# Patient Record
Sex: FEMALE | Race: WHITE | Employment: UNEMPLOYED | URBAN - METROPOLITAN AREA
[De-identification: names, ages, dates, MRNs, and addresses within clinical notes are randomized per-mention and may not be internally consistent; named-entity substitution may affect disease eponyms.]

---

## 2020-10-30 ENCOUNTER — OFFICE VISIT (OUTPATIENT)
Dept: PULMONOLOGY | Age: 60
End: 2020-10-30
Payer: MEDICARE

## 2020-10-30 VITALS
OXYGEN SATURATION: 98 % | DIASTOLIC BLOOD PRESSURE: 77 MMHG | HEART RATE: 98 BPM | RESPIRATION RATE: 18 BRPM | BODY MASS INDEX: 28.35 KG/M2 | SYSTOLIC BLOOD PRESSURE: 137 MMHG | WEIGHT: 160 LBS | HEIGHT: 63 IN

## 2020-10-30 PROCEDURE — G0296 VISIT TO DETERM LDCT ELIG: HCPCS | Performed by: INTERNAL MEDICINE

## 2020-10-30 PROCEDURE — 94664 DEMO&/EVAL PT USE INHALER: CPT | Performed by: INTERNAL MEDICINE

## 2020-10-30 PROCEDURE — 99204 OFFICE O/P NEW MOD 45 MIN: CPT | Performed by: INTERNAL MEDICINE

## 2020-10-30 RX ORDER — ALBUTEROL SULFATE 90 UG/1
2 AEROSOL, METERED RESPIRATORY (INHALATION) EVERY 6 HOURS PRN
COMMUNITY
End: 2021-01-21

## 2020-10-30 RX ORDER — ALBUTEROL SULFATE 2.5 MG/3ML
2.5 SOLUTION RESPIRATORY (INHALATION) EVERY 6 HOURS PRN
Qty: 120 EACH | Refills: 3 | Status: SHIPPED | OUTPATIENT
Start: 2020-10-30 | End: 2021-04-15 | Stop reason: SDUPTHER

## 2020-10-30 RX ORDER — TIOTROPIUM BROMIDE AND OLODATEROL 3.124; 2.736 UG/1; UG/1
2 SPRAY, METERED RESPIRATORY (INHALATION) DAILY
Qty: 3 INHALER | Refills: 3 | Status: SHIPPED | OUTPATIENT
Start: 2020-10-30 | End: 2020-12-22

## 2020-10-30 RX ORDER — AMITRIPTYLINE HYDROCHLORIDE 25 MG/1
25 TABLET, FILM COATED ORAL NIGHTLY
COMMUNITY

## 2020-10-30 RX ORDER — NICOTINE 21 MG/24HR
1 PATCH, TRANSDERMAL 24 HOURS TRANSDERMAL DAILY
Qty: 42 PATCH | Refills: 3 | Status: SHIPPED | OUTPATIENT
Start: 2020-10-30 | End: 2021-07-26

## 2020-10-30 RX ORDER — ALBUTEROL SULFATE 2.5 MG/3ML
2.5 SOLUTION RESPIRATORY (INHALATION) EVERY 6 HOURS PRN
COMMUNITY
End: 2020-10-30 | Stop reason: SDUPTHER

## 2020-10-30 RX ORDER — ESCITALOPRAM OXALATE 10 MG/1
10 TABLET ORAL DAILY
COMMUNITY

## 2020-10-30 NOTE — PROGRESS NOTES
PULMONARY OFFICE NEW PATIENT VISIT    CONSULTING PHYSICIAN:      REASON FOR VISIT:   Chief Complaint   Patient presents with    Shortness of Breath     NPV - pt states that she was dx'd w/COPD 2 yrs ago by her PCP       DATE OF VISIT: 10/30/2020    HISTORY OF PRESENT ILLNESS: 61y.o. year old female with past medical history of anxiety who is here for evaluation of shortness of breath.    -Patient has been complaining of dyspnea on exertion for at least 2 years now. She states that dyspnea on exertion is progressively getting worse. She get short of breath and performing daily activities of her life. No dyspnea on exertion at rest.  She denies any chronic cough. This is associated with off-and-on wheezing. Denies any chest pain or hemoptysis. Denies any allergic rhinitis symptoms or acid reflux. For his symptoms, she has been taking Spiriva Respimat 2.5 mcg 2 puffs daily and albuterol inhaler and albuterol nebs as needed. Patient states that for at least 6 months, she was out of her medications. Currently she does not have albuterol nebs. -Patient has significant smoking history of at least 43 pack years. She is not smoking up to 4 to 5 cigarettes/day. DIAGNOSTIC TEST REVIEWED:  No PFTs to review  No chest x-ray to review    REVIEW OF SYSTEMS:   CONSTITUTIONAL SYMPTOMS: The patient denies fever, fatigue, night sweats, weight loss or weight gain. HEENT: No vision changes. No tinnitus, Denies sinus pain. No hoarseness, or dysphagia. NECK: Patient denies swelling in the neck. CARDIOVASCULAR: Denies chest pain, palpitation, syncope. RESPIRATORY: See above. GASTROINTESTINAL: Denies nausea, abdominal pain or change in bowel function. GENITOURINARY: Denies obstructive symptoms. No history of incontinence. BREASTS: No masses or lumps in the breasts. SKIN: No rashes or itching. MUSCULOSKELETAL: Denies weakness or bone pain. NEUROLOGICAL: No headaches or seizures.    PSYCHIATRIC: Denies mood swings or depression. ENDOCRINE: Denies heat or cold intolerance or excessive thirst.  HEMATOLOGIC/LYMPHATIC: Denies easy bruising or lymph node swelling. ALLERGIC/IMMUNOLOGIC: No environmental allergies. PAST MEDICAL HISTORY: No past medical history on file. PAST SURGICAL HISTORY:   Past Surgical History:   Procedure Laterality Date    TUBAL LIGATION          SOCIAL HISTORY:   Social History     Tobacco Use    Smoking status: Current Every Day Smoker     Packs/day: 0.75     Years: 43.00     Pack years: 32.25     Types: Cigarettes    Smokeless tobacco: Never Used    Tobacco comment: started to smoke at 16 / smoked up to 0.75 ppd / now smoking 4 to 5 cigarettes a day   Substance Use Topics    Alcohol use: No    Drug use: No       FAMILY HISTORY:   Family History   Problem Relation Age of Onset    Stroke Mother     Cancer Father     Heart Disease Sister     Heart Disease Brother        MEDICATIONS:     Current Outpatient Medications on File Prior to Visit   Medication Sig Dispense Refill    albuterol sulfate HFA (VENTOLIN HFA) 108 (90 Base) MCG/ACT inhaler Inhale 2 puffs into the lungs every 6 hours as needed for Wheezing      tiotropium (SPIRIVA RESPIMAT) 2.5 MCG/ACT AERS inhaler Inhale 2 puffs into the lungs daily      amitriptyline (ELAVIL) 25 MG tablet Take 25 mg by mouth nightly      escitalopram (LEXAPRO) 10 MG tablet Take 10 mg by mouth daily      HYDROXYZINE HCL PO Take by mouth       No current facility-administered medications on file prior to visit. ALLERGIES:   Allergies as of 10/30/2020    (No Known Allergies)      OBJECTIVE:   height is 5' 3\" (1.6 m) and weight is 160 lb (72.6 kg). Her blood pressure is 137/77 and her pulse is 98. Her respiration is 18 and oxygen saturation is 98%. PHYSICAL EXAM:    CONSTITUTIONAL: She is a 61y.o.-year-old who appears her stated age. She is alert and oriented x 3 and in no acute distress. HEENT: VINH BLACKWELL.  No scleral icterus. No thrush, atraumatic, normocephalic. NECK: Supple, without cervical or supraclavicular lymphadenopathy:  CARDIOVASCULAR: S1 S2 RRR. Without murmer  RESPIRATORY & CHEST: Lungs are clear to auscultation and percussion. No wheezing, no crackles. Good air movement  GASTROINTESTINAL & ABDOMEN: Soft, nontender, positive bowel sounds in all quadrants, non-distended, without hepatosplenomegaly. GENITOURINARY: Deferred. MUSCULOSKELETAL: No tenderness to palpation of the axial skeleton. There is no clubbing. No cyanosis. No edema of the lower extremities. SKIN OF BODY: No rash or jaundice. PSYCHIATRIC EVALUATION: Normal affect. Patient answers questions appropriately. HEMATOLOGIC/LYMPHATIC/ IMMUNOLOGIC: No palpable lymphadenopathy. NEUROLOGIC: Alert and oriented x 3. Groslly non-focal. Motor strength is 5+/5 in all muscle groups. The patient has a normal sensorium globally. ASSESSMENT AND PLAN:     1. Centrilobular emphysema ([address]  With extensive smoking history, patient likely has COPD. I do not have any PFTs to review which I will order. Patient is not getting any benefit with Spiriva. I would change Spiriva to Stiolto Respimat. If her insurance does not cover the inhaler, then I will change it to something different. I personally went over inhaler technique with the patient in the clinic. She will also continue to use albuterol inhaler and albuterol nebs as needed. - Tiotropium Bromide-Olodaterol (STIOLTO RESPIMAT) 2.5-2.5 MCG/ACT AERS; Inhale 2 puffs into the lungs daily  Dispense: 3 Inhaler; Refill: 3  - Full PFT Study With Bronchodilator; Future      2. Current every day smoker  Patient has at least 43 pack years of smoking history. Currently smoking 4 to 5 cigarettes/day. Patient counselled on the dangers of tobacco use and urged to quit. Also discussed the importance of a supportive environment and helped identify them.  Discussed possibility of various Nicotine replacement therapies if experiencing prolonged craving or withdrawal symptoms. Discussed the possibility of negative mood or depression after quitting. Reassured that some weight gain after smoking is common and dietary, exercise, or lifestyle changes will be able to control it. Time spent counseling was >10mins. - Low Dose Chest CT-Abnormal Lung Screen Follow up; Future    - nicotine (NICODERM CQ) 14 MG/24HR; Place 1 patch onto the skin daily  Dispense: 42 patch; Refill: 3  - nicotine polacrilex (NICORETTE) 2 MG gum; Take 1 each by mouth every 6 hours as needed for Smoking cessation  Dispense: 110 each; Refill: 3  - CT Lung Screen (Initial or Annual); Future      Return in about 8 weeks (around 12/25/2020). Lizbeth Diaz MD  Pulmonary Critical Care and Sleep Medicine  Electronically signed by Lizbeth Diaz MD on 10/30/2020 at 10:06 AM     This note was completed using dragon medical speech recognition software. Grammatical errors, random word insertions, pronoun errors and incomplete sentences are occasional consequences of this technology due to software limitations. If there are questions or concerns about the content of this note of information contained within the body of this dictation they should be addressed with the provider for clarification. Low Dose CT (LDCT) Lung Screening criteria met   Age 50-69   Pack year smoking >30   Still smoking or less than 15 year since quit   No sign or symptoms of lung cancer   > 11 months since last LDCT     Risks and benefits of lung cancer screening with LDCT scans discussed:    Significance of positive screen - False-positive LDCT results often occur. 95% of all positive results do not lead to a diagnosis of cancer. Usually further imaging can resolve most false-positive results; however, some patients may require invasive procedures.     Over diagnosis risk - 10% to 12% of screen-detected lung cancer cases are over diagnosed--that is, the cancer would not have been detected in the patient's lifetime without the screening. Need for follow up screens annually to continue lung cancer screening effectiveness     Risks associated with radiation from annual LDCT- Radiation exposure is about the same as for a mammogram, which is about 1/3 of the annual background radiation exposure from everyday life. Starting screening at age 54 is not likely to increase cancer risk from radiation exposure. Patients with comorbidities resulting in life expectancy of < 10 years, or that would preclude treatment of an abnormality identified on CT, should not be screened due to lack of benefit.     To obtain maximal benefit from this screening, smoking cessation and long-term abstinence from smoking is critical

## 2020-11-16 ENCOUNTER — OFFICE VISIT (OUTPATIENT)
Dept: PRIMARY CARE CLINIC | Age: 60
End: 2020-11-16
Payer: MEDICARE

## 2020-11-16 PROCEDURE — 99211 OFF/OP EST MAY X REQ PHY/QHP: CPT | Performed by: NURSE PRACTITIONER

## 2020-11-16 NOTE — PROGRESS NOTES
Candelaria Melchor received a viral test for COVID-19. They were educated on isolation and quarantine as appropriate. For any symptoms, they were directed to seek care from their PCP, given contact information to establish with a doctor, directed to an urgent care or the emergency room.

## 2020-11-16 NOTE — PATIENT INSTRUCTIONS

## 2020-11-18 LAB — SARS-COV-2, NAA: NOT DETECTED

## 2020-11-27 ENCOUNTER — OFFICE VISIT (OUTPATIENT)
Dept: PRIMARY CARE CLINIC | Age: 60
End: 2020-11-27
Payer: MEDICARE

## 2020-11-27 PROCEDURE — 99211 OFF/OP EST MAY X REQ PHY/QHP: CPT | Performed by: NURSE PRACTITIONER

## 2020-11-27 PROCEDURE — G8419 CALC BMI OUT NRM PARAM NOF/U: HCPCS | Performed by: NURSE PRACTITIONER

## 2020-11-27 PROCEDURE — G8428 CUR MEDS NOT DOCUMENT: HCPCS | Performed by: NURSE PRACTITIONER

## 2020-11-27 NOTE — PATIENT INSTRUCTIONS

## 2020-11-27 NOTE — PROGRESS NOTES
Celeste Michael received a viral test for COVID-19. They were educated on isolation and quarantine as appropriate. For any symptoms, they were directed to seek care from their PCP, given contact information to establish with a doctor, directed to an urgent care or the emergency room.

## 2020-11-28 LAB — SARS-COV-2: NOT DETECTED

## 2020-12-14 ENCOUNTER — OFFICE VISIT (OUTPATIENT)
Dept: PRIMARY CARE CLINIC | Age: 60
End: 2020-12-14
Payer: MEDICARE

## 2020-12-14 PROCEDURE — G8428 CUR MEDS NOT DOCUMENT: HCPCS | Performed by: NURSE PRACTITIONER

## 2020-12-14 PROCEDURE — 99211 OFF/OP EST MAY X REQ PHY/QHP: CPT | Performed by: NURSE PRACTITIONER

## 2020-12-14 PROCEDURE — G8419 CALC BMI OUT NRM PARAM NOF/U: HCPCS | Performed by: NURSE PRACTITIONER

## 2020-12-14 NOTE — PROGRESS NOTES
Celestine Dawn received a viral test for COVID-19. They were educated on isolation and quarantine as appropriate. For any symptoms, they were directed to seek care from their PCP, given contact information to establish with a doctor, directed to an urgent care or the emergency room.

## 2020-12-14 NOTE — PATIENT INSTRUCTIONS
You have received a viral test for COVID-19. Below is education on quarantine per the CDC guidelines. For any symptoms, seek care from your PCP, call [phone] to establish care with a doctor, or go directly to an urgent care or the emergency room. Test results will take 2-7 days and will be sent to you in your [employer] account. If you test positive, you will be contacted via phone. If you test negative, the ONLY communication will be through [address] GO TO [employer] AND SIGN UP FOR [employer]  (LOWER LEFT OF THE HOME PAGE)  No test is 100%. If you have symptoms, you should follow the guidance of quarantine as previously stated. You can still be contagious if you have symptoms. Your [facility] will reach out to you if you have a positive result. They will provide you with a return to work date and note. If you were tested for a pre-op, then you should remain in quarantine until your procedure. How do I know if I need to be in quarantine? If you live in a community where COVID-19 is or might be spreading (currently, that is virtually everywhere in the [place])  Be alert for symptoms. Watch for fever, cough, shortness of breath, or other symptoms of COVID-19.  ? Take your temperature if symptoms develop. ? Practice social distancing. Maintain 6 feet of distance from others and stay out of crowded places. ? Follow CDC guidance if symptoms develop. If you feel healthy but:  ? Recently had close contact with a person with COVID-19 you need to Quarantine:  ? Stay home until 14 days after your last exposure. ? Check your temperature twice a day and watch for symptoms of COVID-19.  ? If possible, stay away from people who are at higher-risk for getting very sick from COVID-19. Stay Home and Monitor Your Health if you:  ? Have been diagnosed with COVID-19, or  ? Are waiting for test results, or  ?  Have cough, fever, or shortness of breath, or symptoms of COVID-19 When You Can be Around Others After You Had or Likely Had COVID-19     If you have or think you might have COVID-19, it is important to stay home and away from other people. Staying away from others helps stop the spread of COVID-19. If you have an emergency warning sign (including trouble breathing), get emergency medical care immediately. When you can be around others (end home isolation) depends on different factors for different situations. Find CDC's recommendations for your situation below. I think or know I had COVID-19, and I had symptoms  You can be with others after  ? 3 days with no fever and  ? Respiratory symptoms have improved (e.g. cough, shortness of breath) and  ? 10 days since symptoms first appeared  Depending on your healthcare provider's advice and availability of testing, you might get tested to see if you still have COVID-19. If you will be tested, you can be around others when you have no fever, respiratory symptoms have improved, and you receive two negative test results in a row, at least 24 hours apart. I tested positive for COVID-19 but had no symptoms  If you continue to have no symptoms, you can be with others after:  ? 10 days have passed since test or 14 days since your exposure test   Depending on your healthcare provider's advice and availability of testing, you might get tested to see if you still have COVID-19. If you will be tested, you can be around others after you receive two negative test results in a row, at least 24 hours apart. If you develop symptoms after testing positive, follow the guidance above for I think or know I had COVID, and I had symptoms.   For Anyone Who Has Been Around a Person with COVID-19  It is important to remember that anyone who has close contact with someone with COVID-19 should stay home for 14 days after exposure based on the time it takes to develop illness. Testing is not necessary.     www.cdc.gov/coronavirus/2019-ncov/index.html

## 2020-12-16 LAB — SARS-COV-2, NAA: NOT DETECTED

## 2020-12-18 ENCOUNTER — HOSPITAL ENCOUNTER (OUTPATIENT)
Dept: PULMONOLOGY | Age: 60
Discharge: HOME | End: 2020-12-18
Payer: MEDICARE

## 2020-12-18 ENCOUNTER — HOSPITAL ENCOUNTER (OUTPATIENT)
Dept: CT IMAGING | Age: 60
Discharge: HOME | End: 2020-12-18
Payer: MEDICARE

## 2020-12-18 VITALS — OXYGEN SATURATION: 93 % | HEART RATE: 84 BPM | RESPIRATION RATE: 20 BRPM

## 2020-12-18 LAB
DLCO %PRED: 46 %
DLCO PRED: NORMAL
DLCO/VA %PRED: NORMAL
DLCO/VA PRED: NORMAL
DLCO/VA: NORMAL
DLCO: NORMAL
EXPIRATORY TIME-POST: NORMAL
EXPIRATORY TIME: NORMAL
FEF 25-75% %CHNG: NORMAL
FEF 25-75% %PRED-POST: NORMAL
FEF 25-75% %PRED-PRE: NORMAL
FEF 25-75% PRED: NORMAL
FEF 25-75%-POST: NORMAL
FEF 25-75%-PRE: NORMAL
FEV1 %PRED-POST: 30 %
FEV1 %PRED-PRE: 22 %
FEV1 PRED: NORMAL
FEV1-POST: NORMAL
FEV1-PRE: NORMAL
FEV1/FVC %PRED-POST: NORMAL
FEV1/FVC %PRED-PRE: NORMAL
FEV1/FVC PRED: NORMAL
FEV1/FVC-POST: 33 %
FEV1/FVC-PRE: 25 %
FVC %PRED-POST: NORMAL
FVC %PRED-PRE: NORMAL
FVC PRED: NORMAL
FVC-POST: NORMAL
FVC-PRE: NORMAL
GAW %PRED: NORMAL
GAW PRED: NORMAL
GAW: NORMAL
IC %PRED: NORMAL
IC PRED: NORMAL
IC: NORMAL
MEP: NORMAL
MIP: NORMAL
MVV %PRED-PRE: NORMAL
MVV PRED: NORMAL
MVV-PRE: NORMAL
PEF %PRED-POST: NORMAL
PEF %PRED-PRE: NORMAL
PEF PRED: NORMAL
PEF%CHNG: NORMAL
PEF-POST: NORMAL
PEF-PRE: NORMAL
RAW %PRED: NORMAL
RAW PRED: NORMAL
RAW: NORMAL
RV %PRED: NORMAL
RV PRED: NORMAL
RV: NORMAL
SVC %PRED: NORMAL
SVC PRED: NORMAL
SVC: NORMAL
TLC %PRED: 317 %
TLC PRED: NORMAL
TLC: NORMAL
VA %PRED: NORMAL
VA PRED: NORMAL
VA: NORMAL
VTG %PRED: NORMAL
VTG PRED: NORMAL
VTG: NORMAL

## 2020-12-18 PROCEDURE — 71250 CT THORAX DX C-: CPT

## 2020-12-18 PROCEDURE — 94760 N-INVAS EAR/PLS OXIMETRY 1: CPT

## 2020-12-18 PROCEDURE — 94200 LUNG FUNCTION TEST (MBC/MVV): CPT

## 2020-12-18 PROCEDURE — 94729 DIFFUSING CAPACITY: CPT

## 2020-12-18 PROCEDURE — 94060 EVALUATION OF WHEEZING: CPT

## 2020-12-18 PROCEDURE — 94726 PLETHYSMOGRAPHY LUNG VOLUMES: CPT

## 2020-12-18 PROCEDURE — 6370000000 HC RX 637 (ALT 250 FOR IP): Performed by: INTERNAL MEDICINE

## 2020-12-18 RX ORDER — ALBUTEROL SULFATE 90 UG/1
4 AEROSOL, METERED RESPIRATORY (INHALATION) ONCE
Status: COMPLETED | OUTPATIENT
Start: 2020-12-18 | End: 2020-12-18

## 2020-12-18 RX ADMIN — Medication 4 PUFF: at 12:01

## 2020-12-18 ASSESSMENT — PULMONARY FUNCTION TESTS
FEV1_PERCENT_PREDICTED_PRE: 22
FEV1_PERCENT_PREDICTED_POST: 30
FEV1/FVC_PRE: 25
FEV1/FVC_POST: 33

## 2020-12-22 ENCOUNTER — TELEPHONE (OUTPATIENT)
Dept: PULMONOLOGY | Age: 60
End: 2020-12-22

## 2020-12-22 RX ORDER — BUDESONIDE AND FORMOTEROL FUMARATE DIHYDRATE 160; 4.5 UG/1; UG/1
2 AEROSOL RESPIRATORY (INHALATION) 2 TIMES DAILY
Qty: 1 INHALER | Refills: 3 | Status: SHIPPED | OUTPATIENT
Start: 2020-12-22 | End: 2021-04-22 | Stop reason: SDUPTHER

## 2020-12-22 NOTE — TELEPHONE ENCOUNTER
She has severe COPD. I would like to have her on ICS/LABA + LAMA inhalers. How about trelegy ? Is that covered ? If not then we could do combination of synbicort 160/4.5 with incruse elipta (if it is covered).

## 2020-12-22 NOTE — PROCEDURES
Pulmonary Function Testing      Patient name:  Xiao Shearer     [facility] Unit #:   [id number]   Date of test:  12/18/2020  Date of interpretation:   12/22/2020    Ms. Xiao Shearer is a 61y.o. year-old current smoker. The spirometry data were acceptable and reproducible. Spirometry:  Flow volume loops were obstructed. The FEV-1/FVC ratio was decreased. The FEV-1 was 0.53 liters (22% of predicted), which was severely decreased. The FVC was 1.79 liters (56% of predicted), which was decreased. Response to inhaled bronchodilators (albuterol) was not significant. Lung volumes:  Lung volumes were tested by plethysmography. The total lung capacity was 14.86 liters (317% of predicted), which was increased. The residual volume was 13.06 liters (741% of predicted), which was increased. The ratio of residual volume to total lung capacity (RV/TLC) was 88, which was increased. Diffusion capacity was found to be 46% which is Moderately decreased. Interpretation:  Very severe obstruction noted with severe hyperinflation and no significant bronchodilator response based on 200ml criteria.     Comments:

## 2021-01-21 ENCOUNTER — VIRTUAL VISIT (OUTPATIENT)
Dept: PULMONOLOGY | Age: 61
End: 2021-01-21
Payer: MEDICARE

## 2021-01-21 DIAGNOSIS — F17.200 CURRENT EVERY DAY SMOKER: ICD-10-CM

## 2021-01-21 DIAGNOSIS — J44.9 COPD, SEVERE (HCC): Primary | ICD-10-CM

## 2021-01-21 DIAGNOSIS — R91.1 NODULE OF LOWER LOBE OF LEFT LUNG: ICD-10-CM

## 2021-01-21 PROCEDURE — G8419 CALC BMI OUT NRM PARAM NOF/U: HCPCS | Performed by: INTERNAL MEDICINE

## 2021-01-21 PROCEDURE — 3017F COLORECTAL CA SCREEN DOC REV: CPT | Performed by: INTERNAL MEDICINE

## 2021-01-21 PROCEDURE — G8926 SPIRO NO PERF OR DOC: HCPCS | Performed by: INTERNAL MEDICINE

## 2021-01-21 PROCEDURE — 99214 OFFICE O/P EST MOD 30 MIN: CPT | Performed by: INTERNAL MEDICINE

## 2021-01-21 PROCEDURE — G8484 FLU IMMUNIZE NO ADMIN: HCPCS | Performed by: INTERNAL MEDICINE

## 2021-01-21 PROCEDURE — G8427 DOCREV CUR MEDS BY ELIG CLIN: HCPCS | Performed by: INTERNAL MEDICINE

## 2021-01-21 PROCEDURE — 3023F SPIROM DOC REV: CPT | Performed by: INTERNAL MEDICINE

## 2021-01-21 PROCEDURE — 4004F PT TOBACCO SCREEN RCVD TLK: CPT | Performed by: INTERNAL MEDICINE

## 2021-01-21 RX ORDER — ALBUTEROL SULFATE 90 UG/1
2 AEROSOL, METERED RESPIRATORY (INHALATION) EVERY 6 HOURS PRN
Qty: 1 INHALER | Refills: 3 | Status: SHIPPED | OUTPATIENT
Start: 2021-01-21

## 2021-01-21 RX ORDER — ALBUTEROL SULFATE 90 UG/1
2 AEROSOL, METERED RESPIRATORY (INHALATION) EVERY 6 HOURS PRN
COMMUNITY
End: 2021-07-26

## 2021-01-21 NOTE — PROGRESS NOTES
PULMONARY OFFICE FOLLOW UP NOTE  Pulmonology Video Visit    Pursuant to the emergency declaration under the [facility] waiver authority and the Coronavirus Preparedness and Response Supplemental Appropriations Act this Video Visit was insisted, with patient's consent, to reduce the patient's risk of exposure to COVID-19 and provide continuity of care for an established patient. The patient was at home, while the provider was at the clinic. Services were provided through a synchronous discussion through a Video Visit to substitute for in-person clinic visit, and coded as such. REASON FOR VISIT:   Chief Complaint   Patient presents with    COPD    Results     PFT done 12/18/2020       DATE OF VISIT: 1/21/2021    HISTORY OF PRESENT ILLNESS: 61y.o. year old female is here for follow-up of COPD, FEV1 25%. She has past medical history of anxiety. Patient's bronchodilator regimen consist of Symbicort 160/4.5 mcg 2 puffs twice daily and Spiriva Respimat 2.5 mcg daily. She also takes albuterol inhaler and albuterol nebs as needed. Patient had noted improvement in shortness of breath with the above said regimen but for the last couple of days, she has been having bad cold with sinus congestion and increased cough, mostly dry with worsening shortness of breath and wheezing. She denies any fever or nausea vomiting or diarrhea. She denies loss of sense of smell or taste. Patient has significant smoking history of at least 43 pack years. She is now smoking up to 3-4 cigarettes/day. She is also using nicotine patch and gum to help her quit smoking.     DIAGNOSTIC TEST REVIEWED:  I reviewed the PFT from 12/18/2020 and my interpretation is as follows. Severe obstructive airway disease with hyperinflation and reduced diffusion capacity.   FEV1/FVC 33  FEV1 25%, 0.62 L  FVC 59%, 1.8 L  %, 14.86 L  DLCO 46% I reviewed that low-dose CT chest performed on 12/18/2020 and my interpretation is as follows. 3 mm solid left lower lobe nodule. REVIEW OF SYSTEMS:   CONSTITUTIONAL SYMPTOMS: The patient denies fever, fatigue, night sweats, weight loss or weight gain. HEENT: No vision changes. No tinnitus, Denies sinus pain. No hoarseness, or dysphagia. NECK: Patient denies swelling in the neck. CARDIOVASCULAR: Denies chest pain, palpitation, syncope. RESPIRATORY: See above  GASTROINTESTINAL: Denies nausea, abdominal pain or change in bowel function. GENITOURINARY: Denies obstructive symptoms. No history of incontinence. BREASTS: No masses or lumps in the breasts. SKIN: No rashes or itching. MUSCULOSKELETAL: Denies weakness or bone pain. NEUROLOGICAL: No headaches or seizures. PSYCHIATRIC: Denies mood swings or depression. ENDOCRINE: Denies heat or cold intolerance or excessive thirst.  HEMATOLOGIC/LYMPHATIC: Denies easy bruising or lymph node swelling. ALLERGIC/IMMUNOLOGIC: No environmental allergies. PAST MEDICAL HISTORY: History reviewed. No pertinent past medical history.     PAST SURGICAL HISTORY:   Past Surgical History:   Procedure Laterality Date    TUBAL LIGATION         SOCIAL HISTORY:   Social History     Tobacco Use    Smoking status: Current Every Day Smoker     Packs/day: 0.75     Years: 43.00     Pack years: 32.25     Types: Cigarettes    Smokeless tobacco: Never Used    Tobacco comment: started to smoke at 16 / smoked up to 0.75 ppd / now smoking 4 to 5 cigarettes a day   Substance Use Topics    Alcohol use: No    Drug use: No       FAMILY HISTORY:   Family History   Problem Relation Age of Onset    Stroke Mother     Cancer Father     Heart Disease Sister     Heart Disease Brother        MEDICATIONS:     Current Outpatient Medications on File Prior to Visit   Medication Sig Dispense Refill  albuterol sulfate HFA (VENTOLIN HFA) 108 (90 Base) MCG/ACT inhaler Inhale 2 puffs into the lungs every 6 hours as needed for Wheezing      SYMBICORT 160-4.5 MCG/ACT AERO Inhale 2 puffs into the lungs 2 times daily 1 Inhaler 3    tiotropium (SPIRIVA RESPIMAT) 2.5 MCG/ACT AERS inhaler Inhale 2 puffs into the lungs daily      amitriptyline (ELAVIL) 25 MG tablet Take 25 mg by mouth nightly      escitalopram (LEXAPRO) 10 MG tablet Take 10 mg by mouth daily      HYDROXYZINE HCL PO Take by mouth      nicotine polacrilex (NICORETTE) 2 MG gum Take 1 each by mouth every 6 hours as needed for Smoking cessation 110 each 3    albuterol (PROVENTIL) (2.5 MG/3ML) 0.083% nebulizer solution Take 3 mLs by nebulization every 6 hours as needed for Wheezing Dx: COPD   ICD-10: J44.9 120 each 3    nicotine (NICODERM CQ) 14 MG/24HR Place 1 patch onto the skin daily 42 patch 3     No current facility-administered medications on file prior to visit. ALLERGIES:   Allergies as of 01/21/2021    (No Known Allergies)      OBJECTIVE:   vitals were not taken for this visit. PHYSICAL EXAM:  Physical Exam    Constitutional:       General: She is not in acute distress. Appearance: Normal appearance. She is not ill-appearing. HENT:      Head: Normocephalic and atraumatic. Right Ear: External ear normal.      Left Ear: External ear normal.   Eyes:      General: No scleral icterus. Right eye: No discharge. Left eye: No discharge. Extraocular Movements: Extraocular movements intact. Conjunctiva/sclera: Conjunctivae normal.   Neck:      Musculoskeletal: Neck supple. Pulmonary:      Effort: Pulmonary effort is normal. No respiratory distress. Skin:     Coloration: Skin is not jaundiced. Findings: No lesion or rash. Neurological:      Mental Status: She is alert. Cranial Nerves: No cranial nerve deficit.       Coordination: Coordination normal.   Psychiatric:

## 2021-01-22 ENCOUNTER — TELEPHONE (OUTPATIENT)
Dept: PULMONOLOGY | Age: 61
End: 2021-01-22

## 2021-01-22 DIAGNOSIS — J44.1 CHRONIC OBSTRUCTIVE PULMONARY DISEASE WITH ACUTE EXACERBATION (HCC): ICD-10-CM

## 2021-01-22 DIAGNOSIS — J44.9 COPD, SEVERE (HCC): Primary | ICD-10-CM

## 2021-01-22 RX ORDER — PREDNISONE 10 MG/1
TABLET ORAL
Qty: 20 TABLET | Refills: 0 | Status: SHIPPED | OUTPATIENT
Start: 2021-01-22 | End: 2021-04-22 | Stop reason: ALTCHOICE

## 2021-01-22 NOTE — TELEPHONE ENCOUNTER
I just sent a script of prednisone to the pharmacy. I have also ordered COVID 19 PCR test. Please let the patient know. Thanks.

## 2021-04-12 RX ORDER — BUDESONIDE AND FORMOTEROL FUMARATE DIHYDRATE 160; 4.5 UG/1; UG/1
2 AEROSOL RESPIRATORY (INHALATION) 2 TIMES DAILY
Qty: 1 INHALER | Refills: 0 | Status: SHIPPED | OUTPATIENT
Start: 2021-04-12 | End: 2021-10-11 | Stop reason: SDUPTHER

## 2021-04-15 RX ORDER — ALBUTEROL SULFATE 2.5 MG/3ML
2.5 SOLUTION RESPIRATORY (INHALATION) EVERY 6 HOURS PRN
Qty: 120 EACH | Refills: 0 | Status: SHIPPED | OUTPATIENT
Start: 2021-04-15

## 2021-04-16 NOTE — TELEPHONE ENCOUNTER
Called pt to let her know Dr David Monge wanted her back this month for a f.u.  Appt made for Monday and pt can disuss parking placard with Dr quintero

## 2021-04-22 ENCOUNTER — OFFICE VISIT (OUTPATIENT)
Dept: PULMONOLOGY | Age: 61
End: 2021-04-22
Payer: MEDICARE

## 2021-04-22 VITALS
DIASTOLIC BLOOD PRESSURE: 79 MMHG | TEMPERATURE: 96.6 F | SYSTOLIC BLOOD PRESSURE: 131 MMHG | OXYGEN SATURATION: 92 % | HEART RATE: 92 BPM

## 2021-04-22 DIAGNOSIS — J44.9 COPD, SEVERE (HCC): Primary | ICD-10-CM

## 2021-04-22 DIAGNOSIS — R91.1 PULMONARY NODULE, LEFT: ICD-10-CM

## 2021-04-22 DIAGNOSIS — F17.200 CURRENT SMOKER ON SOME DAYS: ICD-10-CM

## 2021-04-22 PROCEDURE — 4004F PT TOBACCO SCREEN RCVD TLK: CPT | Performed by: INTERNAL MEDICINE

## 2021-04-22 PROCEDURE — 3023F SPIROM DOC REV: CPT | Performed by: INTERNAL MEDICINE

## 2021-04-22 PROCEDURE — 99214 OFFICE O/P EST MOD 30 MIN: CPT | Performed by: INTERNAL MEDICINE

## 2021-04-22 PROCEDURE — G8926 SPIRO NO PERF OR DOC: HCPCS | Performed by: INTERNAL MEDICINE

## 2021-04-22 PROCEDURE — G8419 CALC BMI OUT NRM PARAM NOF/U: HCPCS | Performed by: INTERNAL MEDICINE

## 2021-04-22 PROCEDURE — 3017F COLORECTAL CA SCREEN DOC REV: CPT | Performed by: INTERNAL MEDICINE

## 2021-04-22 PROCEDURE — G8427 DOCREV CUR MEDS BY ELIG CLIN: HCPCS | Performed by: INTERNAL MEDICINE

## 2021-04-22 RX ORDER — BUDESONIDE AND FORMOTEROL FUMARATE DIHYDRATE 160; 4.5 UG/1; UG/1
2 AEROSOL RESPIRATORY (INHALATION) 2 TIMES DAILY
Qty: 1 INHALER | Refills: 3 | Status: SHIPPED | OUTPATIENT
Start: 2021-04-22 | End: 2021-07-26

## 2021-04-22 NOTE — PROGRESS NOTES
PULMONARY OFFICE FOLLOW UP NOTE    REASON FOR VISIT:   Chief Complaint   Patient presents with    Shortness of Breath       DATE OF VISIT: 4/22/2021    HISTORY OF PRESENT ILLNESS: 61y.o. year old female is here for follow-up of COPD, FEV1 25%. She has past medical history of anxiety. Patient's bronchodilator regimen consist of Symbicort 160/4.5 mcg 2 puffs twice daily along with Spiriva Respimat 2.5 mcg 2 puffs daily. She also takes albuterol inhaler and albuterol nebs as needed. Patient dyspnea on exertion is stable. No increased cough or mucus production. No wheezing or chest pain. She still struggles to perform daily activities of her life because of shortness of breath. Patient has significant smoking history of at least 43 pack years. Freida Fleischer is now smoking couple of cigarettes here and there. She is not smoking daily. .  She is also using nicotine patch and gum to help her quit smoking.     DIAGNOSTIC TEST REVIEWED:  I reviewed the PFT from 12/18/2020 and my interpretation is as follows. Severe obstructive airway disease with hyperinflation and reduced diffusion capacity. FEV1/FVC 33  FEV1 25%, 0.62 L  FVC 59%, 1.8 L  %, 14.86 L  DLCO 46%     I reviewed that low-dose CT chest performed on 12/18/2020 and my interpretation is as follows. 3 mm solid left lower lobe nodule. REVIEW OF SYSTEMS:   CONSTITUTIONAL SYMPTOMS: The patient denies fever, fatigue, night sweats, weight loss or weight gain. HEENT: No vision changes. No tinnitus, Denies sinus pain. No hoarseness, or dysphagia. NECK: Patient denies swelling in the neck. CARDIOVASCULAR: Denies chest pain, palpitation, syncope. RESPIRATORY: See above. GASTROINTESTINAL: Denies nausea, abdominal pain or change in bowel function. GENITOURINARY: Denies obstructive symptoms. No history of incontinence. BREASTS: No masses or lumps in the breasts. SKIN: No rashes or itching. MUSCULOSKELETAL: Denies weakness or bone pain. she is smoking couple cigarettes here and there. She is not smoking every day. I have advised the patient to quit smoking completely. She will continue annual low-dose CT chest for lung cancer screening. 3. Pulmonary nodule, left  Low-dose CT chest from 12/18/2020 showed 3 mm solid left lower lobe nodule. Next CT screening would be on 12/18/2021. Return in about 3 months (around 7/22/2021). Jose A Henry MD  Pulmonary Critical Care and Sleep Medicine  Electronically signed by Jose A Henry MD on 4/22/2021 at 10:49 AM     This note was completed using dragon medical speech recognition software. Grammatical errors, random word insertions, pronoun errors and incomplete sentences are occasional consequences of this technology due to software limitations. If there are questions or concerns about the content of this note of information contained within the body of this dictation they should be addressed with the provider for clarification.

## 2021-07-26 ENCOUNTER — OFFICE VISIT (OUTPATIENT)
Dept: PULMONOLOGY | Age: 61
End: 2021-07-26
Payer: MEDICARE

## 2021-07-26 VITALS
DIASTOLIC BLOOD PRESSURE: 84 MMHG | HEART RATE: 100 BPM | SYSTOLIC BLOOD PRESSURE: 126 MMHG | HEIGHT: 63 IN | OXYGEN SATURATION: 92 % | BODY MASS INDEX: 27.61 KG/M2 | WEIGHT: 155.8 LBS

## 2021-07-26 DIAGNOSIS — J44.9 COPD, SEVERE (HCC): Primary | ICD-10-CM

## 2021-07-26 PROCEDURE — G8419 CALC BMI OUT NRM PARAM NOF/U: HCPCS | Performed by: INTERNAL MEDICINE

## 2021-07-26 PROCEDURE — G8427 DOCREV CUR MEDS BY ELIG CLIN: HCPCS | Performed by: INTERNAL MEDICINE

## 2021-07-26 PROCEDURE — 4004F PT TOBACCO SCREEN RCVD TLK: CPT | Performed by: INTERNAL MEDICINE

## 2021-07-26 PROCEDURE — G8926 SPIRO NO PERF OR DOC: HCPCS | Performed by: INTERNAL MEDICINE

## 2021-07-26 PROCEDURE — 3023F SPIROM DOC REV: CPT | Performed by: INTERNAL MEDICINE

## 2021-07-26 PROCEDURE — 99214 OFFICE O/P EST MOD 30 MIN: CPT | Performed by: INTERNAL MEDICINE

## 2021-07-26 PROCEDURE — 3017F COLORECTAL CA SCREEN DOC REV: CPT | Performed by: INTERNAL MEDICINE

## 2021-07-26 NOTE — PROGRESS NOTES
PULMONARY OFFICE FOLLOW UP NOTE    REASON FOR VISIT:   Chief Complaint   Patient presents with    COPD     3 MO f/u       DATE OF VISIT: 7/26/2021    HISTORY OF PRESENT ILLNESS: 64y.o. year old female is here for follow-up of COPD, FEV1 25%. She has past medical history of anxiety.     Patient is complaints of worsening exertional dyspnea and exertion. She denies any ER visits/hospitalization for COPD exacerbation. Continues to use Symbicort 160/4.5 mcg 2 puffs twice daily along with Spiriva Respimat 2.5 mcg 2 puffs daily. She also takes albuterol inhaler as needed. Uses albuterol nebs twice or thrice a day. She has not heard from pulmonary rehabilitation. No increased cough or mucus production. No wheezing or chest pain. She still struggles to perform daily activities of her life because of shortness of breath.     Patient has significant smoking history of at least 43 pack years. Mace  is now smoking couple of cigarettes here and there. She is not smoking daily. She is also using nicotine patch and gum to help her quit smoking.     DIAGNOSTIC TEST REVIEWED:  I reviewed the PFT from 12/18/2020 and my interpretation is as follows.  Severe obstructive airway disease with hyperinflation and reduced diffusion capacity. FEV1/FVC 33  FEV1 25%, 0.62 L  FVC 59%, 1.8 L  %, 14.86 L  DLCO 46%     I reviewed that low-dose CT chest performed on 12/18/2020 and my interpretation is as follows.  3 mm solid left lower lobe nodule.      REVIEW OF SYSTEMS:    CONSTITUTIONAL SYMPTOMS: The patient denies fever, fatigue, night sweats, weight loss or weight gain. HEENT: No vision changes. No tinnitus, Denies sinus pain. No hoarseness, or dysphagia. NECK: Patient denies swelling in the neck. CARDIOVASCULAR: Denies chest pain, palpitation, syncope. RESPIRATORY: See above  GASTROINTESTINAL: Denies nausea, abdominal pain or change in bowel function. GENITOURINARY: Denies obstructive symptoms.  No history of incontinence. BREASTS: No masses or lumps in the breasts. SKIN: No rashes or itching. MUSCULOSKELETAL: Denies weakness or bone pain. NEUROLOGICAL: No headaches or seizures. PSYCHIATRIC: Denies mood swings or depression. ENDOCRINE: Denies heat or cold intolerance or excessive thirst.  HEMATOLOGIC/LYMPHATIC: Denies easy bruising or lymph node swelling. ALLERGIC/IMMUNOLOGIC: No environmental allergies. PAST MEDICAL HISTORY: History reviewed. No pertinent past medical history.     PAST SURGICAL HISTORY:   Past Surgical History:   Procedure Laterality Date    TUBAL LIGATION         SOCIAL HISTORY:   Social History     Tobacco Use    Smoking status: Current Some Day Smoker     Packs/day: 0.75     Years: 43.00     Pack years: 32.25     Types: Cigarettes    Smokeless tobacco: Never Used    Tobacco comment: started to smoke at 16 / smoked up to 0.75 ppd / now smoking 4 to 5 cigarettes a day   Vaping Use    Vaping Use: Never used   Substance Use Topics    Alcohol use: No    Drug use: No       FAMILY HISTORY:   Family History   Problem Relation Age of Onset    Stroke Mother     Cancer Father     Heart Disease Sister     Heart Disease Brother        MEDICATIONS:     Current Outpatient Medications on File Prior to Visit   Medication Sig Dispense Refill    tiotropium (SPIRIVA RESPIMAT) 2.5 MCG/ACT AERS inhaler Inhale 2 puffs into the lungs daily 4 g 3    albuterol (PROVENTIL) (2.5 MG/3ML) 0.083% nebulizer solution Take 3 mLs by nebulization every 6 hours as needed for Wheezing Dx: COPD   ICD-10: J44.9 120 each 0    budesonide-formoterol (SYMBICORT) 160-4.5 MCG/ACT AERO Inhale 2 puffs into the lungs 2 times daily 1 Inhaler 0    albuterol sulfate HFA (VENTOLIN HFA) 108 (90 Base) MCG/ACT inhaler Inhale 2 puffs into the lungs every 6 hours as needed for Wheezing 1 Inhaler 3    amitriptyline (ELAVIL) 25 MG tablet Take 25 mg by mouth nightly      escitalopram (LEXAPRO) 10 MG tablet Take 10 mg by mouth daily      HYDROXYZINE HCL PO Take by mouth       No current facility-administered medications on file prior to visit. ALLERGIES:   Allergies as of 07/26/2021    (No Known Allergies)      OBJECTIVE:   height is 5' 3\" (1.6 m) and weight is 155 lb 12.8 oz (70.7 kg). Her blood pressure is 126/84 and her pulse is 100. Her oxygen saturation is 92%. PHYSICAL EXAM:    CONSTITUTIONAL: She is a 64y.o.-year-old who appears her stated age. She is alert and oriented x 3 and in no acute distress. HEENT: PERRL. No scleral icterus. No thrush, atraumatic, normocephalic. NECK: Supple, without cervical or supraclavicular lymphadenopathy:  CARDIOVASCULAR: S1 S2 RRR. Without murmer  RESPIRATORY & CHEST: Lungs are clear to auscultation and percussion. No wheezing, no crackles. Good air movement  GASTROINTESTINAL & ABDOMEN: Soft, nontender, positive bowel sounds in all quadrants, non-distended, without hepatosplenomegaly. GENITOURINARY: Deferred. MUSCULOSKELETAL: No tenderness to palpation of the axial skeleton. There is no clubbing. No cyanosis. No edema of the lower extremities. SKIN OF BODY: No rash or jaundice. PSYCHIATRIC EVALUATION: Normal affect. Patient answers questions appropriately. HEMATOLOGIC/LYMPHATIC/ IMMUNOLOGIC: No palpable lymphadenopathy. NEUROLOGIC: Alert and oriented x 3. Groslly non-focal. Motor strength is 5+/5 in all muscle groups. The patient has a normal sensorium globally. ASSESSMENT AND PLAN:     1. COPD, severe ([address] FEV1 25%  Continue Symbicort 160/4.5 mcg 2 puffs twice daily  Continue Spiriva Respimat 2.5 mcg 2 puffs daily  Continue albuterol inhaler and albuterol nebs as needed. I will refer her to pulmonary rehabilitation.  - Handicap placard  - Ambulatory referral to Pulmonary Rehab    2. Current smoker on some days  Patient has at least 43 pack years of smoking history. Currently she is smoking couple cigarettes here and there.  She is not smoking every day.  I have advised the patient to quit smoking completely. Yessenia Scott will continue annual low-dose CT chest for lung cancer screening.     3. Pulmonary nodule, left  Low-dose CT chest from 12/18/2020 showed 3 mm solid left lower lobe nodule.  Next CT screening would be on 12/18/2021.       Return in about 3 months (around 10/26/2021). Elma Mesa MD  Pulmonary Critical Care and Sleep Medicine  Electronically signed by Elma Mesa MD on 7/26/2021 at 10:13 AM     This note was completed using dragon medical speech recognition software. Grammatical errors, random word insertions, pronoun errors and incomplete sentences are occasional consequences of this technology due to software limitations. If there are questions or concerns about the content of this note of information contained within the body of this dictation they should be addressed with the provider for clarification.

## 2021-07-27 ENCOUNTER — TELEPHONE (OUTPATIENT)
Dept: PULMONOLOGY | Age: 61
End: 2021-07-27

## 2021-08-06 RX ORDER — TIOTROPIUM BROMIDE INHALATION SPRAY 3.12 UG/1
SPRAY, METERED RESPIRATORY (INHALATION)
Qty: 4 G | Refills: 3 | Status: SHIPPED | OUTPATIENT
Start: 2021-08-06 | End: 2021-11-29

## 2021-08-17 ENCOUNTER — HOSPITAL ENCOUNTER (OUTPATIENT)
Dept: CARDIAC REHAB | Age: 61
Setting detail: THERAPIES SERIES
Discharge: HOME | End: 2021-08-17
Payer: MEDICARE

## 2021-08-17 VITALS
WEIGHT: 156 LBS | DIASTOLIC BLOOD PRESSURE: 82 MMHG | HEIGHT: 63 IN | OXYGEN SATURATION: 94 % | BODY MASS INDEX: 27.64 KG/M2 | RESPIRATION RATE: 18 BRPM | SYSTOLIC BLOOD PRESSURE: 146 MMHG | HEART RATE: 95 BPM

## 2021-08-17 ASSESSMENT — PATIENT HEALTH QUESTIONNAIRE - PHQ9
SUM OF ALL RESPONSES TO PHQ QUESTIONS 1-9: 7
SUM OF ALL RESPONSES TO PHQ QUESTIONS 1-9: 7

## 2021-08-17 ASSESSMENT — COPD QUESTIONNAIRES
CAT_TOTALSCORE: 26
QUESTION1_COUGHFREQUENCY: 1
QUESTION7_SLEEPQUALITY: 2
QUESTION8_ENERGYLEVEL: 5
QUESTION3_CHESTTIGHTNESS: 3
QUESTION4_WALKINCLINE: 5
QUESTION2_CHESTPHLEGM: 2
QUESTION6_LEAVINGHOUSE: 4
QUESTION5_HOMEACTIVITIES: 4

## 2021-08-17 NOTE — PROGRESS NOTES
[facility] Initial Evaluation    Katrina Stinson       1960     [id number]    Share medical information:  Yes - Valerie Nix (friend): [phone]    Pulmonary History    COPD    Physical Assessment     General Appearance   Height:  5' 3\" (160 cm)  Weight:  156 lb (70.8 kg) (156.0 lb)   BMI:  27.7  Skin color:  Exceptions to WDL Skin Color: Appropriate for ethnicity    Cardiovascular Assessment  BP Sitting:  (!) 146/82 (Right arm sitting)  Sittin/86 (left arm sitting)  Standin/80 (right arm)  Heart rate:   95 Monitor   Heart sounds:   Regular S1, S2, No adventitious heart sounds    Respiratory Assessment  Resp rate: 18     Regular  Normal  L Breath Sounds: End Expiratory Wheezes, Diminished   R Breath Sounds: End Expiratory Wheezes, Diminished  SpO2:  94 %  Quality/Effort:   Dyspnea with exertion  Sleep Apnea:  No  CPAP  No  Oxygen  No     Sleeping Habits:  Pt. States that she sleeps ok but that things on her mind keep her from sleeping well. Cough:  No  Wheezing:  Yes - Pt. States that has increased wheezing when humid outside. Chest discomfort:  No    Edema:  No      Orthopedic/Exercise Limitations:  No    Pain:   Do you have pain?:  no       Fall Risk Assessment     History of falling with or without injury: No  Use of ambulatory aid: No  Difficulty walking/impaired gait: No  Numbness in feet: No  Vision changes: No  Dizziness: No  Shortness of breath: Yes     Other fall risk : No  Outpatient fall risk intervention strategies: Fall risk education provided    Abuse / Neglect  Physical/behavioral signs of abuse/neglect   No    Do you feel safe at home   Yes    Advanced Directives  Patient has Advanced Directives:  No  Patient given Advanced Directive pack:  Declined    Vaccinations  Influenza (annual): No  Pneumonia:  No     Pt. Arrived to Cardiopulmonary rehab for her initial interview and evaluation for Pulmonary rehab.    PT's insurance was

## 2021-08-30 ENCOUNTER — HOSPITAL ENCOUNTER (OUTPATIENT)
Dept: CARDIAC REHAB | Age: 61
Setting detail: THERAPIES SERIES
Discharge: HOME | End: 2021-08-30
Payer: MEDICARE

## 2021-09-03 ENCOUNTER — HOSPITAL ENCOUNTER (OUTPATIENT)
Dept: CARDIAC REHAB | Age: 61
Setting detail: THERAPIES SERIES
Discharge: HOME | End: 2021-09-03
Payer: MEDICARE

## 2021-10-11 RX ORDER — BUDESONIDE AND FORMOTEROL FUMARATE DIHYDRATE 160; 4.5 UG/1; UG/1
2 AEROSOL RESPIRATORY (INHALATION) 2 TIMES DAILY
Qty: 1 EACH | Refills: 5 | Status: SHIPPED | OUTPATIENT
Start: 2021-10-11 | End: 2022-09-27 | Stop reason: SDUPTHER

## 2021-12-27 RX ORDER — TIOTROPIUM BROMIDE INHALATION SPRAY 3.12 UG/1
SPRAY, METERED RESPIRATORY (INHALATION)
Qty: 4 G | Refills: 0 | Status: SHIPPED | OUTPATIENT
Start: 2021-12-27 | End: 2022-01-31

## 2022-01-05 ENCOUNTER — OFFICE VISIT (OUTPATIENT)
Dept: PULMONOLOGY | Age: 62
End: 2022-01-05
Payer: MEDICARE

## 2022-01-05 VITALS — OXYGEN SATURATION: 95 % | HEART RATE: 79 BPM

## 2022-01-05 DIAGNOSIS — R06.02 SOB (SHORTNESS OF BREATH): ICD-10-CM

## 2022-01-05 DIAGNOSIS — J43.2 CENTRILOBULAR EMPHYSEMA (HCC): ICD-10-CM

## 2022-01-05 DIAGNOSIS — R91.1 PULMONARY NODULE: ICD-10-CM

## 2022-01-05 DIAGNOSIS — J96.11 CHRONIC HYPOXEMIC RESPIRATORY FAILURE (HCC): ICD-10-CM

## 2022-01-05 DIAGNOSIS — J44.9 COPD, SEVERE (HCC): ICD-10-CM

## 2022-01-05 PROCEDURE — G8427 DOCREV CUR MEDS BY ELIG CLIN: HCPCS | Performed by: INTERNAL MEDICINE

## 2022-01-05 PROCEDURE — G8484 FLU IMMUNIZE NO ADMIN: HCPCS | Performed by: INTERNAL MEDICINE

## 2022-01-05 PROCEDURE — 3017F COLORECTAL CA SCREEN DOC REV: CPT | Performed by: INTERNAL MEDICINE

## 2022-01-05 PROCEDURE — 4004F PT TOBACCO SCREEN RCVD TLK: CPT | Performed by: INTERNAL MEDICINE

## 2022-01-05 PROCEDURE — G8419 CALC BMI OUT NRM PARAM NOF/U: HCPCS | Performed by: INTERNAL MEDICINE

## 2022-01-05 PROCEDURE — 3023F SPIROM DOC REV: CPT | Performed by: INTERNAL MEDICINE

## 2022-01-05 PROCEDURE — 99214 OFFICE O/P EST MOD 30 MIN: CPT | Performed by: INTERNAL MEDICINE

## 2022-01-05 RX ORDER — BUDESONIDE AND FORMOTEROL FUMARATE DIHYDRATE 160; 4.5 UG/1; UG/1
2 AEROSOL RESPIRATORY (INHALATION) 2 TIMES DAILY
Qty: 1 EACH | Refills: 5 | Status: SHIPPED | OUTPATIENT
Start: 2022-01-05

## 2022-01-05 NOTE — PROGRESS NOTES
[facility]  Follow Up Note  Dede Sanchez MD       Angelo Bauer   YOB: 1960    Date of Visit:  1/5/2022    Assessment/Plan:  1. SOB (shortness of breath)  Stable  Short of breath with minimal exertion such as walking short distances, dressing and showering    2. COPD, severe ([address]  PFT 12/2020:  Spirometry:  Flow volume loops were obstructed. The FEV-1/FVC ratio was decreased. The FEV-1 was 0.53 liters (22% of predicted), which was severely decreased. The FVC was 1.79 liters (56% of predicted), which was decreased. Response to inhaled bronchodilators (albuterol) was not significant.     Lung volumes:  Lung volumes were tested by plethysmography. The total lung capacity was 14.86 liters (317% of predicted), which was increased. The residual volume was 13.06 liters (741% of predicted), which was increased. The ratio of residual volume to total lung capacity (RV/TLC) was 88, which was increased.      Diffusion capacity was found to be 46% which is Moderately decreased.      Interpretation:  Very severe obstruction noted with severe hyperinflation and no significant bronchodilator response based on 200ml criteria. Medication management:  Symbicort 160/4.5, 2 puffs twice daily  Spiriva once daily  Albuterol as needed. She has both nebulizer from which she benefits and HFA. 3. Centrilobular emphysema ([address]  I reviewed CT imaging which does reveal moderate centrilobular emphysema    4. Pulmonary nodule  Had a small pulmonary nodule on screening CT 1 year ago  Repeat low-dose CT scan    5. Chronic hypoxemic respiratory failure  Her oxygen saturation fell to 87% walking a short distance on flat ground.   She would benefit from supplemental oxygen with exertion and sleep  She is mobile within the home and would benefit from a portable oxygen concentrator      Follow-up in 6 months with Dr. Sarah Johnson      Chief Complaint   Patient presents with   Bronwyn Plascencia of Breath     see's Dr Demetrius Gan but was scheduled with Dr Cristine Thompson by mistake and we had no good contact ingformation to rescedule her       HPI  The patient presents with a chief complaint of moderate shortness of breath of many years duration. She also has mild to moderate associated cough. Exertion is her main modifying factor. She is known to have severe COPD with an FEV1 which is 25% predicted. She is asking if she would benefit from supplemental oxygen. Normally she takes Symbicort and Spiriva. Review of Systems  No complaint of chest pain, nausea or vomiting    History  I have reviewed past medical, surgical, social and family history. This is documented elsewhere in the medical record. Physical Exam:  Well developed, well nourished  Alert and oriented  Sclera is clear  No cervical adenopathy  No JVD. Chest examination is clear. Cardiac examination reveals regular rate and rhythm without murmur, gallop or rub. The abdomen is soft, nontender and nondistended. There is no clubbing, cyanosis or edema of the extremities. There is no obvious skin rash. No focal neuro deficicts  Normal mood and affect    No Known Allergies  Prior to Visit Medications    Medication Sig Taking?  Authorizing Provider   SYMBICORT 160-4.5 MCG/ACT AERO Inhale 2 puffs into the lungs 2 times daily Yes Erick Smith MD   SPIRIVA RESPIMAT 2.5 MCG/ACT AERS inhaler INHALE 2 PUFFS INTO THE LUNGS DAILY  Aviva Quinones MD   budesonide-formoterol (SYMBICORT) 160-4.5 MCG/ACT AERO Inhale 2 puffs into the lungs 2 times daily  Aviva Quinones MD   albuterol (PROVENTIL) (2.5 MG/3ML) 0.083% nebulizer solution Take 3 mLs by nebulization every 6 hours as needed for Wheezing Dx: COPD   ICD-10: J44.9  Aviva Quinones MD   albuterol sulfate HFA (VENTOLIN HFA) 108 (90 Base) MCG/ACT inhaler Inhale 2 puffs into the lungs every 6 hours as needed for Wheezing  Aviva Quinones MD   amitriptyline (ELAVIL) 25 MG tablet Take 25 mg by mouth nightly Historical Provider, MD   escitalopram (LEXAPRO) 10 MG tablet Take 10 mg by mouth daily  Historical Provider, MD   HYDROXYZINE HCL PO Take by mouth  Historical Provider, MD       Vitals:    01/05/22 1201   Pulse: 79   SpO2: 95%     There is no height or weight on file to calculate BMI.      Wt Readings from Last 3 Encounters:   08/17/21 156 lb (70.8 kg)   07/26/21 155 lb 12.8 oz (70.7 kg)   10/30/20 160 lb (72.6 kg)     BP Readings from Last 3 Encounters:   08/17/21 (!) 146/82   07/26/21 126/84   04/22/21 131/79        Social History     Tobacco Use   Smoking Status Current Some Day Smoker    Packs/day: 0.75    Years: 43.00    Pack years: 32.25    Types: Cigarettes   Smokeless Tobacco Never Used   Tobacco Comment    quit 8/3/2021

## 2022-04-29 ENCOUNTER — TELEPHONE (OUTPATIENT)
Dept: CASE MANAGEMENT | Age: 62
End: 2022-04-29

## 2022-05-24 ENCOUNTER — TELEPHONE (OUTPATIENT)
Dept: CASE MANAGEMENT | Age: 62
End: 2022-05-24

## 2022-07-12 ENCOUNTER — TELEPHONE (OUTPATIENT)
Dept: CASE MANAGEMENT | Age: 62
End: 2022-07-12

## 2022-07-29 ENCOUNTER — TELEPHONE (OUTPATIENT)
Dept: PULMONOLOGY | Age: 62
End: 2022-07-29

## 2022-09-26 ENCOUNTER — TELEPHONE (OUTPATIENT)
Dept: PULMONOLOGY | Age: 62
End: 2022-09-26

## 2022-09-26 DIAGNOSIS — J44.9 COPD, SEVERE (HCC): Primary | ICD-10-CM

## 2022-09-26 NOTE — TELEPHONE ENCOUNTER
Mrs Mira Vaz  Had an appointment  on 07/06/2022 but was a no show. I scheduled  Mrs Mira Vaz  a appointment for 10/3/2022.  She needS a refill on her Symbicort 160/4.5mcg(120 oral INH)

## 2022-09-27 RX ORDER — BUDESONIDE AND FORMOTEROL FUMARATE DIHYDRATE 160; 4.5 UG/1; UG/1
2 AEROSOL RESPIRATORY (INHALATION) 2 TIMES DAILY
Qty: 1 EACH | Refills: 0 | Status: SHIPPED | OUTPATIENT
Start: 2022-09-27

## 2022-09-27 RX ORDER — BUDESONIDE AND FORMOTEROL FUMARATE DIHYDRATE 160; 4.5 UG/1; UG/1
2 AEROSOL RESPIRATORY (INHALATION) 2 TIMES DAILY
Qty: 10.2 G | Refills: 5 | Status: SHIPPED | OUTPATIENT
Start: 2022-09-27

## 2023-03-13 DIAGNOSIS — J44.9 COPD, SEVERE (HCC): ICD-10-CM

## 2023-03-13 RX ORDER — BUDESONIDE AND FORMOTEROL FUMARATE DIHYDRATE 160; 4.5 UG/1; UG/1
AEROSOL RESPIRATORY (INHALATION)
Qty: 10.2 G | Refills: 5 | OUTPATIENT
Start: 2023-03-13

## 2023-05-30 ENCOUNTER — TELEPHONE (OUTPATIENT)
Dept: PULMONOLOGY | Age: 63
End: 2023-05-30

## 2023-05-30 NOTE — TELEPHONE ENCOUNTER
Marc Cantu spoke with pt. She will need to be tested for portability. Appointment opened up for tomorrow and pt moved to it.

## 2023-05-30 NOTE — TELEPHONE ENCOUNTER
Patient left VM and is needing a prescription to get an inogen machine.      [facility]: [phone]

## 2023-05-31 ENCOUNTER — OFFICE VISIT (OUTPATIENT)
Dept: PULMONOLOGY | Age: 63
End: 2023-05-31
Payer: MEDICAID

## 2023-05-31 VITALS
SYSTOLIC BLOOD PRESSURE: 130 MMHG | HEART RATE: 98 BPM | WEIGHT: 154 LBS | BODY MASS INDEX: 27.28 KG/M2 | OXYGEN SATURATION: 95 % | DIASTOLIC BLOOD PRESSURE: 86 MMHG

## 2023-05-31 DIAGNOSIS — Z87.891 FORMER SMOKER: ICD-10-CM

## 2023-05-31 DIAGNOSIS — J44.9 COPD, SEVERE (HCC): Primary | ICD-10-CM

## 2023-05-31 PROCEDURE — 99214 OFFICE O/P EST MOD 30 MIN: CPT | Performed by: INTERNAL MEDICINE

## 2023-05-31 RX ORDER — LEVALBUTEROL INHALATION SOLUTION 0.63 MG/3ML
1 SOLUTION RESPIRATORY (INHALATION) EVERY 8 HOURS PRN
Qty: 120 EACH | Refills: 3 | Status: SHIPPED | OUTPATIENT
Start: 2023-05-31

## 2023-05-31 NOTE — PROGRESS NOTES
PULMONARY OFFICE FOLLOW UP NOTE    REASON FOR VISIT:   Chief Complaint   Patient presents with    COPD    Shortness of Breath     Covid in Dec 2022       DATE OF VISIT: 5/31/2023    HISTORY OF PRESENT ILLNESS: 58y.o. year old female is here for follow-up of COPD, FEV1 25%. Patient was last seen in the pulmonary clinic a year ago. She was recently hospitalized at the past about her for COVID infection in December and January 2023. She states that she has been noticing increased shortness of breath as well as weakness especially in her legs after prolonged hospitalization. She continues to take oxygen up to 4 L/min. Denies any increased cough or wheezing or chest pain or hemoptysis. Her bronchodilator regimen consist of Advair twice a day and Spiriva daily. She also takes albuterol inhaler as needed and albuterol nebulization as needed. Patient has significant smoking history of at least 43 pack years. She quit smoking in 2022. DIAGNOSTIC TEST REVIEWED:  I reviewed the PFT from 12/18/2020 and my interpretation is as follows. Severe obstructive airway disease with hyperinflation and reduced diffusion capacity. FEV1/FVC 33  FEV1 25%, 0.62 L  FVC 59%, 1.8 L  %, 14.86 L  DLCO 46%     I reviewed that low-dose CT chest performed on December 2022 and my interpretation is as follows. No PE noted. Bilateral emphysema noted. REVIEW OF SYSTEMS:   CONSTITUTIONAL SYMPTOMS: The patient denies fever, fatigue, night sweats, weight loss or weight gain. HEENT: No vision changes. No tinnitus, Denies sinus pain. No hoarseness, or dysphagia. NECK: Patient denies swelling in the neck. CARDIOVASCULAR: Denies chest pain, palpitation, syncope. RESPIRATORY: see above. GASTROINTESTINAL: Denies nausea, abdominal pain or change in bowel function. GENITOURINARY: Denies obstructive symptoms. No history of incontinence. BREASTS: No masses or lumps in the breasts. SKIN: No rashes or itching.

## 2023-06-22 ENCOUNTER — TELEPHONE (OUTPATIENT)
Dept: PULMONOLOGY | Age: 63
End: 2023-06-22

## 2023-06-22 NOTE — TELEPHONE ENCOUNTER
Spoke to patient she said [facility] needed more information. I called [employer] and notes and retest are in the system. They will send it where it needs to go and let us know if anything else is needed.

## 2023-06-23 NOTE — TELEPHONE ENCOUNTER
Spoke with [facility] again they see order and notes not sure why account is locked and sent to recovery. [facility] spoke with Manager and said emails have been sent to O2 intake and they are working on the problem. Let them know again that patient is out of tanks. They will reach out to her.

## 2023-06-23 NOTE — TELEPHONE ENCOUNTER
Pt called back and said she it totally out of tanks and she said the [facility] is still saying they haven't gotten anything from us.

## 2023-07-05 ENCOUNTER — TELEPHONE (OUTPATIENT)
Dept: PULMONOLOGY | Age: 63
End: 2023-07-05

## 2023-07-05 NOTE — TELEPHONE ENCOUNTER
Called [facility] gave them patients Medicare number which is active. Patient was not aware of it and does not have her medicare card only her [employer] med[employer]. We will see if this helps.

## 2023-07-05 NOTE — TELEPHONE ENCOUNTER
Patient called and said that she had received two tanks from [address] [facility] but said they cant figure out her insurance to get more tanks.      [address] [address]: [phone]

## 2023-09-13 DIAGNOSIS — J44.9 COPD, SEVERE (HCC): ICD-10-CM

## 2023-09-13 RX ORDER — ALBUTEROL SULFATE 90 UG/1
2 AEROSOL, METERED RESPIRATORY (INHALATION) EVERY 6 HOURS PRN
Qty: 18 G | Refills: 3 | Status: SHIPPED | OUTPATIENT
Start: 2023-09-13

## 2023-12-31 DIAGNOSIS — J44.9 COPD, SEVERE (HCC): ICD-10-CM

## 2024-01-02 RX ORDER — ALBUTEROL SULFATE 90 UG/1
2 AEROSOL, METERED RESPIRATORY (INHALATION) EVERY 6 HOURS PRN
Qty: 54 G | Refills: 3 | Status: SHIPPED | OUTPATIENT
Start: 2024-01-02

## 2024-06-01 DIAGNOSIS — J43.2 CENTRILOBULAR EMPHYSEMA (HCC): ICD-10-CM

## 2024-06-03 RX ORDER — TIOTROPIUM BROMIDE INHALATION SPRAY 3.12 UG/1
2 SPRAY, METERED RESPIRATORY (INHALATION) DAILY
Qty: 12 G | Refills: 3 | Status: SHIPPED | OUTPATIENT
Start: 2024-06-03

## 2024-06-10 DIAGNOSIS — J43.2 CENTRILOBULAR EMPHYSEMA (HCC): ICD-10-CM

## 2024-06-12 RX ORDER — FLUTICASONE PROPIONATE AND SALMETEROL XINAFOATE 230; 21 UG/1; UG/1
2 AEROSOL, METERED RESPIRATORY (INHALATION) 2 TIMES DAILY
Qty: 3 EACH | Refills: 3 | Status: SHIPPED | OUTPATIENT
Start: 2024-06-12

## 2024-06-17 ENCOUNTER — OFFICE VISIT (OUTPATIENT)
Dept: PULMONOLOGY | Age: 64
End: 2024-06-17
Payer: MEDICARE

## 2024-06-17 VITALS
SYSTOLIC BLOOD PRESSURE: 110 MMHG | HEART RATE: 100 BPM | DIASTOLIC BLOOD PRESSURE: 80 MMHG | HEIGHT: 63 IN | WEIGHT: 124.6 LBS | OXYGEN SATURATION: 96 % | BODY MASS INDEX: 22.08 KG/M2

## 2024-06-17 DIAGNOSIS — Z87.891 FORMER SMOKER: ICD-10-CM

## 2024-06-17 DIAGNOSIS — J44.9 COPD, SEVERE (HCC): Primary | ICD-10-CM

## 2024-06-17 DIAGNOSIS — J96.11 CHRONIC RESPIRATORY FAILURE WITH HYPOXIA (HCC): ICD-10-CM

## 2024-06-17 DIAGNOSIS — Z87.891 PERSONAL HISTORY OF TOBACCO USE: ICD-10-CM

## 2024-06-17 PROCEDURE — 99214 OFFICE O/P EST MOD 30 MIN: CPT | Performed by: INTERNAL MEDICINE

## 2024-06-17 PROCEDURE — G8420 CALC BMI NORM PARAMETERS: HCPCS | Performed by: INTERNAL MEDICINE

## 2024-06-17 PROCEDURE — 3017F COLORECTAL CA SCREEN DOC REV: CPT | Performed by: INTERNAL MEDICINE

## 2024-06-17 PROCEDURE — G8427 DOCREV CUR MEDS BY ELIG CLIN: HCPCS | Performed by: INTERNAL MEDICINE

## 2024-06-17 PROCEDURE — 1036F TOBACCO NON-USER: CPT | Performed by: INTERNAL MEDICINE

## 2024-06-17 PROCEDURE — 3023F SPIROM DOC REV: CPT | Performed by: INTERNAL MEDICINE

## 2024-06-17 PROCEDURE — G0296 VISIT TO DETERM LDCT ELIG: HCPCS | Performed by: INTERNAL MEDICINE

## 2024-06-17 RX ORDER — FLUTICASONE PROPIONATE AND SALMETEROL XINAFOATE 230; 21 UG/1; UG/1
2 AEROSOL, METERED RESPIRATORY (INHALATION) 2 TIMES DAILY
Qty: 3 EACH | Refills: 3 | Status: SHIPPED | OUTPATIENT
Start: 2024-06-17

## 2024-06-17 NOTE — PROGRESS NOTES
Discussed with the patient the current USPSTF guidelines released March 9, 2021 for screening for lung cancer.    For adults aged 50 to 80 years who have a 20 pack-year smoking history and currently smoke or have quit within the past 15 years the grade B recommendation is to:  Screen for lung cancer with low-dose computed tomography (LDCT) every year.  Stop screening once a person has not smoked for 15 years or has a health problem that limits life expectancy or the ability to have lung surgery.    The patient  reports that she quit smoking about 17 months ago. Her smoking use included cigarettes. She started smoking about 44 years ago. She has a 32.3 pack-year smoking history. She has never used smokeless tobacco.. Discussed with patient the risks and benefits of screening, including over-diagnosis, false positive rate, and total radiation exposure.  The patient currently exhibits no signs or symptoms suggestive of lung cancer.  Discussed with patient the importance of compliance with yearly annual lung cancer screenings and willingness to undergo diagnosis and treatment if screening scan is positive.  In addition, the patient was counseled regarding the importance of remaining smoke free and/or total smoking cessation.    Also reviewed the following if the patient has Medicare that as of February 10, 2022, Medicare only covers LDCT screening in patients aged 50-77 with at least a 20 pack-year smoking history who currently smoke or have quit in the last 15 years  
MG tablet Take 1 tablet by mouth nightly      escitalopram (LEXAPRO) 10 MG tablet Take 1 tablet by mouth daily      HYDROXYZINE HCL PO Take by mouth       No current facility-administered medications on file prior to visit.               ALLERGIES:   Allergies as of 06/17/2024    (No Known Allergies)      OBJECTIVE:   height is 1.6 m (5' 3\") and weight is 56.5 kg (124 lb 9.6 oz). Her blood pressure is 110/80 and her pulse is 100. Her oxygen saturation is 96%.       PHYSICAL EXAM:    CONSTITUTIONAL: She is a 63 y.o.-year-old who appears her stated age. She is alert and oriented x 3 and in no acute distress.   HEENT: No scleral icterus. Atraumatic, normocephalic.  NECK: Supple, without cervical or supraclavicular lymphadenopathy:  CARDIOVASCULAR: S1 S2 RRR. Without murmer  RESPIRATORY & CHEST: Lungs are clear to auscultation and percussion. No wheezing, no crackles. Good air movement  GASTROINTESTINAL & ABDOMEN: Soft, nontender, non-distended  GENITOURINARY: Deferred.   MUSCULOSKELETAL: There is no clubbing. No cyanosis. No edema of the lower extremities.   SKIN OF BODY: No rash or jaundice.   PSYCHIATRIC EVALUATION: Normal affect. Patient answers questions appropriately.   HEMATOLOGIC/LYMPHATIC/ IMMUNOLOGIC: No palpable lymphadenopathy.  NEUROLOGIC: Alert and oriented x 3.Groslly non-focal. Motor strength is 5+/5 in all muscle groups. The patient has a normal sensorium globally.        ASSESSMENT AND PLAN:     1. COPD, severe (HCC)  Patient has severe COPD with FEV1 25%.  Continue Advair inhaler twice a day  Continue Spiriva Respimat 2.5 mcg 2 puffs daily  Patient has side effects with albuterol and Xopenex nebulization including tremors and palpitations.  Switch to ipratropium nebs.    - fluticasone-salmeterol (ADVAIR HFA) 230-21 MCG/ACT inhaler; Inhale 2 puffs into the lungs 2 times daily  Dispense: 3 each; Refill: 3  - ipratropium (ATROVENT) 0.02 % nebulizer solution; Take 2.5 mLs by nebulization every 4 hours as

## 2024-08-02 ENCOUNTER — HOSPITAL ENCOUNTER (OUTPATIENT)
Dept: CT IMAGING | Age: 64
Discharge: HOME | End: 2024-08-02
Attending: INTERNAL MEDICINE
Payer: MEDICARE

## 2024-08-02 DIAGNOSIS — Z87.891 PERSONAL HISTORY OF TOBACCO USE: ICD-10-CM

## 2024-08-02 PROCEDURE — 71271 CT THORAX LUNG CANCER SCR C-: CPT

## 2024-08-14 ENCOUNTER — TELEPHONE (OUTPATIENT)
Dept: CASE MANAGEMENT | Age: 64
End: 2024-08-14

## 2024-08-14 NOTE — TELEPHONE ENCOUNTER
Baseline lung screen done on 8/2/24.  LRAD 2S.  Recommended screen in one year. Results letter mailed. Will follow in the lung screening program.

## 2024-09-17 DIAGNOSIS — J44.9 COPD, SEVERE (HCC): ICD-10-CM

## 2024-09-18 RX ORDER — ALBUTEROL SULFATE 90 UG/1
2 INHALANT RESPIRATORY (INHALATION) EVERY 6 HOURS PRN
Qty: 54 G | Refills: 3 | Status: SHIPPED | OUTPATIENT
Start: 2024-09-18

## 2024-09-23 ENCOUNTER — TELEPHONE (OUTPATIENT)
Dept: PULMONOLOGY | Age: 64
End: 2024-09-23

## 2024-09-23 ENCOUNTER — OFFICE VISIT (OUTPATIENT)
Dept: PULMONOLOGY | Age: 64
End: 2024-09-23
Payer: MEDICARE

## 2024-09-23 VITALS
HEIGHT: 63 IN | OXYGEN SATURATION: 97 % | HEART RATE: 95 BPM | BODY MASS INDEX: 21.97 KG/M2 | SYSTOLIC BLOOD PRESSURE: 108 MMHG | WEIGHT: 124 LBS | DIASTOLIC BLOOD PRESSURE: 68 MMHG

## 2024-09-23 DIAGNOSIS — J96.11 CHRONIC RESPIRATORY FAILURE WITH HYPOXIA: ICD-10-CM

## 2024-09-23 DIAGNOSIS — J44.9 COPD, SEVERE (HCC): Primary | ICD-10-CM

## 2024-09-23 DIAGNOSIS — Z87.891 FORMER SMOKER: ICD-10-CM

## 2024-09-23 PROCEDURE — G8427 DOCREV CUR MEDS BY ELIG CLIN: HCPCS | Performed by: INTERNAL MEDICINE

## 2024-09-23 PROCEDURE — 99214 OFFICE O/P EST MOD 30 MIN: CPT | Performed by: INTERNAL MEDICINE

## 2024-09-23 PROCEDURE — 3023F SPIROM DOC REV: CPT | Performed by: INTERNAL MEDICINE

## 2024-09-23 PROCEDURE — 3017F COLORECTAL CA SCREEN DOC REV: CPT | Performed by: INTERNAL MEDICINE

## 2024-09-23 PROCEDURE — G8420 CALC BMI NORM PARAMETERS: HCPCS | Performed by: INTERNAL MEDICINE

## 2024-09-23 PROCEDURE — 1036F TOBACCO NON-USER: CPT | Performed by: INTERNAL MEDICINE

## 2024-09-23 RX ORDER — BUDESONIDE AND FORMOTEROL FUMARATE DIHYDRATE 160; 4.5 UG/1; UG/1
2 AEROSOL RESPIRATORY (INHALATION) 2 TIMES DAILY
COMMUNITY
Start: 2022-11-23

## 2025-02-25 ENCOUNTER — HOSPITAL ENCOUNTER (INPATIENT)
Age: 65
LOS: 3 days | Discharge: HOME | DRG: 190 | End: 2025-02-28
Attending: INTERNAL MEDICINE | Admitting: INTERNAL MEDICINE
Payer: MEDICARE

## 2025-02-25 ENCOUNTER — APPOINTMENT (OUTPATIENT)
Dept: GENERAL RADIOLOGY | Age: 65
DRG: 190 | End: 2025-02-25
Payer: MEDICARE

## 2025-02-25 DIAGNOSIS — J18.9 PNEUMONIA DUE TO INFECTIOUS ORGANISM, UNSPECIFIED LATERALITY, UNSPECIFIED PART OF LUNG: ICD-10-CM

## 2025-02-25 DIAGNOSIS — J44.1 COPD EXACERBATION (HCC): Primary | ICD-10-CM

## 2025-02-25 DIAGNOSIS — J96.01 ACUTE RESPIRATORY FAILURE WITH HYPOXIA (HCC): ICD-10-CM

## 2025-02-25 DIAGNOSIS — J10.1 INFLUENZA A: ICD-10-CM

## 2025-02-25 DIAGNOSIS — R06.02 SHORTNESS OF BREATH: ICD-10-CM

## 2025-02-25 DIAGNOSIS — R79.89 ELEVATED TROPONIN: ICD-10-CM

## 2025-02-25 PROBLEM — I47.19 ATRIAL TACHYCARDIA: Status: ACTIVE | Noted: 2025-02-25

## 2025-02-25 PROBLEM — E11.9 CONTROLLED TYPE 2 DIABETES MELLITUS, WITH LONG-TERM CURRENT USE OF INSULIN (HCC): Status: ACTIVE | Noted: 2025-02-25

## 2025-02-25 PROBLEM — J96.00 ACUTE RESPIRATORY FAILURE (HCC): Status: ACTIVE | Noted: 2025-02-25

## 2025-02-25 PROBLEM — Z79.4 CONTROLLED TYPE 2 DIABETES MELLITUS, WITH LONG-TERM CURRENT USE OF INSULIN (HCC): Status: ACTIVE | Noted: 2025-02-25

## 2025-02-25 PROBLEM — I10 HTN (HYPERTENSION): Status: ACTIVE | Noted: 2025-02-25

## 2025-02-25 LAB
ALBUMIN SERPL-MCNC: 4.1 G/DL (ref 3.4–5)
ALBUMIN/GLOB SERPL: 1 {RATIO} (ref 1.1–2.2)
ALP SERPL-CCNC: 61 U/L (ref 40–129)
ALT SERPL-CCNC: 44 U/L (ref 10–40)
ANION GAP SERPL CALCULATED.3IONS-SCNC: 12 MMOL/L (ref 3–16)
AST SERPL-CCNC: 54 U/L (ref 15–37)
BACTERIA URNS QL MICRO: NORMAL /HPF
BASE EXCESS BLDV CALC-SCNC: 11.9 MMOL/L (ref -3–3)
BASE EXCESS BLDV CALC-SCNC: 7.6 MMOL/L (ref -3–3)
BASOPHILS # BLD: 0.1 K/UL (ref 0–0.2)
BASOPHILS NFR BLD: 0.4 %
BILIRUB SERPL-MCNC: 0.4 MG/DL (ref 0–1)
BILIRUB UR QL STRIP.AUTO: ABNORMAL
BUN SERPL-MCNC: 23 MG/DL (ref 7–20)
CALCIUM SERPL-MCNC: 10 MG/DL (ref 8.3–10.6)
CHLORIDE SERPL-SCNC: 92 MMOL/L (ref 99–110)
CLARITY UR: CLEAR
CO2 BLDV-SCNC: 89 MMOL/L
CO2 BLDV-SCNC: 91 MMOL/L
CO2 SERPL-SCNC: 35 MMOL/L (ref 21–32)
COHGB MFR BLDV: 2.5 % (ref 0–1.5)
COHGB MFR BLDV: 3.1 % (ref 0–1.5)
COLOR UR: ABNORMAL
CREAT SERPL-MCNC: 0.6 MG/DL (ref 0.6–1.2)
DEPRECATED RDW RBC AUTO: 13.7 % (ref 12.4–15.4)
DO-HGB MFR BLDV: 24 %
EKG ATRIAL RATE: 114 BPM
EKG DIAGNOSIS: NORMAL
EKG P AXIS: 76 DEGREES
EKG P-R INTERVAL: 144 MS
EKG Q-T INTERVAL: 346 MS
EKG QRS DURATION: 76 MS
EKG QTC CALCULATION (BAZETT): 476 MS
EKG R AXIS: 56 DEGREES
EKG T AXIS: 211 DEGREES
EKG VENTRICULAR RATE: 114 BPM
EOSINOPHIL # BLD: 0.1 K/UL (ref 0–0.6)
EOSINOPHIL NFR BLD: 0.4 %
EPI CELLS #/AREA URNS AUTO: 4 /HPF (ref 0–5)
FLUAV RNA RESP QL NAA+PROBE: DETECTED
FLUBV RNA RESP QL NAA+PROBE: NOT DETECTED
GFR SERPLBLD CREATININE-BSD FMLA CKD-EPI: >90 ML/MIN/{1.73_M2}
GLUCOSE BLD-MCNC: 118 MG/DL (ref 70–99)
GLUCOSE BLD-MCNC: 132 MG/DL (ref 70–99)
GLUCOSE SERPL-MCNC: 159 MG/DL (ref 70–99)
GLUCOSE UR STRIP.AUTO-MCNC: NEGATIVE MG/DL
HCO3 BLDV-SCNC: 37.2 MMOL/L (ref 23–29)
HCO3 BLDV-SCNC: 38.7 MMOL/L (ref 23–29)
HCT VFR BLD AUTO: 45.2 % (ref 36–48)
HGB BLD-MCNC: 14.9 G/DL (ref 12–16)
HGB UR QL STRIP.AUTO: NEGATIVE
HYALINE CASTS #/AREA URNS AUTO: 5 /LPF (ref 0–8)
KETONES UR STRIP.AUTO-MCNC: 40 MG/DL
LACTATE BLDV-SCNC: 0.7 MMOL/L (ref 0.4–2)
LEUKOCYTE ESTERASE UR QL STRIP.AUTO: NEGATIVE
LYMPHOCYTES # BLD: 2.6 K/UL (ref 1–5.1)
LYMPHOCYTES NFR BLD: 20.4 %
MCH RBC QN AUTO: 28.7 PG (ref 26–34)
MCHC RBC AUTO-ENTMCNC: 32.9 G/DL (ref 31–36)
MCV RBC AUTO: 87 FL (ref 80–100)
METHGB MFR BLDV: 0.6 %
METHGB MFR BLDV: 0.8 %
MONOCYTES # BLD: 1.2 K/UL (ref 0–1.3)
MONOCYTES NFR BLD: 9.6 %
NEUTROPHILS # BLD: 8.9 K/UL (ref 1.7–7.7)
NEUTROPHILS NFR BLD: 69.2 %
NITRITE UR QL STRIP.AUTO: NEGATIVE
NT-PROBNP SERPL-MCNC: 9303 PG/ML (ref 0–124)
O2 CT VFR BLDV CALC: 16 VOL %
O2 CT VFR BLDV CALC: 18 VOL %
O2 THERAPY: ABNORMAL
O2 THERAPY: ABNORMAL
PCO2 BLDV: 58.8 MMHG (ref 40–50)
PCO2 BLDV: 74.3 MMHG (ref 40–50)
PERFORMED ON: ABNORMAL
PERFORMED ON: ABNORMAL
PH BLDV: 7.31 [PH] (ref 7.35–7.45)
PH BLDV: 7.43 [PH] (ref 7.35–7.45)
PH UR STRIP.AUTO: 6.5 [PH] (ref 5–8)
PLATELET # BLD AUTO: 339 K/UL (ref 135–450)
PMV BLD AUTO: 7.8 FL (ref 5–10.5)
PO2 BLDV: 120 MMHG (ref 25–40)
PO2 BLDV: 46.4 MMHG (ref 25–40)
POTASSIUM SERPL-SCNC: 4.3 MMOL/L (ref 3.5–5.1)
PROCALCITONIN SERPL IA-MCNC: 0.22 NG/ML (ref 0–0.15)
PROT SERPL-MCNC: 8.2 G/DL (ref 6.4–8.2)
PROT UR STRIP.AUTO-MCNC: 30 MG/DL
RBC # BLD AUTO: 5.2 M/UL (ref 4–5.2)
RBC CLUMPS #/AREA URNS AUTO: 2 /HPF (ref 0–4)
REASON FOR REJECTION: NORMAL
REJECTED TEST: NORMAL
SAO2 % BLDV: 76 %
SAO2 % BLDV: 99 %
SARS-COV-2 RNA RESP QL NAA+PROBE: NOT DETECTED
SODIUM SERPL-SCNC: 139 MMOL/L (ref 136–145)
SP GR UR STRIP.AUTO: >=1.03 (ref 1–1.03)
TROPONIN, HIGH SENSITIVITY: 89 NG/L (ref 0–14)
TROPONIN, HIGH SENSITIVITY: 93 NG/L (ref 0–14)
UA DIPSTICK W REFLEX MICRO PNL UR: YES
URN SPEC COLLECT METH UR: ABNORMAL
UROBILINOGEN UR STRIP-ACNC: 1 E.U./DL
WBC # BLD AUTO: 12.8 K/UL (ref 4–11)
WBC #/AREA URNS AUTO: 1 /HPF (ref 0–5)

## 2025-02-25 PROCEDURE — 6360000002 HC RX W HCPCS: Performed by: PHYSICIAN ASSISTANT

## 2025-02-25 PROCEDURE — 83880 ASSAY OF NATRIURETIC PEPTIDE: CPT

## 2025-02-25 PROCEDURE — 2700000000 HC OXYGEN THERAPY PER DAY

## 2025-02-25 PROCEDURE — 87636 SARSCOV2 & INF A&B AMP PRB: CPT

## 2025-02-25 PROCEDURE — 93005 ELECTROCARDIOGRAM TRACING: CPT | Performed by: INTERNAL MEDICINE

## 2025-02-25 PROCEDURE — 85025 COMPLETE CBC W/AUTO DIFF WBC: CPT

## 2025-02-25 PROCEDURE — 2000000000 HC ICU R&B

## 2025-02-25 PROCEDURE — 83605 ASSAY OF LACTIC ACID: CPT

## 2025-02-25 PROCEDURE — 94640 AIRWAY INHALATION TREATMENT: CPT

## 2025-02-25 PROCEDURE — 2500000003 HC RX 250 WO HCPCS: Performed by: INTERNAL MEDICINE

## 2025-02-25 PROCEDURE — 81001 URINALYSIS AUTO W/SCOPE: CPT

## 2025-02-25 PROCEDURE — 71045 X-RAY EXAM CHEST 1 VIEW: CPT

## 2025-02-25 PROCEDURE — 80053 COMPREHEN METABOLIC PANEL: CPT

## 2025-02-25 PROCEDURE — 96365 THER/PROPH/DIAG IV INF INIT: CPT

## 2025-02-25 PROCEDURE — 2580000003 HC RX 258: Performed by: PHYSICIAN ASSISTANT

## 2025-02-25 PROCEDURE — 2500000003 HC RX 250 WO HCPCS: Performed by: PHYSICIAN ASSISTANT

## 2025-02-25 PROCEDURE — 6370000000 HC RX 637 (ALT 250 FOR IP): Performed by: INTERNAL MEDICINE

## 2025-02-25 PROCEDURE — 51702 INSERT TEMP BLADDER CATH: CPT

## 2025-02-25 PROCEDURE — 94761 N-INVAS EAR/PLS OXIMETRY MLT: CPT

## 2025-02-25 PROCEDURE — 96375 TX/PRO/DX INJ NEW DRUG ADDON: CPT

## 2025-02-25 PROCEDURE — 94660 CPAP INITIATION&MGMT: CPT

## 2025-02-25 PROCEDURE — 84484 ASSAY OF TROPONIN QUANT: CPT

## 2025-02-25 PROCEDURE — 6360000002 HC RX W HCPCS: Performed by: INTERNAL MEDICINE

## 2025-02-25 PROCEDURE — 93010 ELECTROCARDIOGRAM REPORT: CPT | Performed by: INTERNAL MEDICINE

## 2025-02-25 PROCEDURE — 84145 PROCALCITONIN (PCT): CPT

## 2025-02-25 PROCEDURE — 82803 BLOOD GASES ANY COMBINATION: CPT

## 2025-02-25 PROCEDURE — 99285 EMERGENCY DEPT VISIT HI MDM: CPT

## 2025-02-25 RX ORDER — LEVALBUTEROL INHALATION SOLUTION 1.25 MG/3ML
1.25 SOLUTION RESPIRATORY (INHALATION) ONCE
Status: COMPLETED | OUTPATIENT
Start: 2025-02-25 | End: 2025-02-25

## 2025-02-25 RX ORDER — DEXMEDETOMIDINE HYDROCHLORIDE 4 UG/ML
.1-1.5 INJECTION, SOLUTION INTRAVENOUS CONTINUOUS
Status: DISCONTINUED | OUTPATIENT
Start: 2025-02-25 | End: 2025-02-27

## 2025-02-25 RX ORDER — BUDESONIDE AND FORMOTEROL FUMARATE DIHYDRATE 160; 4.5 UG/1; UG/1
2 AEROSOL RESPIRATORY (INHALATION)
Status: DISCONTINUED | OUTPATIENT
Start: 2025-02-25 | End: 2025-02-26

## 2025-02-25 RX ORDER — AZITHROMYCIN 250 MG/1
500 TABLET, FILM COATED ORAL DAILY
Status: DISCONTINUED | OUTPATIENT
Start: 2025-02-26 | End: 2025-02-25

## 2025-02-25 RX ORDER — BUDESONIDE AND FORMOTEROL FUMARATE DIHYDRATE 160; 4.5 UG/1; UG/1
2 AEROSOL RESPIRATORY (INHALATION) 2 TIMES DAILY
Status: DISCONTINUED | OUTPATIENT
Start: 2025-02-25 | End: 2025-02-25 | Stop reason: SDUPTHER

## 2025-02-25 RX ORDER — GLUCAGON 1 MG/ML
1 KIT INJECTION PRN
Status: DISCONTINUED | OUTPATIENT
Start: 2025-02-25 | End: 2025-02-28 | Stop reason: HOSPADM

## 2025-02-25 RX ORDER — LORAZEPAM 1 MG/1
1 TABLET ORAL EVERY 6 HOURS PRN
Status: DISCONTINUED | OUTPATIENT
Start: 2025-02-25 | End: 2025-02-26

## 2025-02-25 RX ORDER — DEXTROSE MONOHYDRATE 100 MG/ML
INJECTION, SOLUTION INTRAVENOUS CONTINUOUS PRN
Status: DISCONTINUED | OUTPATIENT
Start: 2025-02-25 | End: 2025-02-28 | Stop reason: HOSPADM

## 2025-02-25 RX ORDER — IPRATROPIUM BROMIDE AND ALBUTEROL SULFATE 2.5; .5 MG/3ML; MG/3ML
1 SOLUTION RESPIRATORY (INHALATION)
Status: DISCONTINUED | OUTPATIENT
Start: 2025-02-25 | End: 2025-02-25

## 2025-02-25 RX ORDER — LEVALBUTEROL INHALATION SOLUTION 1.25 MG/3ML
1.25 SOLUTION RESPIRATORY (INHALATION) EVERY 4 HOURS PRN
Status: DISCONTINUED | OUTPATIENT
Start: 2025-02-25 | End: 2025-02-28 | Stop reason: HOSPADM

## 2025-02-25 RX ORDER — ALBUTEROL SULFATE 90 UG/1
2 INHALANT RESPIRATORY (INHALATION) EVERY 6 HOURS PRN
Status: DISCONTINUED | OUTPATIENT
Start: 2025-02-25 | End: 2025-02-28 | Stop reason: HOSPADM

## 2025-02-25 RX ORDER — IPRATROPIUM BROMIDE AND ALBUTEROL SULFATE 2.5; .5 MG/3ML; MG/3ML
2 SOLUTION RESPIRATORY (INHALATION) ONCE
Status: DISCONTINUED | OUTPATIENT
Start: 2025-02-25 | End: 2025-02-25

## 2025-02-25 RX ORDER — INSULIN LISPRO 100 [IU]/ML
0-4 INJECTION, SOLUTION INTRAVENOUS; SUBCUTANEOUS
Status: DISCONTINUED | OUTPATIENT
Start: 2025-02-25 | End: 2025-02-28 | Stop reason: HOSPADM

## 2025-02-25 RX ORDER — AZITHROMYCIN 250 MG/1
500 TABLET, FILM COATED ORAL DAILY
Status: DISCONTINUED | OUTPATIENT
Start: 2025-02-26 | End: 2025-02-27

## 2025-02-25 RX ORDER — ESCITALOPRAM OXALATE 10 MG/1
10 TABLET ORAL DAILY
Status: DISCONTINUED | OUTPATIENT
Start: 2025-02-25 | End: 2025-02-28 | Stop reason: HOSPADM

## 2025-02-25 RX ADMIN — LORAZEPAM 1 MG: 1 TABLET ORAL at 20:54

## 2025-02-25 RX ADMIN — AZITHROMYCIN MONOHYDRATE 500 MG: 500 INJECTION, POWDER, LYOPHILIZED, FOR SOLUTION INTRAVENOUS at 10:21

## 2025-02-25 RX ADMIN — Medication 2 PUFF: at 20:57

## 2025-02-25 RX ADMIN — AMITRIPTYLINE HYDROCHLORIDE 25 MG: 25 TABLET ORAL at 20:05

## 2025-02-25 RX ADMIN — DEXMEDETOMIDINE HYDROCHLORIDE 0.4 MCG/KG/HR: 4 INJECTION, SOLUTION INTRAVENOUS at 21:18

## 2025-02-25 RX ADMIN — METHYLPREDNISOLONE SODIUM SUCCINATE 125 MG: 125 INJECTION INTRAMUSCULAR; INTRAVENOUS at 10:21

## 2025-02-25 RX ADMIN — LEVALBUTEROL 1.25 MG: 1.25 SOLUTION RESPIRATORY (INHALATION) at 12:20

## 2025-02-25 RX ADMIN — CEFTRIAXONE SODIUM 1000 MG: 1 INJECTION, POWDER, FOR SOLUTION INTRAMUSCULAR; INTRAVENOUS at 10:21

## 2025-02-25 RX ADMIN — WATER 40 MG: 1 INJECTION INTRAMUSCULAR; INTRAVENOUS; SUBCUTANEOUS at 20:06

## 2025-02-25 ASSESSMENT — ENCOUNTER SYMPTOMS
WHEEZING: 0
VOMITING: 0
RHINORRHEA: 0
ABDOMINAL PAIN: 0
SHORTNESS OF BREATH: 1
DIARRHEA: 0
COUGH: 1
NAUSEA: 0

## 2025-02-25 ASSESSMENT — PAIN SCALES - GENERAL: PAINLEVEL_OUTOF10: 0

## 2025-02-25 ASSESSMENT — LIFESTYLE VARIABLES
HOW MANY STANDARD DRINKS CONTAINING ALCOHOL DO YOU HAVE ON A TYPICAL DAY: PATIENT DOES NOT DRINK
HOW OFTEN DO YOU HAVE A DRINK CONTAINING ALCOHOL: NEVER

## 2025-02-25 ASSESSMENT — PAIN - FUNCTIONAL ASSESSMENT: PAIN_FUNCTIONAL_ASSESSMENT: 0-10

## 2025-02-25 NOTE — ED NOTES
Patient Name: Neena Mejia  : 1960 64 y.o.  MRN: [id number]  ED Room #: ED-0016/16     Chief complaint:   Chief Complaint   Patient presents with    Shortness of Breath     Pt brought in per [facility] from home pt reports sudden onset of sob hx of copd recent admission to [facility] for the flu and pneumonia was discharged on .  Denies chest pain.      Hospital Problem/Diagnosis:   Hospital Problems             Last Modified POA    * (Principal) Acute respiratory failure (HCC) 2025 Yes         O2 Flow Rate:O2 Device: Nasal cannula O2 Flow Rate (L/min): 6 L/min (decreased to 5L) (if applicable)  Cardiac Rhythm:   (if applicable)  Active LDA's:   Peripheral IV 25 Left Hand (Active)   Site Assessment Clean, dry & intact 25 0834            How does patient ambulate? Unknown, did not assess in the Emergency Department    2. How does patient take pills? Unknown, no oral medications were given in the Emergency Department    3. Is patient alert? Alert    4. Is patient oriented? To Person, To Place, To Time, To Situation, and Follows Commands    5.   Patient arrived from:  home  Facility Name: ___________________________________________    6. If patient is disoriented or from a [facility] Nursing Facility has family been notified of admission? No    7    8. Any specific patient or family belongings/needs/dynamics?   a.      9. Miscellaneous comments/pending orders?  a.         If there are any additional questions please reach out to the Emergency Department.

## 2025-02-25 NOTE — ED PROVIDER NOTES
In addition to the advanced practice provider, I personally saw Neena Mejia and performed a substantive portion of the visit including all aspects of the medical decision making.    Medical Decision Making  64-year-old female comes to the ER for shortness of breath that started this morning.  Patient was recently diagnosed with pneumonia and influenza.  Patient was admitted at [facility] within the [facility] system.  Patient was discharged 2 days ago.  She states that the shortness of breath started again this morning and progressively got worse.  Patient was a respiratory distress upon arrival.  Heart has a regular rhythm and a rate of about 120 during my exam.  Patient is tachypneic.  Patient has decreased breath sounds along with wheezing bilaterally throughout.  Influenza was found to be positive.  White blood cell count is 12.8.  Troponin was found to be elevated 93.  MP was found to be 9303.  pH is 7.308 with an elevated pCO2 at 74.3.  Procalcitonin 0.22.  Chest x-ray does show a right basilar airspace disease which may be developing pneumonia.  Patient was given DuoNeb breathing treatments along with Solu-Medrol.  Patient was then started on azithromycin and Rocephin.  Patient was placed on BiPAP and is doing better.  EKG does not show any acute findings.  I agree with the assessment and plan of the PA.  Patient is going to be admitted for further evaluation and treatment.  Condition is critical.    Critical care time: 38 minutes.  This excludes separately billable procedures.        EKG  The Ekg interpreted by me in the absence of a cardiologist shows.  sinus tachycardia, rqmk=684  with a rate of 114  Axis is   Normal  QTc is  normal  Intervals and Durations are unremarkable.      No specific ST-T wave changes appreciated.  No evidence of acute ischemia.     SEP-1  Is this patient to be included in the SEP-1 Core Measure due to severe sepsis or septic shock?   No    Screenings     [place] Coma

## 2025-02-25 NOTE — ED PROVIDER NOTES
SCREENINGS        [place] Coma Scale  Eye Opening: Spontaneous  Best Verbal Response: Oriented  Best Motor Response: Obeys commands  [place] Coma Scale Score: 15                CIWA Assessment  BP: (!) 163/119  Pulse: (!) 131           PHYSICAL EXAM  1 or more Elements     ED Triage Vitals   BP Systolic BP Percentile Diastolic BP Percentile Temp Temp Source Pulse Respirations SpO2   02/25/25 0828 -- -- 02/25/25 0828 02/25/25 0828 02/25/25 0825 02/25/25 0825 02/25/25 0825   (!) 163/119   98.3 °F (36.8 °C) Oral (!) 118 28 90 %      Height Weight - Scale         02/25/25 0825 02/25/25 0825         1.6 m (5' 3\") 59 kg (130 lb)             Physical Exam  Constitutional:       Appearance: She is well-developed. She is ill-appearing. She is not diaphoretic.   HENT:      Head: Normocephalic and atraumatic.      Right Ear: External ear normal.      Left Ear: External ear normal.      Nose: Nose normal.   Eyes:      General:         Right eye: No discharge.         Left eye: No discharge.   Pulmonary:      Effort: Respiratory distress present.      Breath sounds: Decreased breath sounds and wheezing present.   Abdominal:      General: There is no distension.      Palpations: Abdomen is soft.      Tenderness: There is no abdominal tenderness.   Musculoskeletal:         General: Normal range of motion.      Cervical back: Normal range of motion and neck supple.   Skin:     General: Skin is warm and dry.   Neurological:      Mental Status: She is alert and oriented to person, place, and time.   Psychiatric:         Behavior: Behavior normal.           DIAGNOSTIC RESULTS   LABS:    Labs Reviewed   COVID-19 & INFLUENZA COMBO - Abnormal; Notable for the following components:       Result Value    Influenza A DETECTED (*)     All other components within normal limits   CBC WITH AUTO DIFFERENTIAL - Abnormal; Notable for the following components:    WBC 12.8 (*)     Neutrophils Absolute 8.9 (*)     All other components within  range)             Is this patient to be included in the SEP-1 Core Measure due to severe sepsis or septic shock?   No   Exclusion criteria - the patient is NOT to be included for SEP-1 Core Measure due to:  Viral etiology found or highly suspected (including COVID-19) without concomitant bacterial infection    Chronic Conditions affecting care:    has a past medical history of COPD (chronic obstructive pulmonary disease) (HCC).    CONSULTS: (Who and What was discussed)  None      Social Determinants Significantly Affecting Health : None    Records Reviewed (External and Source) discharge summary from 2/23/2025    CC/HPI Summary, DDx, ED Course, and Reassessment: Patient presents for evaluation of increasing shortness of breath.  On exam, she is in respiratory distress.  Satting at 86% on 6 L.  She is tachypneic, tachycardic and hypertensive.  She has severe diminished aeration to the right, expiratory wheezing and prolonged expiration to the left.  No obvious rales or rhonchi.  Portable chest x-ray with no evidence of pneumothorax, possible right basilar opacity.  Placed on BiPAP and given DuoNeb breathing treatments and IV Solu-Medrol.  She will be reevaluated.  Please see attending note for EKG interpretation.    Disposition Considerations (tests considered but not done, Admit vs D/C, Shared Decision Making, Pt Expectation of Test or Tx.): CBC and CMP remarkable for white count of 12.8.  She is acidotic with pH of 7.3 and a pCO2 of 74.3.  Troponin elevated at 93 and will be trended.  BNP 9303.  COVID is negative, influenza positive for type A.  Procalcitonin is 0.22.  Chest x-ray again showing possible right basilar airspace disease.  Treated empirically with azithromycin and Rocephin.  Patient significant improvement clinically on reevaluation.  She will be admitted for further evaluation management of respiratory failure secondary to influenza, pneumonia and COPD exacerbation.  I spoke with admitting physician,  Dr. Martinez, who will resume care the patient at this time.  Patient was informed and agreeable.  She is stable for admission.      I am the Primary Clinician of Record.  FINAL IMPRESSION      1. COPD exacerbation (HCC)    2. Influenza A    3. Pneumonia due to infectious organism, unspecified laterality, unspecified part of lung    4. Acute respiratory failure with hypoxia (HCC)          DISPOSITION/PLAN     DISPOSITION Decision To Admit 02/25/2025 09:54:24 AM   DISPOSITION CONDITION Stable           PATIENT REFERRED TO:  No follow-up provider specified.    DISCHARGE MEDICATIONS:  New Prescriptions    No medications on file       DISCONTINUED MEDICATIONS:  Discontinued Medications    No medications on file              (Please note that portions of this note were completed with a voice recognition program.  Efforts were made to edit the dictations but occasionally words are mis-transcribed.)    Radha Holder PA-C (electronically signed)           Radha Holder PA-C  02/25/25 1002

## 2025-02-26 ENCOUNTER — APPOINTMENT (OUTPATIENT)
Age: 65
DRG: 190 | End: 2025-02-26
Attending: STUDENT IN AN ORGANIZED HEALTH CARE EDUCATION/TRAINING PROGRAM
Payer: MEDICARE

## 2025-02-26 PROBLEM — J96.01 ACUTE RESPIRATORY FAILURE WITH HYPOXIA AND HYPERCAPNIA (HCC): Status: ACTIVE | Noted: 2025-02-25

## 2025-02-26 PROBLEM — J96.02 ACUTE RESPIRATORY FAILURE WITH HYPOXIA AND HYPERCAPNIA (HCC): Status: ACTIVE | Noted: 2025-02-25

## 2025-02-26 LAB
ECHO AO ROOT DIAM: 3.4 CM
ECHO AO ROOT INDEX: 2.28 CM/M2
ECHO BSA: 1.48 M2
ECHO LA DIAMETER INDEX: 1.88 CM/M2
ECHO LA DIAMETER: 2.8 CM
ECHO LA TO AORTIC ROOT RATIO: 0.82
ECHO LV EDV A4C: 114 ML
ECHO LV EDV INDEX A4C: 77 ML/M2
ECHO LV EF PHYSICIAN: 33 %
ECHO LV EJECTION FRACTION A4C: 37 %
ECHO LV ESV A4C: 72 ML
ECHO LV ESV INDEX A4C: 48 ML/M2
ECHO LV FRACTIONAL SHORTENING: 13 % (ref 28–44)
ECHO LV INTERNAL DIMENSION DIASTOLE INDEX: 2.68 CM/M2
ECHO LV INTERNAL DIMENSION DIASTOLIC: 4 CM (ref 3.9–5.3)
ECHO LV INTERNAL DIMENSION SYSTOLIC INDEX: 2.35 CM/M2
ECHO LV INTERNAL DIMENSION SYSTOLIC: 3.5 CM
ECHO LV IVSD: 1 CM (ref 0.6–0.9)
ECHO LV MASS 2D: 118.2 G (ref 67–162)
ECHO LV MASS INDEX 2D: 79.3 G/M2 (ref 43–95)
ECHO LV POSTERIOR WALL DIASTOLIC: 0.9 CM (ref 0.6–0.9)
ECHO LV RELATIVE WALL THICKNESS RATIO: 0.45
ECHO LVOT AREA: 2.5 CM2
ECHO LVOT DIAM: 1.8 CM
ECHO MV A VELOCITY: 1 M/S
ECHO MV E VELOCITY: 0.66 M/S
ECHO MV E/A RATIO: 0.66
ECHO PV MAX VELOCITY: 1.4 M/S
ECHO PV PEAK GRADIENT: 8 MMHG
ECHO RA AREA 4C: 5.5 CM2
ECHO RA END SYSTOLIC VOLUME APICAL 4 CHAMBER INDEX BSA: 5 ML/M2
ECHO RA VOLUME: 8 ML
ECHO RV BASAL DIMENSION: 1.5 CM
ECHO RV FREE WALL PEAK S': 9.8 CM/S
ECHO RV MID DIMENSION: 1 CM
ECHO RV TAPSE: 2.4 CM (ref 1.7–?)
ECHO TV REGURGITANT MAX VELOCITY: 2.46 M/S
ECHO TV REGURGITANT PEAK GRADIENT: 24 MMHG
GLUCOSE BLD-MCNC: 117 MG/DL (ref 70–99)
GLUCOSE BLD-MCNC: 129 MG/DL (ref 70–99)
GLUCOSE BLD-MCNC: 141 MG/DL (ref 70–99)
GLUCOSE BLD-MCNC: 221 MG/DL (ref 70–99)
ORGANISM: ABNORMAL
PERFORMED ON: ABNORMAL
REPORT: NORMAL
RESP PATH DNA+RNA PNL NPH NAA+NON-PROBE: ABNORMAL

## 2025-02-26 PROCEDURE — 2060000000 HC ICU INTERMEDIATE R&B

## 2025-02-26 PROCEDURE — 94761 N-INVAS EAR/PLS OXIMETRY MLT: CPT

## 2025-02-26 PROCEDURE — 2500000003 HC RX 250 WO HCPCS: Performed by: STUDENT IN AN ORGANIZED HEALTH CARE EDUCATION/TRAINING PROGRAM

## 2025-02-26 PROCEDURE — 93306 TTE W/DOPPLER COMPLETE: CPT

## 2025-02-26 PROCEDURE — 6370000000 HC RX 637 (ALT 250 FOR IP): Performed by: INTERNAL MEDICINE

## 2025-02-26 PROCEDURE — 2700000000 HC OXYGEN THERAPY PER DAY

## 2025-02-26 PROCEDURE — 6360000002 HC RX W HCPCS: Performed by: INTERNAL MEDICINE

## 2025-02-26 PROCEDURE — 99291 CRITICAL CARE FIRST HOUR: CPT | Performed by: INTERNAL MEDICINE

## 2025-02-26 PROCEDURE — 1200000000 HC SEMI PRIVATE

## 2025-02-26 PROCEDURE — 6360000002 HC RX W HCPCS: Performed by: STUDENT IN AN ORGANIZED HEALTH CARE EDUCATION/TRAINING PROGRAM

## 2025-02-26 PROCEDURE — 6370000000 HC RX 637 (ALT 250 FOR IP): Performed by: STUDENT IN AN ORGANIZED HEALTH CARE EDUCATION/TRAINING PROGRAM

## 2025-02-26 PROCEDURE — 2500000003 HC RX 250 WO HCPCS: Performed by: INTERNAL MEDICINE

## 2025-02-26 PROCEDURE — 94640 AIRWAY INHALATION TREATMENT: CPT

## 2025-02-26 PROCEDURE — 0202U NFCT DS 22 TRGT SARS-COV-2: CPT

## 2025-02-26 PROCEDURE — 94660 CPAP INITIATION&MGMT: CPT

## 2025-02-26 PROCEDURE — 93306 TTE W/DOPPLER COMPLETE: CPT | Performed by: INTERNAL MEDICINE

## 2025-02-26 RX ORDER — OSELTAMIVIR PHOSPHATE 75 MG/1
75 CAPSULE ORAL 2 TIMES DAILY
Status: DISCONTINUED | OUTPATIENT
Start: 2025-02-26 | End: 2025-02-26

## 2025-02-26 RX ORDER — POTASSIUM CHLORIDE 29.8 MG/ML
20 INJECTION INTRAVENOUS PRN
Status: DISCONTINUED | OUTPATIENT
Start: 2025-02-26 | End: 2025-02-28 | Stop reason: HOSPADM

## 2025-02-26 RX ORDER — IPRATROPIUM BROMIDE AND ALBUTEROL SULFATE 2.5; .5 MG/3ML; MG/3ML
1 SOLUTION RESPIRATORY (INHALATION)
Status: DISCONTINUED | OUTPATIENT
Start: 2025-02-26 | End: 2025-02-28 | Stop reason: HOSPADM

## 2025-02-26 RX ORDER — BUDESONIDE 0.5 MG/2ML
0.5 INHALANT ORAL
Status: DISCONTINUED | OUTPATIENT
Start: 2025-02-26 | End: 2025-02-28 | Stop reason: HOSPADM

## 2025-02-26 RX ORDER — ENOXAPARIN SODIUM 100 MG/ML
30 INJECTION SUBCUTANEOUS DAILY
Status: DISCONTINUED | OUTPATIENT
Start: 2025-02-26 | End: 2025-02-28 | Stop reason: HOSPADM

## 2025-02-26 RX ORDER — MAGNESIUM SULFATE IN WATER 40 MG/ML
2000 INJECTION, SOLUTION INTRAVENOUS PRN
Status: DISCONTINUED | OUTPATIENT
Start: 2025-02-26 | End: 2025-02-28 | Stop reason: HOSPADM

## 2025-02-26 RX ORDER — ALPRAZOLAM 0.5 MG
0.5 TABLET ORAL 2 TIMES DAILY
Status: DISCONTINUED | OUTPATIENT
Start: 2025-02-26 | End: 2025-02-28 | Stop reason: HOSPADM

## 2025-02-26 RX ORDER — POTASSIUM CHLORIDE 7.45 MG/ML
10 INJECTION INTRAVENOUS PRN
Status: DISCONTINUED | OUTPATIENT
Start: 2025-02-26 | End: 2025-02-28 | Stop reason: HOSPADM

## 2025-02-26 RX ORDER — ARFORMOTEROL TARTRATE 15 UG/2ML
15 SOLUTION RESPIRATORY (INHALATION)
Status: DISCONTINUED | OUTPATIENT
Start: 2025-02-26 | End: 2025-02-28 | Stop reason: HOSPADM

## 2025-02-26 RX ADMIN — LORAZEPAM 1 MG: 1 TABLET ORAL at 03:42

## 2025-02-26 RX ADMIN — WATER 40 MG: 1 INJECTION INTRAMUSCULAR; INTRAVENOUS; SUBCUTANEOUS at 06:06

## 2025-02-26 RX ADMIN — DEXMEDETOMIDINE HYDROCHLORIDE 0.2 MCG/KG/HR: 4 INJECTION, SOLUTION INTRAVENOUS at 08:44

## 2025-02-26 RX ADMIN — ALPRAZOLAM 0.5 MG: 0.5 TABLET ORAL at 21:12

## 2025-02-26 RX ADMIN — ARFORMOTEROL TARTRATE 15 MCG: 15 SOLUTION RESPIRATORY (INHALATION) at 19:20

## 2025-02-26 RX ADMIN — WATER 40 MG: 1 INJECTION INTRAMUSCULAR; INTRAVENOUS; SUBCUTANEOUS at 18:03

## 2025-02-26 RX ADMIN — AMITRIPTYLINE HYDROCHLORIDE 25 MG: 25 TABLET ORAL at 21:12

## 2025-02-26 RX ADMIN — ALPRAZOLAM 0.5 MG: 0.5 TABLET ORAL at 09:26

## 2025-02-26 RX ADMIN — WATER 1000 MG: 1 INJECTION INTRAMUSCULAR; INTRAVENOUS; SUBCUTANEOUS at 10:50

## 2025-02-26 RX ADMIN — ARFORMOTEROL TARTRATE 15 MCG: 15 SOLUTION RESPIRATORY (INHALATION) at 10:34

## 2025-02-26 RX ADMIN — LEVALBUTEROL HYDROCHLORIDE 1.25 MG: 1.25 SOLUTION RESPIRATORY (INHALATION) at 15:07

## 2025-02-26 RX ADMIN — AZITHROMYCIN DIHYDRATE 500 MG: 250 TABLET, FILM COATED ORAL at 09:26

## 2025-02-26 RX ADMIN — BUDESONIDE 500 MCG: 0.5 INHALANT RESPIRATORY (INHALATION) at 10:34

## 2025-02-26 RX ADMIN — LEVALBUTEROL HYDROCHLORIDE 1.25 MG: 1.25 SOLUTION RESPIRATORY (INHALATION) at 19:20

## 2025-02-26 RX ADMIN — ESCITALOPRAM OXALATE 10 MG: 10 TABLET ORAL at 09:26

## 2025-02-26 RX ADMIN — INSULIN LISPRO 1 UNITS: 100 INJECTION, SOLUTION INTRAVENOUS; SUBCUTANEOUS at 18:02

## 2025-02-26 RX ADMIN — LEVALBUTEROL HYDROCHLORIDE 1.25 MG: 1.25 SOLUTION RESPIRATORY (INHALATION) at 10:34

## 2025-02-26 RX ADMIN — ENOXAPARIN SODIUM 30 MG: 100 INJECTION SUBCUTANEOUS at 09:25

## 2025-02-26 RX ADMIN — BUDESONIDE 500 MCG: 0.5 INHALANT RESPIRATORY (INHALATION) at 19:20

## 2025-02-26 NOTE — PROGRESS NOTES
COMPLEX ROUNDS CHECKLIST    C Comfort - Pain Management/Sedation [] Propofol ( )  [] Fentanyl ( )  [x] Precedex (Dose (mg/hr) Dexmedetomidine: 0.0118 mg/hr)  [] Versed ( )  [] PRN Pain Medication  [] N/A  Santos Agitation Sedation Scale (RASS): Alert & Calm-Spontaneously pays attention to caregiver   O Organisms   *Active isolation*   [x] Droplet   M Mechanical Ventilation/Oxygenation [x]O2 Device: PAP (positive airway pressure)       O2 Flow Rate (L/min): 4 L/min   FiO2 : 30 %      B: Both Spontaneous Awakening and Breathing Trials  Pulse: 100  SpO2: 94 %      P Prophylaxis [x] ABX (Days on abx 2)  [] GI   [x] VTE    [] Bowel regimen ( )   L Lines/Labs   [x] No Central Lines (Line Days:2)    [x] Urinary Catheter: None (Urinary Catheter Days: 0)   [] Routine Labs ordered for next day   E Enteral/Parenteral Nutrition [] Tube feed (Rate **, Goal **)  [] TPN   [x] PO Diet  [] NPO  [] Advance as tolerated   [] Awaiting/Following recommendation from SLP   X X-ray (Do we need one tomorrow?)   [] CXR   [] KUB  [x] No   C  A  R  E Care Team comments/ Family Concerns Team Members Present During Rounds: Primary RN, Charge RN, Critical Care Provider, Hospitalist, Pharmacist, and Dietician   Daily Goals/Plan of the day  Change COPD medications   Add Xanax, wean off Precedex   Bipap as needed     This note is completed during interdisciplinary rounds in collaboration with the provider. Please see the critical care and/or hospitalist note for additional clarification.

## 2025-02-26 NOTE — PROGRESS NOTES
02/26/25 0005   NIV Type   $NIV $Daily Charge   Ventilator ID [id number]   Suction Setup and Functional Yes   NIV Started/Stopped On   Equipment Type V60   Mode Bilevel   Mask Type Full face mask   Mask Size Medium   Assessment   Pulse 81   Respirations 22   SpO2 100 %   Breath Sounds   Respiratory Pattern Tachypneic   Breath Sounds Bilateral Diminished   Settings/Measurements   PIP Observed 8 cm H20   IPAP 8 cmH20   CPAP/EPAP 5 cmH2O   Vt (Measured) 549 mL   Rate Ordered 12   FiO2  40 %   I Time/ I Time % 1 s   Minute Volume (L/min) 10.3 Liters   Mask Leak (lpm) 14 lpm   Alarm Settings   Alarms On Y   Low Pressure (cmH2O) 3 cmH2O   High Pressure (cmH2O) 30 cmH2O   RR Low (bpm) 13   RR High (bpm) 40 br/min   Oxygen Therapy/Pulse Ox   O2 Therapy Oxygen   O2 Device PAP (positive airway pressure)

## 2025-02-26 NOTE — PROGRESS NOTES
Patient refused ABG d/t she stated she is ok she is just having a panic attack and needs to calm down.   She said \"let that medicine do it's job\"         Abg not completed

## 2025-02-26 NOTE — PROGRESS NOTES
Hospital Problems             Last Modified POA    * (Principal) Acute respiratory failure (HCC) 2/25/2025 Yes    COPD exacerbation ([facility]) 2/25/2025 Yes    Pneumonia involving left lung 2/25/2025 Yes    HTN (hypertension) 2/25/2025 Yes    Controlled type 2 diabetes mellitus, with long-term current use of insulin ([facility]) 2/25/2025 Yes   H&P dictated

## 2025-02-26 NOTE — CONSULTS
PULMONARY AND CRITICAL CARE MEDICINE CONSULTATION NOTE    CONSULTING PHYSICIAN:  Steven Kearney DO    ADMISSION DATE: 2/25/2025  ADMISSION DIAGNOSIS: Acute respiratory failure (HCC) [J96.00]  Influenza A [J10.1]  Elevated troponin [R79.89]  COPD exacerbation (HCC) [J44.1]  Acute respiratory failure with hypoxia (HCC) [J96.01]  Pneumonia due to infectious organism, unspecified laterality, unspecified part of lung [J18.9]    REASON FOR CONSULT:   Chief Complaint   Patient presents with    Shortness of Breath     Pt brought in per [facility] from home pt reports sudden onset of sob hx of copd recent admission to [facility] for the flu and pneumonia was discharged on Sunday.  Denies chest pain.        DATE OF CONSULT: 2/25/2025    HISTORY OF PRESENT ILLNESS: 64 y.o. year old female with a past medical history significant for severe COPD, chronic hypoxic respiratory failure, previous history of tobacco abuse presented to the hospital with shortness of breath and intermittent cough.  Patient was recently admitted to [facility] with COPD exacerbation.  Found to have influenza A infection as well as superadded pneumonia.  Was treated with oseltamavir and antibiotics.  Discharged 2 days ago.  Spent 2 days at home and then started having increased shortness of breath with increased anxiety.  This time she came into [facility].  She was seen to be in both acute on chronic hypoxic and hypercapnic respiratory failure.  Did require BiPAP support overnight.  Admitted to medical ICU for close observation.    At the time of my evaluation, sitting in bed in mild respiratory distress.  Reports that her breathing is slightly better.  She feels that she is extremely anxious and that is why she is breathing faster.  Currently on 4 L/min of oxygen supplementation saturating in low to mid 90s.  Does have intermittent cough but minimal to no expectoration.  Denies any chest pain or chest tightness.   random word insertions, pronoun errors and incomplete sentences are occasional consequences of this technology due to software limitations. If there are questions or concerns about the content of this note of information contained within the body of this dictation they should be addressed with the provider for clarification.

## 2025-02-26 NOTE — PROGRESS NOTES
Patient took off bipap   She stated that she was \"better\" she just wanted the oxygen on now. Her attack was over she said.

## 2025-02-26 NOTE — PROGRESS NOTES
02/25/25 2101   NIV Type   Suction Setup and Functional No (comment)   Equipment Type V60   Mode Bilevel   Mask Type Full face mask   Mask Size Medium   Breath Sounds   Respiratory Pattern Tachypneic   Breath Sounds Bilateral Crackles    Settings/Measurements   PIP Observed 13 cm H20   IPAP 8 cmH20   CPAP/EPAP 5 cmH2O   Vt (Measured) 869 mL   Rate Ordered 12   FiO2  60 %   I Time/ I Time % 1 s   Minute Volume (L/min) 12.7 Liters   Mask Leak (lpm) 0 lpm   Patient's Home Machine No   Alarm Settings   Alarms On Y  (alarm set at 6)   Low Pressure (cmH2O) 3 cmH2O   High Pressure (cmH2O) 30 cmH2O   RR Low (bpm) 13   RR High (bpm) 40 br/min

## 2025-02-26 NOTE — PROGRESS NOTES
Patient has had decreased UOP.    20ml an hour is amount of urine out up at this time.   Notified NP Bianca Fitzgerald waiting for response

## 2025-02-26 NOTE — PROGRESS NOTES
Pt arrived from  to ICU, extremely anxious and c/o SOB. Spoke to Dr. Aguila who ordered Precedex gtt, ABG, UA and okeefe d/t urinary retention. Pt states she hasn't voided in \"two days.\"

## 2025-02-26 NOTE — PROGRESS NOTES
Vs obtained, hs meds given, pt transferred to ICU room 3, report given to nurse at bedside. Pt does not have any personal belongings at bedside. Tele box removed from pt.

## 2025-02-26 NOTE — PROGRESS NOTES
Patient once mask was off immediately had difficulty breathing.  This nurse placed mask back on her face.   Explained to her that she is going to wear this mask for a while and to leave it alone and stop taking it off     Patient agreed

## 2025-02-26 NOTE — PROGRESS NOTES
Hospitalist Progress Note    Name:  Neena Mejia    /Age/Sex: 1960  (64 y.o. female)  MRN & CSN:  [id number] & [id number]    PCP: Haim Malagon DO    Date of Admission: 2025    Patient Status:  Inpatient     Chief Complaint:   Chief Complaint   Patient presents with    Shortness of Breath     Pt brought in [facility] from home pt reports sudden onset of sob hx of copd recent admission to [facility] for the flu and pneumonia was discharged on .  Denies chest pain.        Hospital Course:   Patient admitted for shortness of breath.  Recently treated for influenza A and completed Tamiflu course.  On admission, tested positive again for influenza A.  Imaging indicative of possible pneumonia.  Patient started on antibiotics.    Patient was on the medical floor when she started to have worsening hypoxia and anxiety.  Patient transferred to ICU and started on Precedex drip and BiPAP.  CCM consulted.    Subjective:  Today is:  Hospital Day: 2.  Patient seen and examined in ICU-3903/3903-01.     Lying in bed.  Anxious.  Not on BiPAP.  Denies pain.      Medications:  Reviewed    Infusion Medications    dextrose      dexmedeTOMIDine 0.2 mcg/kg/hr (25 0856)     Scheduled Medications    enoxaparin  30 mg SubCUTAneous Daily    cefTRIAXone (ROCEPHIN) IV  1,000 mg IntraVENous Q24H    methylPREDNISolone  40 mg IntraVENous Q12H    arformoterol tartrate  15 mcg Nebulization BID RT    budesonide  0.5 mg Nebulization BID RT    ipratropium 0.5 mg-albuterol 2.5 mg  1 Dose Inhalation Q6H RT    ALPRAZolam  0.5 mg Oral BID    azithromycin  500 mg Oral Daily    insulin lispro  0-4 Units SubCUTAneous 4x Daily AC & HS    amitriptyline  25 mg Oral Nightly    escitalopram  10 mg Oral Daily     PRN Meds: sodium phosphate 15 mmol in sodium chloride 0.9 % 250 mL IVPB, magnesium sulfate, potassium chloride **OR** potassium chloride, sulfur hexafluoride microspheres, [Held by provider] albuterol  sulfate HFA, dextrose bolus **OR** dextrose bolus, glucagon (rDNA), dextrose, levalbuterol      Intake/Output Summary (Last 24 hours) at 2/26/2025 1120  Last data filed at 2/26/2025 0712  Gross per 24 hour   Intake 60.72 ml   Output 920 ml   Net -859.28 ml       Physical Exam Performed:    /80   Pulse 97   Temp 98 °F (36.7 °C) (Temporal)   Resp 18   Ht 1.6 m (5' 3\")   Wt 49 kg (108 lb)   SpO2 98%   BMI 19.13 kg/m²     General appearance: Extremely anxious, appears stated age and cooperative.   HEENT: Pupils equal, round, and reactive to light. Conjunctivae/corneas clear.  NC present.  Neck: Supple, with full range of motion. No jugular venous distention. Trachea midline.  Respiratory:  Normal respiratory effort.  Bilateral expiratory wheezes noted.  Cardiovascular: Regular rate and rhythm with normal S1/S2 without murmurs, rubs or gallops. No peripheral edema.  Abdomen: Soft, non-tender, non-distended with normal bowel sounds.  : No CVA tenderness.  Musculoskeletal: No clubbing or cyanosis.  Full range of motion without deformity.  Skin: Skin color, texture, turgor normal.  No rashes or lesions.  Neurologic:  Neurovascularly intact without any focal sensory/motor deficits. Cranial nerves: II-XII intact, grossly non-focal.  Psychiatric: Alert and oriented, thought content appropriate, normal insight  Capillary Refill: Brisk, 3 seconds, normal   Peripheral Pulses: +2 palpable, equal bilaterally       Labs:   Recent Labs     02/25/25  0836   WBC 12.8*   HGB 14.9   HCT 45.2        Recent Labs     02/25/25  0836      K 4.3   CL 92*   CO2 35*   BUN 23*   CREATININE 0.6   CALCIUM 10.0     Recent Labs     02/25/25  0836   AST 54*   ALT 44*   BILITOT 0.4   ALKPHOS 61     No results for input(s): \"INR\" in the last 72 hours.  No results for input(s): \"CKTOTAL\", \"TROPONINI\" in the last 72 hours.    Urinalysis:      Lab Results   Component Value Date/Time    NITRU Negative 02/25/2025 09:20 PM    WBCUA

## 2025-02-26 NOTE — H&P
[place] [address]                           HISTORY & PHYSICAL      PATIENT NAME: ALCIRA PARHAM              : 1960  MED REC NO: [id number]                      ROOM: ICU-3903  ACCOUNT NO: [id number]                       ADMIT DATE: 2025  PROVIDER: Jessica Aguila MD      HISTORY OF PRESENT ILLNESS:  A 64-year-old white American lady with severe COPD, came to the emergency room with history of increasing shortness of breath, cough, wheezing, low O2 saturation.  The patient was recently admitted at [facility] within the [facility].  The patient was discharged 2 days ago.  Stated that the shortness of breath started again this morning, progressively worse. The patient also was tachycardic and tachypneic.  Denied any angina pectoris.  Her white count was high.  Troponin was elevated.  An ABG shows severe CO2 retention.    PAST MEDICAL HISTORY:  COPD, bronchitis, hospitalization with pneumonia.    PAST SURGICAL HISTORY:  Pertinent for tubal ligation and .    FAMILY HISTORY:  Both the parents are .  Mother had a stroke.  Father had a cancer.    SOCIAL HISTORY:  She is .  She has 4 children, now there are 2 living.  She quit smoking 3 years ago.  Before that, she used to smoke half a pack a day.  No history of alcohol or drug use.  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    REVIEW OF SYSTEMS:  Negative for loss of consciousness.  The patient appears tachypneic with a low O2 saturation.  No angina pectoris.  No dysphagia.  No hematemesis or melena.  No genitourinary complaints.  Does have occasional dyspnea on exertion and orthopnea.    PHYSICAL EXAMINATION:  GENERAL: Alert, awake, oriented x3.  Moderately distressed. 64-year-old white American lady looking somewhat older than her stated age.  VITAL SIGNS: Her temperature is 98.3, blood pressure 163/119,

## 2025-02-27 PROBLEM — E43 SEVERE MALNUTRITION: Chronic | Status: ACTIVE | Noted: 2025-02-27

## 2025-02-27 LAB
ALBUMIN SERPL-MCNC: 3.6 G/DL (ref 3.4–5)
ANION GAP SERPL CALCULATED.3IONS-SCNC: 11 MMOL/L (ref 3–16)
BASOPHILS # BLD: 0 K/UL (ref 0–0.2)
BASOPHILS NFR BLD: 0.3 %
BUN SERPL-MCNC: 22 MG/DL (ref 7–20)
CALCIUM SERPL-MCNC: 9.7 MG/DL (ref 8.3–10.6)
CHLORIDE SERPL-SCNC: 98 MMOL/L (ref 99–110)
CO2 SERPL-SCNC: 31 MMOL/L (ref 21–32)
CREAT SERPL-MCNC: 0.5 MG/DL (ref 0.6–1.2)
DEPRECATED RDW RBC AUTO: 14 % (ref 12.4–15.4)
EOSINOPHIL # BLD: 0 K/UL (ref 0–0.6)
EOSINOPHIL NFR BLD: 0.1 %
GFR SERPLBLD CREATININE-BSD FMLA CKD-EPI: >90 ML/MIN/{1.73_M2}
GLUCOSE BLD-MCNC: 134 MG/DL (ref 70–99)
GLUCOSE BLD-MCNC: 191 MG/DL (ref 70–99)
GLUCOSE BLD-MCNC: 81 MG/DL (ref 70–99)
GLUCOSE BLD-MCNC: 92 MG/DL (ref 70–99)
GLUCOSE SERPL-MCNC: 118 MG/DL (ref 70–99)
HCT VFR BLD AUTO: 40.3 % (ref 36–48)
HGB BLD-MCNC: 13.2 G/DL (ref 12–16)
LYMPHOCYTES # BLD: 1.7 K/UL (ref 1–5.1)
LYMPHOCYTES NFR BLD: 14.7 %
MAGNESIUM SERPL-MCNC: 2.04 MG/DL (ref 1.8–2.4)
MCH RBC QN AUTO: 29 PG (ref 26–34)
MCHC RBC AUTO-ENTMCNC: 32.9 G/DL (ref 31–36)
MCV RBC AUTO: 88.1 FL (ref 80–100)
MONOCYTES # BLD: 1.2 K/UL (ref 0–1.3)
MONOCYTES NFR BLD: 9.9 %
NEUTROPHILS # BLD: 8.8 K/UL (ref 1.7–7.7)
NEUTROPHILS NFR BLD: 75 %
PERFORMED ON: ABNORMAL
PERFORMED ON: ABNORMAL
PERFORMED ON: NORMAL
PERFORMED ON: NORMAL
PHOSPHATE SERPL-MCNC: 3.5 MG/DL (ref 2.5–4.9)
PLATELET # BLD AUTO: 334 K/UL (ref 135–450)
PMV BLD AUTO: 7.5 FL (ref 5–10.5)
POTASSIUM SERPL-SCNC: 4.2 MMOL/L (ref 3.5–5.1)
RBC # BLD AUTO: 4.57 M/UL (ref 4–5.2)
SODIUM SERPL-SCNC: 140 MMOL/L (ref 136–145)
WBC # BLD AUTO: 11.7 K/UL (ref 4–11)

## 2025-02-27 PROCEDURE — 6360000002 HC RX W HCPCS: Performed by: INTERNAL MEDICINE

## 2025-02-27 PROCEDURE — 97116 GAIT TRAINING THERAPY: CPT

## 2025-02-27 PROCEDURE — 6370000000 HC RX 637 (ALT 250 FOR IP): Performed by: INTERNAL MEDICINE

## 2025-02-27 PROCEDURE — 97535 SELF CARE MNGMENT TRAINING: CPT

## 2025-02-27 PROCEDURE — 2700000000 HC OXYGEN THERAPY PER DAY

## 2025-02-27 PROCEDURE — 1200000000 HC SEMI PRIVATE

## 2025-02-27 PROCEDURE — 99233 SBSQ HOSP IP/OBS HIGH 50: CPT | Performed by: INTERNAL MEDICINE

## 2025-02-27 PROCEDURE — 97530 THERAPEUTIC ACTIVITIES: CPT

## 2025-02-27 PROCEDURE — 94761 N-INVAS EAR/PLS OXIMETRY MLT: CPT

## 2025-02-27 PROCEDURE — 85025 COMPLETE CBC W/AUTO DIFF WBC: CPT

## 2025-02-27 PROCEDURE — 6370000000 HC RX 637 (ALT 250 FOR IP): Performed by: STUDENT IN AN ORGANIZED HEALTH CARE EDUCATION/TRAINING PROGRAM

## 2025-02-27 PROCEDURE — 5A09357 ASSISTANCE WITH RESPIRATORY VENTILATION, LESS THAN 24 CONSECUTIVE HOURS, CONTINUOUS POSITIVE AIRWAY PRESSURE: ICD-10-PCS | Performed by: INTERNAL MEDICINE

## 2025-02-27 PROCEDURE — 97166 OT EVAL MOD COMPLEX 45 MIN: CPT

## 2025-02-27 PROCEDURE — 80069 RENAL FUNCTION PANEL: CPT

## 2025-02-27 PROCEDURE — 83735 ASSAY OF MAGNESIUM: CPT

## 2025-02-27 PROCEDURE — 94640 AIRWAY INHALATION TREATMENT: CPT

## 2025-02-27 PROCEDURE — 97162 PT EVAL MOD COMPLEX 30 MIN: CPT

## 2025-02-27 PROCEDURE — 6360000002 HC RX W HCPCS: Performed by: STUDENT IN AN ORGANIZED HEALTH CARE EDUCATION/TRAINING PROGRAM

## 2025-02-27 PROCEDURE — 94660 CPAP INITIATION&MGMT: CPT

## 2025-02-27 PROCEDURE — 2500000003 HC RX 250 WO HCPCS: Performed by: INTERNAL MEDICINE

## 2025-02-27 RX ORDER — PREDNISONE 10 MG/1
10 TABLET ORAL DAILY
Status: DISCONTINUED | OUTPATIENT
Start: 2025-03-04 | End: 2025-02-28 | Stop reason: HOSPADM

## 2025-02-27 RX ORDER — PREDNISONE 1 MG/1
1 TABLET ORAL DAILY
Status: DISCONTINUED | OUTPATIENT
Start: 2025-03-08 | End: 2025-02-28 | Stop reason: HOSPADM

## 2025-02-27 RX ORDER — PREDNISONE 5 MG/1
5 TABLET ORAL DAILY
Status: DISCONTINUED | OUTPATIENT
Start: 2025-03-06 | End: 2025-02-28 | Stop reason: HOSPADM

## 2025-02-27 RX ORDER — PREDNISONE 20 MG/1
20 TABLET ORAL DAILY
Status: DISCONTINUED | OUTPATIENT
Start: 2025-03-02 | End: 2025-02-28 | Stop reason: HOSPADM

## 2025-02-27 RX ORDER — PREDNISONE 20 MG/1
40 TABLET ORAL DAILY
Status: DISCONTINUED | OUTPATIENT
Start: 2025-02-28 | End: 2025-02-27

## 2025-02-27 RX ORDER — PREDNISONE 20 MG/1
40 TABLET ORAL DAILY
Status: DISCONTINUED | OUTPATIENT
Start: 2025-02-28 | End: 2025-02-28 | Stop reason: HOSPADM

## 2025-02-27 RX ADMIN — LEVALBUTEROL HYDROCHLORIDE 1.25 MG: 1.25 SOLUTION RESPIRATORY (INHALATION) at 20:42

## 2025-02-27 RX ADMIN — IPRATROPIUM BROMIDE AND ALBUTEROL SULFATE 1 DOSE: .5; 3 SOLUTION RESPIRATORY (INHALATION) at 07:47

## 2025-02-27 RX ADMIN — ENOXAPARIN SODIUM 30 MG: 100 INJECTION SUBCUTANEOUS at 08:51

## 2025-02-27 RX ADMIN — ESCITALOPRAM OXALATE 10 MG: 10 TABLET ORAL at 08:51

## 2025-02-27 RX ADMIN — AMITRIPTYLINE HYDROCHLORIDE 25 MG: 25 TABLET ORAL at 21:21

## 2025-02-27 RX ADMIN — ARFORMOTEROL TARTRATE 15 MCG: 15 SOLUTION RESPIRATORY (INHALATION) at 20:42

## 2025-02-27 RX ADMIN — WATER 40 MG: 1 INJECTION INTRAMUSCULAR; INTRAVENOUS; SUBCUTANEOUS at 05:01

## 2025-02-27 RX ADMIN — BUDESONIDE 500 MCG: 0.5 INHALANT RESPIRATORY (INHALATION) at 20:42

## 2025-02-27 RX ADMIN — LEVALBUTEROL HYDROCHLORIDE 1.25 MG: 1.25 SOLUTION RESPIRATORY (INHALATION) at 03:28

## 2025-02-27 RX ADMIN — ARFORMOTEROL TARTRATE 15 MCG: 15 SOLUTION RESPIRATORY (INHALATION) at 07:47

## 2025-02-27 RX ADMIN — BUDESONIDE 500 MCG: 0.5 INHALANT RESPIRATORY (INHALATION) at 07:47

## 2025-02-27 RX ADMIN — ALPRAZOLAM 0.5 MG: 0.5 TABLET ORAL at 21:21

## 2025-02-27 RX ADMIN — ALPRAZOLAM 0.5 MG: 0.5 TABLET ORAL at 08:51

## 2025-02-27 ASSESSMENT — PAIN SCALES - GENERAL: PAINLEVEL_OUTOF10: 0

## 2025-02-27 NOTE — PROGRESS NOTES
Appreciate Care assumed by the hospitalist iCU team , will follow from distance , will reassume the care when off ICU level of care

## 2025-02-27 NOTE — CARE COORDINATION
Discharge Planning Note:    Chart reviewed and it appears that patient has minimal needs for discharge at this time. Risk Score 11 %     Primary Care Physician is Haim Malagon    Primary insurance is [employer]    Please notify case management if any discharge needs are identified.      Case management will continue to follow progress and update discharge plan as needed.

## 2025-02-27 NOTE — PLAN OF CARE
Problem: Pain  Goal: Verbalizes/displays adequate comfort level or baseline comfort level  Outcome: Progressing     Problem: Discharge Planning  Goal: Discharge to home or other facility with appropriate resources  Outcome: Progressing     Problem: Safety - Adult  Goal: Free from fall injury  Outcome: Progressing     Problem: Chronic Conditions and Co-morbidities  Goal: Patient's chronic conditions and co-morbidity symptoms are monitored and maintained or improved  Outcome: Progressing     Problem: Skin/Tissue Integrity  Goal: Skin integrity remains intact  Description: 1.  Monitor for areas of redness and/or skin breakdown  2.  Assess vascular access sites hourly  3.  Every 4-6 hours minimum:  Change oxygen saturation probe site  4.  Every 4-6 hours:  If on nasal continuous positive airway pressure, respiratory therapy assess nares and determine need for appliance change or resting period  Outcome: Progressing

## 2025-02-27 NOTE — PROGRESS NOTES
COMPLEX ROUNDS CHECKLIST    C Comfort - Pain Management/Sedation [] Propofol ( )  [] Fentanyl ( )  [] Precedex (Dose (mg/hr) Dexmedetomidine: 0 mg/hr)  [] Versed ( )  [x] PRN xanax  [] N/A  Santos Agitation Sedation Scale (RASS): Alert & Calm-Spontaneously pays attention to caregiver   O Organisms   *Active isolation*   [x] None   M Mechanical Ventilation/Oxygenation [x]O2 Device: Nasal cannula       O2 Flow Rate (L/min): 3 L/min (baseline)   FiO2 : 30 %      B: Both Spontaneous Awakening and Breathing Trials  Pulse: 90  SpO2: 100 %      P Prophylaxis [] ABX no  [] GI   [x] VTE    [] Bowel regimen (no )   L Lines/Labs [x] Central Venous Access: none has an indwelling okeefe catheter : day 2     [] Routine Labs ordered for next day   E Enteral/Parenteral Nutrition   [] TPN   [x] PO Diet  [] NPO  [] Advance as tolerated   [] Awaiting/Following recommendation from SLP   X X-ray (Do we need one tomorrow?)   [] CXR   [] KUB  [x] No   C  A  R  E Care Team comments/ Family Concerns Team Members Present During Rounds: Primary RN, Charge RN, Critical Care Provider, Hospitalist, Pharmacist, Dietician, Respiratory Therapist, and Case Management    Daily Goals/Plan of the day  Can be downgraded to a remote [facility] level of care     This note is completed during interdisciplinary rounds in collaboration with the provider. Please see the critical care and/or hospitalist note for additional clarification.

## 2025-02-27 NOTE — PROGRESS NOTES
PULMONARY AND CRITICAL CARE MEDICINE PROGRESS NOTE    Subjective: Patient lying in bed in no apparent respiratory distress.  Reports feeling significant improvement in her breathing as well as her anxiety.  Currently on 3 L/min of oxygen supplementation saturating in high 90s.  Recovering from a COPD exacerbation precipitated by influenza A infection.    REVIEW OF SYSTEMS:     8 review of system is negative except that mentioned in the subjective portion.    PAST MEDICAL HISTORY:   Past Medical History:   Diagnosis Date    COPD (chronic obstructive pulmonary disease) (HCC)        PAST SURGICAL HISTORY:   Past Surgical History:   Procedure Laterality Date     SECTION      TUBAL LIGATION         SOCIAL HISTORY:   Social History     Tobacco Use    Smoking status: Former     Current packs/day: 0.00     Average packs/day: 0.8 packs/day for 43.0 years (32.3 ttl pk-yrs)     Types: Cigarettes     Start date: 1980     Quit date: 2023     Years since quittin.1    Smokeless tobacco: Never    Tobacco comments:     quit 8/3/2021   Vaping Use    Vaping status: Never Used   Substance Use Topics    Alcohol use: No    Drug use: No       FAMILY HISTORY:   Family History   Problem Relation Age of Onset    Stroke Mother     Cancer Father     Heart Disease Sister     Heart Disease Brother        MEDICATIONS:     No current facility-administered medications on file prior to encounter.     Current Outpatient Medications on File Prior to Encounter   Medication Sig Dispense Refill    albuterol sulfate HFA (PROVENTIL;VENTOLIN;PROAIR) 108 (90 Base) MCG/ACT inhaler INHALE 2 PUFFS INTO THE LUNGS EVERY 6 HOURS AS NEEDED FOR WHEEZING 54 g 3    fluticasone-salmeterol (ADVAIR HFA) 230-21 MCG/ACT inhaler Inhale 2 puffs into the lungs 2 times daily 3 each 3    SPIRIVA RESPIMAT 2.5 MCG/ACT AERS inhaler INHALE 2 PUFFS INTO THE LUNGS DAILY 4 g 5    amitriptyline (ELAVIL) 25 MG tablet Take 1 tablet by mouth nightly       Right basilar airspace disease either due to atelectasis or developing pneumonia.        CULTURES:      Results       Procedure Component Value Units Date/Time    Respiratory Panel, Molecular, with COVID-19 (Restricted: peds pts or suitable admitted adults) [[id number]]  (Abnormal) Collected: 02/26/25 1100    Order Status: Completed Specimen: Nasopharyngeal Updated: 02/26/25 2334     Organism Influenza A H1-2009 by PCR     Respiratory Panel PCR --     POSITIVE FOR  See additional report for complete Respiratory Pathogen Panel      Narrative:      ORDER#: [id number]                          ORDERED BY: TYE SANZ  SOURCE: Nasopharyngeal                     COLLECTED:  02/26/25 11:00  ANTIBIOTICS AT [facility].:                      RECEIVED :  02/26/25 20:53    Respiratory Panel Film Array Report [[id number]] Collected: 02/26/25 1100    Order Status: Completed Updated: 02/26/25 2336     Report SEE IMAGE    COVID-19 & Influenza Combo [[id number]]  (Abnormal) Collected: 02/25/25 0836    Order Status: Completed Specimen: Nasopharyngeal Swab Updated: 02/25/25 0906     SARS-CoV-2 RNA, RT PCR NOT DETECTED     Comment: Not Detected results do not preclude SARS-CoV-2 infection and  should not be used as the sole basis for patient management  decisions.  Results must be combined with clinical observations,  patient history, and epidemiological information.  Testing was performed using VIANEY ELISSA SARS-CoV-2 and Influenza A/B  nucleic acid assay. This test is a multiplex Real-Time Reverse  Transcriptase Polymerase Chain Reaction (RT-PCR)-based in vitro  diagnostic test intended for the qualitative detection of nucleic  acids from SARS-CoV-2, influenza A, and influenza B in nasopharyngeal  and nasal swab specimens for use under the FDA’s Emergency Use  Authorization (EUA) only.    Patient Fact Sheet:  https://www.fda.gov/media/084208/download  Provider Fact Sheet: https://www.fda.gov/media/608398/download  EUA:

## 2025-02-27 NOTE — PROGRESS NOTES
Nutrition Note    RECOMMENDATIONS  PO Diet: Continue current diet  ONS: Ordered Magic Cup and Gelatein each once daily for trial    ASSESSMENT   Pt triggered r/t positive nutrition screen. Upon visiting, reported decreased appetite and po intake for a week or so pta with unsure amount of unintentional wt loss or UBW. Wt hx in EMR indicates 46 lb (30%) wt loss since May 2023, 16 lb (13%) wt loss since September 2024; which is considered significant. Drinks Boost at home, does not like Ensure but willing to try Magic Cup or Gelatein. RD will monitor for ONS acceptance and adequate po intake.     Malnutrition Status: Severe malnutrition  Chronic Illness  Findings of the 6 clinical characteristics of malnutrition:  Energy Intake:  75% or less estimated energy requirements for 1 month or longer (as evidenced by wt loss)  Weight Loss:  Greater than 10% over 6 months (46 lb (30%) wt loss in 21 months, 16 lb (13%) wt loss in 5 months)     Body Fat Loss:  Unable to assess (covered to shoulders in blankets)     Muscle Mass Loss:  Unable to assess    Fluid Accumulation:  No fluid accumulation     Strength:  Not Performed    NUTRITION DIAGNOSIS   Severe malnutrition, in context of chronic illness related to catabolic illness (COPD) as evidenced by criteria as identified in malnutrition assessment    Goals: PO intake 50% or greater, by next RD assessment     NUTRITION RELATED FINDINGS  Objective: No BM documented.  Wounds: None    CURRENT NUTRITION THERAPIES  ADULT DIET; Regular       PO Intake: 0%, 1-25%   PO Supplement Intake:None Ordered    ANTHROPOMETRICS  Current Height: 160 cm (5' 3\")  Current Weight - Scale: 47.2 kg (104 lb 0.9 oz)    Admission weight: 49 kg (108 lb 0.4 oz)    COMPARATIVE STANDARDS  Total Energy Requirements (kcals/day): 2711-7952     Protein (g):  71-94       Fluid (mL/day):  1645    EDUCATION  Education/Counseling not indicated     The patient will be monitored per nutrition standards of care.

## 2025-02-27 NOTE — PROGRESS NOTES
Patient has been stable today. Some anxiety related to breathing but able to calm patient. On baseline 3LNC. Has been able to ambulate to bed and chair.

## 2025-02-27 NOTE — PROGRESS NOTES
Hospitalist Progress Note    Name:  Neena Mejia    /Age/Sex: 1960  (64 y.o. female)  MRN & CSN:  [id number] & [id number]    PCP: Haim Malagon DO    Date of Admission: 2025    Patient Status:  Inpatient     Chief Complaint:   Chief Complaint   Patient presents with    Shortness of Breath     Pt brought in [facility] from home pt reports sudden onset of sob hx of copd recent admission to [facility] for the flu and pneumonia was discharged on .  Denies chest pain.        Hospital Course:   Patient admitted for shortness of breath.  Recently treated for influenza A and completed Tamiflu course.  On admission, tested positive again for influenza A.  Imaging indicative of possible pneumonia.  Patient started on antibiotics.    Patient was on the medical floor when she started to have worsening hypoxia and anxiety.  Patient transferred to ICU and started on Precedex drip and BiPAP.  CCM consulted.    Subjective:  Today is:  Hospital Day: 3.  Patient seen and examined in ICU-3903/3903-01.     In bed. Less anxious today. Breathing is better. On home O2 dose.      Medications:  Reviewed    Infusion Medications    dextrose       Scheduled Medications    [START ON 2025] predniSONE  40 mg Oral Daily    Followed by    [START ON 3/2/2025] predniSONE  20 mg Oral Daily    Followed by    [START ON 3/4/2025] predniSONE  10 mg Oral Daily    Followed by    [START ON 3/6/2025] predniSONE  5 mg Oral Daily    Followed by    [START ON 3/8/2025] predniSONE  1 mg Oral Daily    enoxaparin  30 mg SubCUTAneous Daily    arformoterol tartrate  15 mcg Nebulization BID RT    budesonide  0.5 mg Nebulization BID RT    ipratropium 0.5 mg-albuterol 2.5 mg  1 Dose Inhalation Q6H RT    ALPRAZolam  0.5 mg Oral BID    insulin lispro  0-4 Units SubCUTAneous 4x Daily AC & HS    amitriptyline  25 mg Oral Nightly    escitalopram  10 mg Oral Daily     PRN Meds: sodium phosphate 15 mmol in sodium chloride 0.9  % 250 mL IVPB, magnesium sulfate, potassium chloride **OR** potassium chloride, sulfur hexafluoride microspheres, [Held by provider] albuterol sulfate HFA, dextrose bolus **OR** dextrose bolus, glucagon (rDNA), dextrose, levalbuterol      Intake/Output Summary (Last 24 hours) at 2/27/2025 0857  Last data filed at 2/27/2025 0541  Gross per 24 hour   Intake 372.11 ml   Output 550 ml   Net -177.89 ml       Physical Exam Performed:    BP (!) 119/92   Pulse 90   Temp 97.6 °F (36.4 °C) (Temporal)   Resp 16   Ht 1.6 m (5' 3\")   Wt 47.2 kg (104 lb 0.9 oz)   SpO2 100%   BMI 18.43 kg/m²     General appearance: Mildly anxious, appears stated age and cooperative.   HEENT: Pupils equal, round, and reactive to light. Conjunctivae/corneas clear.  NC present.  Neck: Supple, with full range of motion. No jugular venous distention. Trachea midline.  Respiratory:  Normal respiratory effort.  Bilateral expiratory wheezes noted.  Cardiovascular: Regular rate and rhythm with normal S1/S2 without murmurs, rubs or gallops. No peripheral edema.  Abdomen: Soft, non-tender, non-distended with normal bowel sounds.  : No CVA tenderness.  Musculoskeletal: No clubbing or cyanosis.  Full range of motion without deformity.  Skin: Skin color, texture, turgor normal.  No rashes or lesions.  Neurologic:  Neurovascularly intact without any focal sensory/motor deficits. Cranial nerves: II-XII intact, grossly non-focal.  Psychiatric: Alert and oriented, thought content appropriate, normal insight  Capillary Refill: Brisk, 3 seconds, normal   Peripheral Pulses: +2 palpable, equal bilaterally       Labs:   Recent Labs     02/25/25  0836 02/27/25  0449   WBC 12.8* 11.7*   HGB 14.9 13.2   HCT 45.2 40.3    334     Recent Labs     02/25/25  0836 02/27/25  0449    140   K 4.3 4.2   CL 92* 98*   CO2 35* 31   BUN 23* 22*   CREATININE 0.6 0.5*   CALCIUM 10.0 9.7   PHOS  --  3.5     Recent Labs     02/25/25  0836   AST 54*   ALT 44*   BILITOT

## 2025-02-27 NOTE — PROGRESS NOTES
[facility] - Inpatient Rehabilitation Department   Phone: [phone]    Occupational Therapy    [x] Initial Evaluation            [] Daily Treatment Note         [] Discharge Summary      Patient: Neena Mejia   : 1960   MRN: [id number]   Date of Service:  2025    Admitting Diagnosis:  Acute respiratory failure with hypoxia and hypercapnia (HCC)  Current Admission Summary: Per ED note, \"Neena Mejia is a 64 y.o. female who presents for evaluation of shortness of breath that started again this morning.  Patient was recently diagnosed with influenza and pneumonia and was admitted at [facility] for several days.  Discharged home 2 days ago.  States that started feeling short of breath again this morning.  EMS states that she was tripoding upon arrival, hypoxic and placed on nonrebreather.  No additional information is able to be obtained at this time. \"  Past Medical History:  has a past medical history of COPD (chronic obstructive pulmonary disease) (HCC).  Past Surgical History:  has a past surgical history that includes Tubal ligation and  section.    Discharge Recommendations: Neena Mejia scored a  on the AM-PAC ADL Inpatient form. Current research shows that an AM-PAC score of 17 or less is typically not associated with a discharge to the patient's home setting. Based on the patient's AM-PAC score and their current ADL deficits, it is recommended that the patient have 3-5 sessions per week of Occupational Therapy at d/c to increase the patient's independence.  Please see assessment section for further patient specific details.    If patient discharges prior to next session this note will serve as a discharge summary.  Please see below for the latest assessment towards goals.      DME Required For Discharge:     Precautions/Restrictions: high fall risk, Isolation precautions  Weight Bearing Restrictions: no restrictions  [] Right Upper Extremity  [] Left Upper  awareness of deficits  Sequencing: requires cues for some  Comment:Pt with extreme anxiety regarding breathing/catching her breath. Very limited with activity she will agree to.  Orientation:    alert and oriented x 4  Command Following:   WFL     Education  Barriers To Learning: cognition and emotional  Patient Education: patient educated on OT role and benefits, plan of care, transfer training, discharge recommendations  Learning Assessment:  patient verbalizes understanding, would benefit from continued reinforcement    Assessment  Activity Tolerance: Pt very limited by SOB and extreme anxiety.  Impairments Requiring Therapeutic Intervention: decreased functional mobility, decreased ADL status, decreased endurance  Prognosis: good  Clinical Assessment: Pt is below her baseline level of occupational function, based on the above deficits associated with acute respiratory failure. At baseline, pt is independent with ADLs and functional transfers/mobility. Today, she is very limited by anxiety, and only agreeable to limited activities. Mod A supine to sit, CGA for functional transfers with RW, D of LB dressing. CGA for toileting.  Skilled OT services needed to address deficits and facilitate safe return to PLOF.     Safety Interventions: patient left in chair, chair alarm in place, call light within reach, gait belt, and nurse notified    Plan  Frequency: 3-5 x/per week  Current Treatment Recommendations: functional mobility training, transfer training, endurance training, and ADL/self-care training    Goals  Patient Goals: To return home   Short Term Goals:  Time Frame: Discharge  Patient will complete upper body ADL at set up assistance   Patient will complete lower body ADL at stand by assistance   Patient will complete toileting at stand by assistance   Patient will complete grooming at stand by assistance, in stance at sink   Patient will complete functional transfers at stand by assistance, with RW   Patient

## 2025-02-27 NOTE — PROGRESS NOTES
[facility] - Inpatient Rehabilitation Department   Phone: [phone]    Physical Therapy    [x] Initial Evaluation            [] Daily Treatment Note         [] Discharge Summary      Patient: Neena Mejia   : 1960   MRN: [id number]   Date of Service:  2025  Admitting Diagnosis: Acute respiratory failure with hypoxia and hypercapnia (HCC)  Current Admission Summary: Neena Mejia is a 64 y.o. female who presents for evaluation of shortness of breath that started again this morning.  Patient was recently diagnosed with influenza and pneumonia and was admitted at [facility] for several days.  Discharged home 2 days ago.  States that started feeling short of breath again this morning.  EMS states that she was tripoding upon arrival, hypoxic and placed on nonrebreather.  No additional information is able to be obtained at this time.   Past Medical History:  has a past medical history of COPD (chronic obstructive pulmonary disease) ([facility]).  Past Surgical History:  has a past surgical history that includes Tubal ligation and  section.  Discharge Recommendations: Neena Mejia scored a 16/24 on the AM-PAC short mobility form. Current research shows that an AM-PAC score of 17 or less is typically not associated with a discharge to the patient's home setting. Based on the patient's AM-PAC score and their current functional mobility deficits, it is recommended that the patient have 3-5 sessions per week of Physical Therapy at d/c to increase the patient's independence.  Please see assessment section for further patient specific details.  IF pt refused SNF recommendations, recommend HHPT HOME HEALTH CARE: LEVEL 1 STANDARD    - Initial home health evaluation to occur within 24-48 hours, in patient home   - Therapy to evaluate with goal of regaining prior level of functioning   - Therapy to evaluate if patient has Home Health Aide needs for personal care   If patient discharges prior  to next session this note will serve as a discharge summary.  Please see below for the latest assessment towards goals.    DME Required For Discharge: patient has all required DME for discharge  Precautions/Restrictions: high fall risk, Isolation precautions  Weight Bearing Restrictions: no restrictions  [] Right Upper Extremity  [] Left Upper Extremity [] Right Lower Extremity  [] Left Lower Extremity     Required Braces/Orthotics: no braces required   [] Right  [] Left  Positional Restrictions:no positional restrictions    Pre-Admission Information   Lives With: significant other -fiance   Type of Home: hotel  Home Layout: one level  Home Access: level entry  Bathroom Layout: tub/shower unit  Bathroom Equipment: grab bars in shower, shower chair, 3-in-1 commode  Toilet Height: standard height  Home Equipment: rollator - 4 wheeled walker, oxygen  Transfer Assistance: modified independent with use of 4ww  Ambulation Assistance:modified independent with use of 4ww  ADL Assistance: independent with all ADL's  IADL Assistance: independent with homemaking tasks - shares with boyfriend  Active :        [] Yes  [x] No  Hand Dominance: [] Left  [x] Right  Current Employment: disabled  Hobbies:   Recent Falls: 1 fall in past 6 mo, about 1.5 weeks ago.     Available Assistance at Discharge: available PRN - boyfriend works during the daytime and daughter in law comes to sit with her during the day.     Examination   Vision:   Vision Gross Assessment: Impaired and Vision Corrective Device: wears glasses at all times  Hearing:   WFL  Observation:   General Observation:  Pt on 3LO2, okeefe catheter, IV  Posture:   Increased kyphosis, forward head and rounded shoulders  Sensation:   WFL  Proprioception:    WFL  Tone:   Normotonic  Coordination Testing:   WFL    ROM:   (B) LE AROM WFL  Strength:   (B) LE strength grossly -4/5  Therapist Clinical Decision Making (Complexity): medium complexity  Clinical Presentation:  training, endurance training, home exercise program, and safety education    Goals  Patient Goals: To DC back to [facility] room   Short Term Goals:  Time Frame: to be met by DC  Patient will complete bed mobility at stand by assistance   Patient will complete transfers at stand by assistance   Patient will ambulate 30 ft with use of LRAD at stand by assistance    Above goals reviewed on 2/27/2025.  All goals are ongoing at this time unless indicated above.      Therapy Session Time      Individual Group Co-treatment   Time In     1106   Time Out     1144   Minutes     38     Timed Code Treatment Minutes:  38 Minutes  Total Treatment Minutes:  23 minutes       Electronically Signed By: ELIZABETH KISER, [id number]

## 2025-02-28 VITALS
DIASTOLIC BLOOD PRESSURE: 93 MMHG | TEMPERATURE: 97.9 F | WEIGHT: 104.06 LBS | HEIGHT: 63 IN | SYSTOLIC BLOOD PRESSURE: 143 MMHG | RESPIRATION RATE: 28 BRPM | HEART RATE: 105 BPM | BODY MASS INDEX: 18.44 KG/M2 | OXYGEN SATURATION: 93 %

## 2025-02-28 PROBLEM — J96.01 ACUTE RESPIRATORY FAILURE WITH HYPOXIA AND HYPERCAPNIA (HCC): Status: RESOLVED | Noted: 2025-02-25 | Resolved: 2025-02-28

## 2025-02-28 PROBLEM — J96.02 ACUTE RESPIRATORY FAILURE WITH HYPOXIA AND HYPERCAPNIA (HCC): Status: RESOLVED | Noted: 2025-02-25 | Resolved: 2025-02-28

## 2025-02-28 LAB
ALBUMIN SERPL-MCNC: 3.5 G/DL (ref 3.4–5)
ANION GAP SERPL CALCULATED.3IONS-SCNC: 13 MMOL/L (ref 3–16)
BASOPHILS # BLD: 0 K/UL (ref 0–0.2)
BASOPHILS NFR BLD: 0 %
BUN SERPL-MCNC: 20 MG/DL (ref 7–20)
CALCIUM SERPL-MCNC: 9.3 MG/DL (ref 8.3–10.6)
CHLORIDE SERPL-SCNC: 98 MMOL/L (ref 99–110)
CO2 SERPL-SCNC: 29 MMOL/L (ref 21–32)
CREAT SERPL-MCNC: 0.5 MG/DL (ref 0.6–1.2)
DEPRECATED RDW RBC AUTO: 13.7 % (ref 12.4–15.4)
EOSINOPHIL # BLD: 0 K/UL (ref 0–0.6)
EOSINOPHIL NFR BLD: 0 %
GFR SERPLBLD CREATININE-BSD FMLA CKD-EPI: >90 ML/MIN/{1.73_M2}
GLUCOSE BLD-MCNC: 202 MG/DL (ref 70–99)
GLUCOSE BLD-MCNC: 72 MG/DL (ref 70–99)
GLUCOSE SERPL-MCNC: 81 MG/DL (ref 70–99)
HCT VFR BLD AUTO: 43.7 % (ref 36–48)
HGB BLD-MCNC: 13.8 G/DL (ref 12–16)
LYMPHOCYTES # BLD: 3.7 K/UL (ref 1–5.1)
LYMPHOCYTES NFR BLD: 31 %
MCH RBC QN AUTO: 28.3 PG (ref 26–34)
MCHC RBC AUTO-ENTMCNC: 31.6 G/DL (ref 31–36)
MCV RBC AUTO: 89.6 FL (ref 80–100)
MONOCYTES # BLD: 1 K/UL (ref 0–1.3)
MONOCYTES NFR BLD: 8 %
NEUTROPHILS # BLD: 7.3 K/UL (ref 1.7–7.7)
NEUTROPHILS NFR BLD: 61 %
NRBC BLD-RTO: 1 /100 WBC
PATH INTERP BLD-IMP: NO
PERFORMED ON: ABNORMAL
PERFORMED ON: NORMAL
PHOSPHATE SERPL-MCNC: 3.8 MG/DL (ref 2.5–4.9)
PLATELET # BLD AUTO: 321 K/UL (ref 135–450)
PLATELET BLD QL SMEAR: ADEQUATE
PMV BLD AUTO: 7.6 FL (ref 5–10.5)
POTASSIUM SERPL-SCNC: 4.2 MMOL/L (ref 3.5–5.1)
RBC # BLD AUTO: 4.88 M/UL (ref 4–5.2)
REASON FOR REJECTION: NORMAL
REJECTED TEST: NORMAL
SLIDE REVIEW: ABNORMAL
SODIUM SERPL-SCNC: 140 MMOL/L (ref 136–145)
WBC # BLD AUTO: 11.9 K/UL (ref 4–11)

## 2025-02-28 PROCEDURE — 94640 AIRWAY INHALATION TREATMENT: CPT

## 2025-02-28 PROCEDURE — 6360000002 HC RX W HCPCS: Performed by: STUDENT IN AN ORGANIZED HEALTH CARE EDUCATION/TRAINING PROGRAM

## 2025-02-28 PROCEDURE — 94761 N-INVAS EAR/PLS OXIMETRY MLT: CPT

## 2025-02-28 PROCEDURE — 6360000002 HC RX W HCPCS: Performed by: INTERNAL MEDICINE

## 2025-02-28 PROCEDURE — 80069 RENAL FUNCTION PANEL: CPT

## 2025-02-28 PROCEDURE — 6370000000 HC RX 637 (ALT 250 FOR IP): Performed by: INTERNAL MEDICINE

## 2025-02-28 PROCEDURE — 36415 COLL VENOUS BLD VENIPUNCTURE: CPT

## 2025-02-28 PROCEDURE — 6370000000 HC RX 637 (ALT 250 FOR IP): Performed by: STUDENT IN AN ORGANIZED HEALTH CARE EDUCATION/TRAINING PROGRAM

## 2025-02-28 PROCEDURE — 85025 COMPLETE CBC W/AUTO DIFF WBC: CPT

## 2025-02-28 PROCEDURE — 51798 US URINE CAPACITY MEASURE: CPT

## 2025-02-28 PROCEDURE — 2700000000 HC OXYGEN THERAPY PER DAY

## 2025-02-28 RX ORDER — PREDNISONE 1 MG/1
TABLET ORAL
Qty: 152 TABLET | Refills: 0 | Status: SHIPPED | OUTPATIENT
Start: 2025-02-28 | End: 2025-03-10

## 2025-02-28 RX ORDER — ALPRAZOLAM 0.5 MG
0.5 TABLET ORAL 2 TIMES DAILY
Qty: 10 TABLET | Refills: 0 | Status: SHIPPED | OUTPATIENT
Start: 2025-02-28 | End: 2025-03-05

## 2025-02-28 RX ADMIN — PREDNISONE 40 MG: 20 TABLET ORAL at 08:56

## 2025-02-28 RX ADMIN — LEVALBUTEROL HYDROCHLORIDE 1.25 MG: 1.25 SOLUTION RESPIRATORY (INHALATION) at 01:33

## 2025-02-28 RX ADMIN — ENOXAPARIN SODIUM 30 MG: 100 INJECTION SUBCUTANEOUS at 08:56

## 2025-02-28 RX ADMIN — ALPRAZOLAM 0.5 MG: 0.5 TABLET ORAL at 08:56

## 2025-02-28 RX ADMIN — LEVALBUTEROL HYDROCHLORIDE 1.25 MG: 1.25 SOLUTION RESPIRATORY (INHALATION) at 11:21

## 2025-02-28 RX ADMIN — ESCITALOPRAM OXALATE 10 MG: 10 TABLET ORAL at 08:56

## 2025-02-28 RX ADMIN — ARFORMOTEROL TARTRATE 15 MCG: 15 SOLUTION RESPIRATORY (INHALATION) at 08:18

## 2025-02-28 RX ADMIN — BUDESONIDE 500 MCG: 0.5 INHALANT RESPIRATORY (INHALATION) at 08:19

## 2025-02-28 RX ADMIN — IPRATROPIUM BROMIDE AND ALBUTEROL SULFATE 1 DOSE: .5; 3 SOLUTION RESPIRATORY (INHALATION) at 08:30

## 2025-02-28 NOTE — DISCHARGE SUMMARY
Hospital Medicine Discharge Summary    Name:  Neena Mejia  Gender: female  : 1960  64 y.o.  MRN: [id number]    PCP: Haim Malagon DO     Date of Admission:  2025  8:18 AM  Discharge Date: 2025    Admitting Physician: Jessica Aguila MD  Discharge Physician: Steven Kearney DO    Communication to PCP  -prednisone taper  -discharged on xanax      Discharge Diagnoses:       Active Hospital Problems    Diagnosis     Severe malnutrition [E43]     COPD exacerbation (HCC) [J44.1]     Pneumonia involving left lung [J18.9]     HTN (hypertension) [I10]     Controlled type 2 diabetes mellitus, with long-term current use of insulin (HCC) [E11.9, Z79.4]     Atrial tachycardia [I47.19]        The patient was seen and examined on day of discharge and this discharge summary is in conjunction with any daily progress note from day of discharge.    Hospital Course:  Neena Mejia is a 64 y.o. year old female who presented to [facility] on 2025  8:18 AM.      Patient admitted for shortness of breath.  Recently treated for influenza A and completed Tamiflu course.  On admission, tested positive again for influenza A.  Imaging indicative of possible pneumonia.  Patient started on antibiotics.     Patient was on the medical floor when she started to have worsening hypoxia and anxiety.  Patient transferred to ICU and started on Precedex drip and BiPAP.  CCM consulted.    Eventually weaned off precedex and no longer needed BiPAP. Was more calm while on xanax.      On the last day of hospital stay, patient was doing well. No pain. Breathing much better on home O2 of 3L.  The patient expressed appropriate understanding of and agreement with the discharge recommendations, medications, and plan.      Physical Exam Performed:     /85   Pulse 75   Temp 98.5 °F (36.9 °C) (Temporal)   Resp 20   Ht 1.6 m (5' 3\")   Wt 47.2 kg (104 lb 0.9 oz)   SpO2 100%   BMI 18.43 kg/m²       General  DO   2/28/2025  8:49 AM

## 2025-02-28 NOTE — PROGRESS NOTES
CLINICAL PHARMACY NOTE: MEDS TO BEDS    Total # of Prescriptions Filled: 3   The following medications were delivered to the patient:  Prednisone 5mg (patient made aware of MG change due to stock)  Prednisone 1mg (patient made aware of MG change due to stock)  Alprazolam 0.5mg    Additional Documentation:     JENNI Colón approisidoro medication delivery    Medications were delivered to patient's room and patient signed for    Sindy Mtz CPhT

## 2025-02-28 NOTE — DISCHARGE INSTRUCTIONS
Influenza (Flu): Care Instructions  Overview     Influenza (flu) is an infection in the lungs and breathing passages. It is caused by the influenza virus. There are different strains, or types, of the flu virus from year to year. Unlike the common cold, the flu comes on suddenly and the symptoms can be more severe. These symptoms include a cough, congestion, fever, chills, fatigue, aches, and pains. These symptoms may last for a few weeks. Although the flu can make you feel very sick, it usually doesn't cause serious health problems.  Home treatment is usually all you need for flu symptoms. But your doctor may prescribe antiviral medicine to prevent other health problems, such as pneumonia, from developing. The risk of other health problems from the flu is highest for young children (under 2), older adults (over 65), pregnant women, and people with long-term health conditions.  Follow-up care is a key part of your treatment and safety. Be sure to make and go to all appointments, and call your doctor if you are having problems. It's also a good idea to know your test results and keep a list of the medicines you take.  How can you care for yourself at home?  Get plenty of rest.  Drink plenty of fluids. If you have to limit fluids because of a health problem, talk with your doctor before you increase the amount of fluids you drink.  Take an over-the-counter pain medicine if needed, such as acetaminophen (Tylenol), ibuprofen (Advil, Motrin), or naproxen (Aleve), to relieve fever, headache, and muscle aches. Be safe with medicines. Read and follow all instructions on the label.  No one younger than 20 should take aspirin. It has been linked to Reye syndrome, a serious illness.  Take any prescribed medicine exactly as directed.  Do not smoke. Smoking can make the flu worse. If you need help quitting, talk to your doctor about stop-smoking programs and medicines. These can increase your chances of quitting for  https://www.TIKI.VN.net/patientEd and enter L652 to learn more about \"Influenza (Flu): Care Instructions.\"  Current as of: April 30, 2024  Content Version: 14.3  © 2024 [employer].   Care instructions adapted under license by [employer]. If you have questions about a medical condition or this instruction, always ask your healthcare professional. [employer] disclaims any warranty or liability for your use of this information.

## 2025-02-28 NOTE — CARE COORDINATION
Case Management Assessment  Initial Evaluation    Date/Time of Evaluation: 2/28/2025 8:36 AM  Assessment Completed by: Mahnaz Jaramillo RN    If patient is discharged prior to next notation, then this note serves as note for discharge by case management.    Patient Name: Neena Mejia                   YOB: 1960  Diagnosis: Acute respiratory failure (HCC) [J96.00]  Influenza A [J10.1]  Elevated troponin [R79.89]  COPD exacerbation (HCC) [J44.1]  Acute respiratory failure with hypoxia (HCC) [J96.01]  Pneumonia due to infectious organism, unspecified laterality, unspecified part of lung [J18.9]                   Date / Time: 2/25/2025  8:18 AM    Patient Admission Status: Inpatient   Readmission Risk (Low < 19, Mod (19-27), High > 27): Readmission Risk Score: 10.8    Current PCP: Haim Malagon, DO  PCP verified by CM? Yes    Chart Reviewed: Yes      History Provided by: Patient  Patient Orientation: Alert and Oriented    Patient Cognition: Alert    Hospitalization in the last 30 days (Readmission):  No    If yes, Readmission Assessment in CM Navigator will be completed.    Advance Directives:      Code Status: Full Code   Patient's Primary Decision Maker is: Legal Next of Kin      Discharge Planning:    Patient lives with: Spouse/Significant Other Type of Home: Other (Comment) (Lives in a hotel right now while her and her significant other are buying a house.)  Primary Care Giver: Self  Patient Support Systems include: Spouse/Significant Other, Children, Family Members   Current Financial resources: [employer] [place]  Current community resources: None  Current services prior to admission: Oxygen Therapy, Durable Medical Equipment ([employer] 3L baseline)            Current DME: Walker, Wheelchair, Shower Chair            Type of Home Care services:  None    ADLS  Prior functional level: Independent in ADLs/IADLs (significant other helps with jaylen things that can't do.)  Current functional level:      The Plan for Transition of Care is related to the following treatment goals of Acute respiratory failure (HCC) [J96.00]  Influenza A [J10.1]  Elevated troponin [R79.89]  COPD exacerbation (HCC) [J44.1]  Acute respiratory failure with hypoxia (HCC) [J96.01]  Pneumonia due to infectious organism, unspecified laterality, unspecified part of lung [J18.9]    IF APPLICABLE: The Patient and/or patient representative Neena and her family were provided with a choice of provider and agrees with the discharge plan. Freedom of choice list with basic dialogue that supports the patient's individualized plan of care/goals and shares the quality data associated with the providers was provided to: Patient   Patient Representative Name:       The Patient and/or Patient Representative Agree with the Discharge Plan? Yes    Mahnaz Jaramillo RN  Case Management Department  Ph: [phone]

## 2025-02-28 NOTE — PROGRESS NOTES
Pt c/o sob anxiety, increase work of breathing. RN call RT placed her back on bi-pap, Notified MD on unit.     MD to bedside, Pt complains inhalation meds call her to feel jittery, Pt agreeable for HHN per RT    Update @ 7171    RN reports to MD via PS, Pt has had no void since okeefe removed, bladder scanned 126 ml of urine.

## 2025-03-03 ENCOUNTER — TELEPHONE (OUTPATIENT)
Dept: PULMONOLOGY | Age: 65
End: 2025-03-03

## 2025-03-03 NOTE — TELEPHONE ENCOUNTER
----- Message from Yelena SINHA sent at 3/3/2025  2:01 PM EST -----  Will you call to schedule hfu appt with Dr Kurtz.  Thanks!!!!!  ----- Message -----  From: Chaz Ochoa MD  Sent: 2/27/2025  11:20 AM EST  To: [facility]  [facility]    This patient will need a hospital follow-up with Dr. Kurtz within 7 to 10 days from now.    Thanks    AG

## 2025-03-05 ENCOUNTER — ANESTHESIA (OUTPATIENT)
Dept: ENDOSCOPY | Age: 65
End: 2025-03-05
Payer: MEDICARE

## 2025-03-05 ENCOUNTER — ANESTHESIA EVENT (OUTPATIENT)
Dept: ENDOSCOPY | Age: 65
End: 2025-03-05
Payer: MEDICARE

## 2025-03-05 ENCOUNTER — APPOINTMENT (OUTPATIENT)
Dept: GENERAL RADIOLOGY | Age: 65
DRG: 190 | End: 2025-03-05
Payer: MEDICARE

## 2025-03-05 ENCOUNTER — HOSPITAL ENCOUNTER (INPATIENT)
Age: 65
LOS: 3 days | Discharge: HOME HEALTH | DRG: 190 | End: 2025-03-08
Attending: EMERGENCY MEDICINE | Admitting: INTERNAL MEDICINE
Payer: MEDICARE

## 2025-03-05 ENCOUNTER — APPOINTMENT (OUTPATIENT)
Dept: CT IMAGING | Age: 65
DRG: 190 | End: 2025-03-05
Payer: MEDICARE

## 2025-03-05 DIAGNOSIS — D72.810 LYMPHOPENIA: ICD-10-CM

## 2025-03-05 DIAGNOSIS — N93.9 VAGINAL BLEEDING: ICD-10-CM

## 2025-03-05 DIAGNOSIS — J96.02 ACUTE RESPIRATORY FAILURE WITH HYPERCAPNIA (HCC): Primary | ICD-10-CM

## 2025-03-05 DIAGNOSIS — R79.89 ELEVATED TROPONIN: ICD-10-CM

## 2025-03-05 DIAGNOSIS — J44.1 COPD EXACERBATION (HCC): ICD-10-CM

## 2025-03-05 DIAGNOSIS — D72.829 LEUKOCYTOSIS, UNSPECIFIED TYPE: ICD-10-CM

## 2025-03-05 PROBLEM — J96.21 ACUTE ON CHRONIC RESPIRATORY FAILURE WITH HYPOXIA AND HYPERCAPNIA (HCC): Status: ACTIVE | Noted: 2025-03-05

## 2025-03-05 PROBLEM — Z87.891 PERSONAL HISTORY OF TOBACCO USE: Status: ACTIVE | Noted: 2025-03-05

## 2025-03-05 PROBLEM — J96.22 ACUTE ON CHRONIC RESPIRATORY FAILURE WITH HYPOXIA AND HYPERCAPNIA: Status: ACTIVE | Noted: 2025-03-05

## 2025-03-05 LAB
ALBUMIN SERPL-MCNC: 4.2 G/DL (ref 3.4–5)
ALBUMIN/GLOB SERPL: 1.4 {RATIO} (ref 1.1–2.2)
ALP SERPL-CCNC: 54 U/L (ref 40–129)
ALT SERPL-CCNC: 21 U/L (ref 10–40)
ANION GAP SERPL CALCULATED.3IONS-SCNC: 8 MMOL/L (ref 3–16)
APPEARANCE BRONCH: ABNORMAL
AST SERPL-CCNC: 26 U/L (ref 15–37)
BASE EXCESS BLDV CALC-SCNC: 16.9 MMOL/L (ref -3–3)
BASE EXCESS BLDV CALC-SCNC: 17.5 MMOL/L (ref -3–3)
BASOPHILS # BLD: 0 K/UL (ref 0–0.2)
BASOPHILS NFR BLD: 0.1 %
BILIRUB SERPL-MCNC: 0.5 MG/DL (ref 0–1)
BUN SERPL-MCNC: 14 MG/DL (ref 7–20)
CALCIUM SERPL-MCNC: 9.6 MG/DL (ref 8.3–10.6)
CHLORIDE SERPL-SCNC: 91 MMOL/L (ref 99–110)
CLOT SPEC QL: ABNORMAL
CO2 BLDV-SCNC: 103 MMOL/L
CO2 BLDV-SCNC: 110 MMOL/L
CO2 SERPL-SCNC: 41 MMOL/L (ref 21–32)
COHGB MFR BLDV: 2.9 % (ref 0–1.5)
COHGB MFR BLDV: 3.5 % (ref 0–1.5)
COLOR BRONCH: COLORLESS
CREAT SERPL-MCNC: 0.4 MG/DL (ref 0.6–1.2)
DEPRECATED RDW RBC AUTO: 13.6 % (ref 12.4–15.4)
DO-HGB MFR BLDV: 8 %
EKG ATRIAL RATE: 105 BPM
EKG DIAGNOSIS: NORMAL
EKG P AXIS: 81 DEGREES
EKG P-R INTERVAL: 134 MS
EKG Q-T INTERVAL: 350 MS
EKG QRS DURATION: 74 MS
EKG QTC CALCULATION (BAZETT): 462 MS
EKG R AXIS: -9 DEGREES
EKG T AXIS: 73 DEGREES
EKG VENTRICULAR RATE: 105 BPM
EOSINOPHIL # BLD: 0 K/UL (ref 0–0.6)
EOSINOPHIL NFR BLD: 0.2 %
GFR SERPLBLD CREATININE-BSD FMLA CKD-EPI: >90 ML/MIN/{1.73_M2}
GLUCOSE SERPL-MCNC: 153 MG/DL (ref 70–99)
HCO3 BLDV-SCNC: 44.2 MMOL/L (ref 23–29)
HCO3 BLDV-SCNC: 46.8 MMOL/L (ref 23–29)
HCT VFR BLD AUTO: 42 % (ref 36–48)
HGB BLD-MCNC: 13.7 G/DL (ref 12–16)
LACTATE BLDV-SCNC: 1.9 MMOL/L (ref 0.4–1.9)
LYMPHOCYTES # BLD: 0.8 K/UL (ref 1–5.1)
LYMPHOCYTES NFR BLD: 6 %
LYMPHOCYTES NFR BRONCH MANUAL: 2 % (ref 5–10)
MACROPHAGES NFR BRONCH MANUAL: 9 % (ref 90–95)
MCH RBC QN AUTO: 28.8 PG (ref 26–34)
MCHC RBC AUTO-ENTMCNC: 32.7 G/DL (ref 31–36)
MCV RBC AUTO: 88 FL (ref 80–100)
METHGB MFR BLDV: 0.2 %
METHGB MFR BLDV: 0.8 %
MONOCYTES # BLD: 0.3 K/UL (ref 0–1.3)
MONOCYTES NFR BLD: 2.2 %
NEUTROPHILS # BLD: 11.6 K/UL (ref 1.7–7.7)
NEUTROPHILS NFR BLD: 91.5 %
NEUTROPHILS NFR BRONCH MANUAL: 89 % (ref 5–10)
NT-PROBNP SERPL-MCNC: 1912 PG/ML (ref 0–124)
NUC CELL # BRONCH MANUAL: 1088 /CUMM
O2 CT VFR BLDV CALC: 18 VOL %
O2 CT VFR BLDV CALC: 18 VOL %
O2 THERAPY: ABNORMAL
O2 THERAPY: ABNORMAL
PCO2 BLDV: 60.5 MMHG (ref 40–50)
PCO2 BLDV: 80.8 MMHG (ref 40–50)
PH BLDV: 7.37 [PH] (ref 7.35–7.45)
PH BLDV: 7.47 [PH] (ref 7.35–7.45)
PLATELET # BLD AUTO: 387 K/UL (ref 135–450)
PMV BLD AUTO: 8 FL (ref 5–10.5)
PO2 BLDV: 168 MMHG (ref 25–40)
PO2 BLDV: 61.9 MMHG (ref 25–40)
POTASSIUM SERPL-SCNC: 4.6 MMOL/L (ref 3.5–5.1)
PROCALCITONIN SERPL IA-MCNC: 0.06 NG/ML (ref 0–0.15)
PROT SERPL-MCNC: 7.3 G/DL (ref 6.4–8.2)
RBC # BLD AUTO: 4.77 M/UL (ref 4–5.2)
RBC # FLD MANUAL: 81 /CUMM
SAO2 % BLDV: 100 %
SAO2 % BLDV: 92 %
SODIUM SERPL-SCNC: 140 MMOL/L (ref 136–145)
TOTAL CELLS COUNTED BRONCH: 100
TROPONIN, HIGH SENSITIVITY: 25 NG/L (ref 0–14)
TROPONIN, HIGH SENSITIVITY: 27 NG/L (ref 0–14)
WBC # BLD AUTO: 12.7 K/UL (ref 4–11)

## 2025-03-05 PROCEDURE — 82803 BLOOD GASES ANY COMBINATION: CPT

## 2025-03-05 PROCEDURE — 5A09357 ASSISTANCE WITH RESPIRATORY VENTILATION, LESS THAN 24 CONSECUTIVE HOURS, CONTINUOUS POSITIVE AIRWAY PRESSURE: ICD-10-PCS | Performed by: INTERNAL MEDICINE

## 2025-03-05 PROCEDURE — 80053 COMPREHEN METABOLIC PANEL: CPT

## 2025-03-05 PROCEDURE — 6360000002 HC RX W HCPCS: Performed by: EMERGENCY MEDICINE

## 2025-03-05 PROCEDURE — 2700000000 HC OXYGEN THERAPY PER DAY

## 2025-03-05 PROCEDURE — 6360000002 HC RX W HCPCS: Performed by: INTERNAL MEDICINE

## 2025-03-05 PROCEDURE — 6370000000 HC RX 637 (ALT 250 FOR IP): Performed by: EMERGENCY MEDICINE

## 2025-03-05 PROCEDURE — 88305 TISSUE EXAM BY PATHOLOGIST: CPT

## 2025-03-05 PROCEDURE — 3609010800 HC BRONCHOSCOPY ALVEOLAR LAVAGE: Performed by: INTERNAL MEDICINE

## 2025-03-05 PROCEDURE — 6370000000 HC RX 637 (ALT 250 FOR IP): Performed by: INTERNAL MEDICINE

## 2025-03-05 PROCEDURE — 3700000001 HC ADD 15 MINUTES (ANESTHESIA): Performed by: INTERNAL MEDICINE

## 2025-03-05 PROCEDURE — 31624 DX BRONCHOSCOPE/LAVAGE: CPT | Performed by: INTERNAL MEDICINE

## 2025-03-05 PROCEDURE — 87070 CULTURE OTHR SPECIMN AEROBIC: CPT

## 2025-03-05 PROCEDURE — 99285 EMERGENCY DEPT VISIT HI MDM: CPT

## 2025-03-05 PROCEDURE — 6360000004 HC RX CONTRAST MEDICATION: Performed by: EMERGENCY MEDICINE

## 2025-03-05 PROCEDURE — 89051 BODY FLUID CELL COUNT: CPT

## 2025-03-05 PROCEDURE — 94660 CPAP INITIATION&MGMT: CPT

## 2025-03-05 PROCEDURE — 6360000002 HC RX W HCPCS: Performed by: NURSE ANESTHETIST, CERTIFIED REGISTERED

## 2025-03-05 PROCEDURE — 88112 CYTOPATH CELL ENHANCE TECH: CPT

## 2025-03-05 PROCEDURE — 0B9F8ZX DRAINAGE OF RIGHT LOWER LUNG LOBE, VIA NATURAL OR ARTIFICIAL OPENING ENDOSCOPIC, DIAGNOSTIC: ICD-10-PCS | Performed by: INTERNAL MEDICINE

## 2025-03-05 PROCEDURE — 0BC38ZZ EXTIRPATION OF MATTER FROM RIGHT MAIN BRONCHUS, VIA NATURAL OR ARTIFICIAL OPENING ENDOSCOPIC: ICD-10-PCS | Performed by: INTERNAL MEDICINE

## 2025-03-05 PROCEDURE — 6370000000 HC RX 637 (ALT 250 FOR IP)

## 2025-03-05 PROCEDURE — 3700000000 HC ANESTHESIA ATTENDED CARE: Performed by: INTERNAL MEDICINE

## 2025-03-05 PROCEDURE — 83605 ASSAY OF LACTIC ACID: CPT

## 2025-03-05 PROCEDURE — 96365 THER/PROPH/DIAG IV INF INIT: CPT

## 2025-03-05 PROCEDURE — 2500000003 HC RX 250 WO HCPCS: Performed by: EMERGENCY MEDICINE

## 2025-03-05 PROCEDURE — 84484 ASSAY OF TROPONIN QUANT: CPT

## 2025-03-05 PROCEDURE — 85025 COMPLETE CBC W/AUTO DIFF WBC: CPT

## 2025-03-05 PROCEDURE — 2500000003 HC RX 250 WO HCPCS: Performed by: INTERNAL MEDICINE

## 2025-03-05 PROCEDURE — 7100000001 HC PACU RECOVERY - ADDTL 15 MIN: Performed by: INTERNAL MEDICINE

## 2025-03-05 PROCEDURE — 94761 N-INVAS EAR/PLS OXIMETRY MLT: CPT

## 2025-03-05 PROCEDURE — 6360000002 HC RX W HCPCS: Performed by: PHYSICIAN ASSISTANT

## 2025-03-05 PROCEDURE — 83880 ASSAY OF NATRIURETIC PEPTIDE: CPT

## 2025-03-05 PROCEDURE — 96375 TX/PRO/DX INJ NEW DRUG ADDON: CPT

## 2025-03-05 PROCEDURE — 84145 PROCALCITONIN (PCT): CPT

## 2025-03-05 PROCEDURE — 94640 AIRWAY INHALATION TREATMENT: CPT

## 2025-03-05 PROCEDURE — 31645 BRNCHSC W/THER ASPIR 1ST: CPT | Performed by: INTERNAL MEDICINE

## 2025-03-05 PROCEDURE — 99291 CRITICAL CARE FIRST HOUR: CPT | Performed by: INTERNAL MEDICINE

## 2025-03-05 PROCEDURE — 87102 FUNGUS ISOLATION CULTURE: CPT

## 2025-03-05 PROCEDURE — 93010 ELECTROCARDIOGRAM REPORT: CPT | Performed by: INTERNAL MEDICINE

## 2025-03-05 PROCEDURE — 3609010900 HC BRONCHOSCOPY THERAPUTIC ASPIRATION INITIAL: Performed by: INTERNAL MEDICINE

## 2025-03-05 PROCEDURE — 7100000000 HC PACU RECOVERY - FIRST 15 MIN: Performed by: INTERNAL MEDICINE

## 2025-03-05 PROCEDURE — 71045 X-RAY EXAM CHEST 1 VIEW: CPT

## 2025-03-05 PROCEDURE — 2500000003 HC RX 250 WO HCPCS: Performed by: NURSE ANESTHETIST, CERTIFIED REGISTERED

## 2025-03-05 PROCEDURE — 2000000000 HC ICU R&B

## 2025-03-05 PROCEDURE — 93005 ELECTROCARDIOGRAM TRACING: CPT | Performed by: EMERGENCY MEDICINE

## 2025-03-05 PROCEDURE — 71260 CT THORAX DX C+: CPT

## 2025-03-05 PROCEDURE — 87205 SMEAR GRAM STAIN: CPT

## 2025-03-05 PROCEDURE — 2709999900 HC NON-CHARGEABLE SUPPLY: Performed by: INTERNAL MEDICINE

## 2025-03-05 RX ORDER — ALBUTEROL SULFATE 0.83 MG/ML
2.5 SOLUTION RESPIRATORY (INHALATION) EVERY 6 HOURS PRN
Status: DISCONTINUED | OUTPATIENT
Start: 2025-03-05 | End: 2025-03-08 | Stop reason: HOSPADM

## 2025-03-05 RX ORDER — BUDESONIDE 0.5 MG/2ML
0.5 INHALANT ORAL
Status: DISCONTINUED | OUTPATIENT
Start: 2025-03-05 | End: 2025-03-08 | Stop reason: HOSPADM

## 2025-03-05 RX ORDER — MAGNESIUM SULFATE IN WATER 40 MG/ML
2000 INJECTION, SOLUTION INTRAVENOUS ONCE
Status: COMPLETED | OUTPATIENT
Start: 2025-03-05 | End: 2025-03-05

## 2025-03-05 RX ORDER — PREDNISONE 20 MG/1
40 TABLET ORAL DAILY
Status: DISCONTINUED | OUTPATIENT
Start: 2025-03-07 | End: 2025-03-05

## 2025-03-05 RX ORDER — ACETAMINOPHEN 650 MG/1
650 SUPPOSITORY RECTAL EVERY 6 HOURS PRN
Status: DISCONTINUED | OUTPATIENT
Start: 2025-03-05 | End: 2025-03-08 | Stop reason: HOSPADM

## 2025-03-05 RX ORDER — ACETAMINOPHEN 325 MG/1
650 TABLET ORAL EVERY 6 HOURS PRN
Status: DISCONTINUED | OUTPATIENT
Start: 2025-03-05 | End: 2025-03-08 | Stop reason: HOSPADM

## 2025-03-05 RX ORDER — ONDANSETRON 2 MG/ML
4 INJECTION INTRAMUSCULAR; INTRAVENOUS EVERY 6 HOURS PRN
Status: DISCONTINUED | OUTPATIENT
Start: 2025-03-05 | End: 2025-03-08 | Stop reason: HOSPADM

## 2025-03-05 RX ORDER — PREDNISONE 20 MG/1
40 TABLET ORAL DAILY
Status: DISCONTINUED | OUTPATIENT
Start: 2025-03-08 | End: 2025-03-08 | Stop reason: HOSPADM

## 2025-03-05 RX ORDER — SODIUM CHLORIDE 0.9 % (FLUSH) 0.9 %
5-40 SYRINGE (ML) INJECTION EVERY 12 HOURS SCHEDULED
Status: DISCONTINUED | OUTPATIENT
Start: 2025-03-05 | End: 2025-03-08 | Stop reason: HOSPADM

## 2025-03-05 RX ORDER — LEVALBUTEROL INHALATION SOLUTION 1.25 MG/3ML
1.25 SOLUTION RESPIRATORY (INHALATION) ONCE
Status: COMPLETED | OUTPATIENT
Start: 2025-03-05 | End: 2025-03-05

## 2025-03-05 RX ORDER — DEXAMETHASONE SODIUM PHOSPHATE 4 MG/ML
INJECTION, SOLUTION INTRA-ARTICULAR; INTRALESIONAL; INTRAMUSCULAR; INTRAVENOUS; SOFT TISSUE
Status: DISCONTINUED | OUTPATIENT
Start: 2025-03-05 | End: 2025-03-05 | Stop reason: SDUPTHER

## 2025-03-05 RX ORDER — IPRATROPIUM BROMIDE AND ALBUTEROL SULFATE 2.5; .5 MG/3ML; MG/3ML
SOLUTION RESPIRATORY (INHALATION)
Status: DISPENSED
Start: 2025-03-05 | End: 2025-03-06

## 2025-03-05 RX ORDER — SODIUM CHLORIDE 0.9 % (FLUSH) 0.9 %
5-40 SYRINGE (ML) INJECTION PRN
Status: DISCONTINUED | OUTPATIENT
Start: 2025-03-05 | End: 2025-03-08 | Stop reason: HOSPADM

## 2025-03-05 RX ORDER — IPRATROPIUM BROMIDE AND ALBUTEROL SULFATE 2.5; .5 MG/3ML; MG/3ML
1 SOLUTION RESPIRATORY (INHALATION)
Status: DISCONTINUED | OUTPATIENT
Start: 2025-03-05 | End: 2025-03-05

## 2025-03-05 RX ORDER — FLUTICASONE FUROATE, UMECLIDINIUM BROMIDE AND VILANTEROL TRIFENATATE 100; 62.5; 25 UG/1; UG/1; UG/1
1 POWDER RESPIRATORY (INHALATION) DAILY
COMMUNITY
End: 2025-03-05 | Stop reason: CLARIF

## 2025-03-05 RX ORDER — SODIUM CHLORIDE 9 MG/ML
INJECTION, SOLUTION INTRAVENOUS PRN
Status: DISCONTINUED | OUTPATIENT
Start: 2025-03-05 | End: 2025-03-08 | Stop reason: HOSPADM

## 2025-03-05 RX ORDER — IPRATROPIUM BROMIDE AND ALBUTEROL SULFATE 2.5; .5 MG/3ML; MG/3ML
1 SOLUTION RESPIRATORY (INHALATION) EVERY 4 HOURS
Status: DISCONTINUED | OUTPATIENT
Start: 2025-03-05 | End: 2025-03-06

## 2025-03-05 RX ORDER — ARFORMOTEROL TARTRATE 15 UG/2ML
15 SOLUTION RESPIRATORY (INHALATION)
Status: DISCONTINUED | OUTPATIENT
Start: 2025-03-05 | End: 2025-03-08 | Stop reason: HOSPADM

## 2025-03-05 RX ORDER — ONDANSETRON 2 MG/ML
INJECTION INTRAMUSCULAR; INTRAVENOUS
Status: DISCONTINUED | OUTPATIENT
Start: 2025-03-05 | End: 2025-03-05 | Stop reason: SDUPTHER

## 2025-03-05 RX ORDER — POLYETHYLENE GLYCOL 3350 17 G/17G
17 POWDER, FOR SOLUTION ORAL DAILY PRN
Status: DISCONTINUED | OUTPATIENT
Start: 2025-03-05 | End: 2025-03-08 | Stop reason: HOSPADM

## 2025-03-05 RX ORDER — ALBUTEROL SULFATE 5 MG/ML
5 SOLUTION RESPIRATORY (INHALATION) ONCE
Status: DISCONTINUED | OUTPATIENT
Start: 2025-03-05 | End: 2025-03-05

## 2025-03-05 RX ORDER — IOPAMIDOL 755 MG/ML
75 INJECTION, SOLUTION INTRAVASCULAR
Status: COMPLETED | OUTPATIENT
Start: 2025-03-05 | End: 2025-03-05

## 2025-03-05 RX ORDER — LIDOCAINE HYDROCHLORIDE 40 MG/ML
SOLUTION TOPICAL PRN
Status: DISCONTINUED | OUTPATIENT
Start: 2025-03-05 | End: 2025-03-05 | Stop reason: ALTCHOICE

## 2025-03-05 RX ORDER — LIDOCAINE HYDROCHLORIDE 20 MG/ML
INJECTION, SOLUTION EPIDURAL; INFILTRATION; INTRACAUDAL; PERINEURAL
Status: DISCONTINUED | OUTPATIENT
Start: 2025-03-05 | End: 2025-03-05 | Stop reason: SDUPTHER

## 2025-03-05 RX ORDER — ESCITALOPRAM OXALATE 20 MG/1
20 TABLET ORAL DAILY
COMMUNITY

## 2025-03-05 RX ORDER — LEVOFLOXACIN 5 MG/ML
500 INJECTION, SOLUTION INTRAVENOUS EVERY 24 HOURS
Status: DISCONTINUED | OUTPATIENT
Start: 2025-03-05 | End: 2025-03-07

## 2025-03-05 RX ORDER — PROPOFOL 10 MG/ML
INJECTION, EMULSION INTRAVENOUS
Status: DISCONTINUED | OUTPATIENT
Start: 2025-03-05 | End: 2025-03-05 | Stop reason: SDUPTHER

## 2025-03-05 RX ORDER — ONDANSETRON 4 MG/1
4 TABLET, ORALLY DISINTEGRATING ORAL EVERY 8 HOURS PRN
Status: DISCONTINUED | OUTPATIENT
Start: 2025-03-05 | End: 2025-03-08 | Stop reason: HOSPADM

## 2025-03-05 RX ORDER — ROCURONIUM BROMIDE 10 MG/ML
INJECTION, SOLUTION INTRAVENOUS
Status: DISCONTINUED | OUTPATIENT
Start: 2025-03-05 | End: 2025-03-05 | Stop reason: SDUPTHER

## 2025-03-05 RX ORDER — IPRATROPIUM BROMIDE AND ALBUTEROL SULFATE 2.5; .5 MG/3ML; MG/3ML
1 SOLUTION RESPIRATORY (INHALATION) ONCE
Status: COMPLETED | OUTPATIENT
Start: 2025-03-05 | End: 2025-03-05

## 2025-03-05 RX ORDER — ENOXAPARIN SODIUM 100 MG/ML
30 INJECTION SUBCUTANEOUS DAILY
Status: DISCONTINUED | OUTPATIENT
Start: 2025-03-06 | End: 2025-03-08 | Stop reason: HOSPADM

## 2025-03-05 RX ORDER — LORAZEPAM 0.5 MG/1
0.5 TABLET ORAL ONCE
Status: COMPLETED | OUTPATIENT
Start: 2025-03-05 | End: 2025-03-05

## 2025-03-05 RX ORDER — IPRATROPIUM BROMIDE AND ALBUTEROL SULFATE 2.5; .5 MG/3ML; MG/3ML
1 SOLUTION RESPIRATORY (INHALATION)
Status: DISCONTINUED | OUTPATIENT
Start: 2025-03-05 | End: 2025-03-08 | Stop reason: HOSPADM

## 2025-03-05 RX ADMIN — LORAZEPAM 0.5 MG: 0.5 TABLET ORAL at 22:19

## 2025-03-05 RX ADMIN — PHENYLEPHRINE HYDROCHLORIDE 200 MCG: 10 INJECTION INTRAVENOUS at 14:22

## 2025-03-05 RX ADMIN — BUDESONIDE 500 MCG: 0.5 INHALANT RESPIRATORY (INHALATION) at 20:49

## 2025-03-05 RX ADMIN — WATER 125 MG: 1 INJECTION INTRAMUSCULAR; INTRAVENOUS; SUBCUTANEOUS at 08:58

## 2025-03-05 RX ADMIN — MAGNESIUM SULFATE HEPTAHYDRATE 2000 MG: 40 INJECTION, SOLUTION INTRAVENOUS at 09:59

## 2025-03-05 RX ADMIN — SUGAMMADEX 200 MG: 100 INJECTION, SOLUTION INTRAVENOUS at 14:30

## 2025-03-05 RX ADMIN — WATER 40 MG: 1 INJECTION INTRAMUSCULAR; INTRAVENOUS; SUBCUTANEOUS at 21:31

## 2025-03-05 RX ADMIN — DEXAMETHASONE SODIUM PHOSPHATE 8 MG: 4 INJECTION, SOLUTION INTRAMUSCULAR; INTRAVENOUS at 14:20

## 2025-03-05 RX ADMIN — ROCURONIUM BROMIDE 20 MG: 10 INJECTION, SOLUTION INTRAVENOUS at 14:16

## 2025-03-05 RX ADMIN — SODIUM CHLORIDE, PRESERVATIVE FREE 10 ML: 5 INJECTION INTRAVENOUS at 21:31

## 2025-03-05 RX ADMIN — ONDANSETRON 4 MG: 2 INJECTION INTRAMUSCULAR; INTRAVENOUS at 14:16

## 2025-03-05 RX ADMIN — IOPAMIDOL 75 ML: 755 INJECTION, SOLUTION INTRAVENOUS at 10:11

## 2025-03-05 RX ADMIN — ARFORMOTEROL TARTRATE 15 MCG: 15 SOLUTION RESPIRATORY (INHALATION) at 20:49

## 2025-03-05 RX ADMIN — PROPOFOL 100 MG: 10 INJECTION, EMULSION INTRAVENOUS at 14:16

## 2025-03-05 RX ADMIN — LEVOFLOXACIN 500 MG: 5 INJECTION, SOLUTION INTRAVENOUS at 16:31

## 2025-03-05 RX ADMIN — LEVALBUTEROL HYDROCHLORIDE 1.25 MG: 1.25 SOLUTION RESPIRATORY (INHALATION) at 10:48

## 2025-03-05 RX ADMIN — IPRATROPIUM BROMIDE AND ALBUTEROL SULFATE 1 DOSE: .5; 3 SOLUTION RESPIRATORY (INHALATION) at 10:48

## 2025-03-05 RX ADMIN — LIDOCAINE HYDROCHLORIDE 60 MG: 20 INJECTION, SOLUTION EPIDURAL; INFILTRATION; INTRACAUDAL; PERINEURAL at 14:16

## 2025-03-05 ASSESSMENT — ENCOUNTER SYMPTOMS
NAUSEA: 0
WHEEZING: 1
SHORTNESS OF BREATH: 1
VOMITING: 0
DIARRHEA: 0
ABDOMINAL PAIN: 0
CHEST TIGHTNESS: 0
SHORTNESS OF BREATH: 1
COUGH: 1

## 2025-03-05 ASSESSMENT — PAIN - FUNCTIONAL ASSESSMENT
PAIN_FUNCTIONAL_ASSESSMENT: NONE - DENIES PAIN
PAIN_FUNCTIONAL_ASSESSMENT: NONE - DENIES PAIN
PAIN_FUNCTIONAL_ASSESSMENT: WONG-BAKER FACES
PAIN_FUNCTIONAL_ASSESSMENT: NONE - DENIES PAIN
PAIN_FUNCTIONAL_ASSESSMENT: 0-10

## 2025-03-05 ASSESSMENT — COPD QUESTIONNAIRES: CAT_SEVERITY: SEVERE

## 2025-03-05 NOTE — PROGRESS NOTES
Reviewed patient's medical and surgical history in electronic record and with patient at the bedside. All questions regarding procedure answered.   Scope verified using 2 person system.    Electronically signed by Elaine Ramirez RN on 3/5/2025 at 2:08 PM

## 2025-03-05 NOTE — ACP (ADVANCE CARE PLANNING)
Advance Care Planning Note       Purpose of Encounter: Advanced care planning in light of hospitalization for acute on chronic hypoxic respiratory failure/COPD exacerbation  Parties in attendance: :Neena Mejia, CAROLINE MANSFIELD MD  Decisional Capacity:Yes  Objective:   Goals of Care Determinations: Pt wants full support measures including CPR, intubation, trach, PEG tube, dialysis   Code Status: Full  Time Spent on Advanced Planning Documents: 17 mins.  Advanced Care Planning Documents  Completed advances directives, advanced directives in chart.      Electronically signed by CAROLINE MANSFIELD MD on 3/5/2025 at 1:33 PM

## 2025-03-05 NOTE — PROGRESS NOTES
BAL  Location: [facility]   Input: 30 mL  Output: 15 mL  Electronically signed by Elaine Ramirez RN on 3/5/2025 at 2:27 PM

## 2025-03-05 NOTE — ANESTHESIA POSTPROCEDURE EVALUATION
Department of Anesthesiology  Postprocedure Note    Patient: Neena Mejia  MRN: [id number]  YOB: 1960  Date of evaluation: 3/5/2025    Procedure Summary       Date: 03/05/25 Room / Location: [place] [address] / [facility]    Anesthesia Start: 1414 Anesthesia Stop: 1437    Procedures:       BRONCHOSCOPY THERAPEUTIC ASPIRATION INITIAL (Abdomen)      BRONCHOSCOPY ALVEOLAR LAVAGE (Abdomen) Diagnosis:       Abnormal chest CT      (Abnormal chest CT [R93.89])    Surgeons: Chaz Ochoa MD Responsible Provider: Ramo Joshi MD    Anesthesia Type: general ASA Status: 4            Anesthesia Type: No value filed.    Sienna Phase I: Sienna Score: 9    Sienna Phase II:      Anesthesia Post Evaluation    Patient location during evaluation: PACU  Patient participation: complete - patient participated  Level of consciousness: awake and alert  Pain score: 2  Airway patency: patent  Nausea & Vomiting: no vomiting  Cardiovascular status: blood pressure returned to baseline  Respiratory status: acceptable  Hydration status: euvolemic  Multimodal analgesia pain management approach  Pain management: adequate    No notable events documented.

## 2025-03-05 NOTE — H&P
HOSPITALISTS HISTORY AND PHYSICAL    3/5/2025 1:14 PM    Patient Information:  NEENA MEJIA is a 64 y.o. female [id number]  PCP:  Haim Malagon DO (Tel: [phone] )    Chief complaint:    Chief Complaint   Patient presents with    Shortness of Breath     Patient arrives from home via [facility] for shortness of breath. Patients baseline oxygen is 3L.        History of Present Illness:  Neena Mejia is a 64 y.o. female  with PMHx of chronic hypoxic respiratory failure;  2 to 3 L NC at baseline, severe COPD, prior hx of tobacco abuse who presented with worsening SOB. Of note, pt was recently admitted at [facility] for influenza A and pneumonia; treated with antibiotics and Tamiflu and was discharged home. Patient was readmitted at [facility] within 2 days with COPD exacerbation/acute on chronic hypoxic & hypercarbic respiratory failure; treated with antibiotics& steroids and discharged home.  Per patient report, she has been experiencing progressively worsening SOB, cough and wheezing since yesterday.  Patient denied any fever, chills, chest pain, palpitations, dizziness, bowel or bladder dysfunction.  Patient had arrived to the ED via EMS on CPAP; placed BiPAP in the ED.  Initial lab work showed WBCs: 12.7 K, bicarb 41, CT chest on admission showed extensive mucous plugging and right sided airways.      REVIEW OF SYSTEMS:   Detailed 12 point ROS obtained which were negative except what mentioned above in HPI    Past Medical History:   has a past medical history of COPD (chronic obstructive pulmonary disease) (HCC).     Past Surgical History:   has a past surgical history that includes Tubal ligation and  section.     Medications:  No current facility-administered medications on file prior to encounter.     Current Outpatient Medications on File Prior to Encounter  status: Full    Admit as inpatient.  I anticipate hospitalization spanning less than two midnights for investigation and treatment of the above medically necessary diagnoses.    Spent a total of 35 minutes critical care time in assessing patient, reviewing labs, treatment plan  With an ED Physician    Please note that some part of this chart was generated using Dragon dictation software. Although every effort was made to ensure the accuracy of this automated transcription, some errors in transcription may have occurred inadvertently. If you may need any clarification, please do not hesitate to contact me through [employer].       CAROLINE MANSFIELD MD    3/5/2025 1:14 PM

## 2025-03-05 NOTE — ANESTHESIA PRE PROCEDURE
shortness of breath: chronic,  recent URI:    decreased breath sounds                              ROS comment: Smoking Hx, emphysema, Home O2 3L NC   Cardiovascular:  Exercise tolerance: poor (<4 METS)  (+) hypertension:, dysrhythmias:        Rhythm: regular                   ROS comment: TTE 2025: ·  Left Ventricle: Moderately reduced left ventricular systolic function with a visually estimated EF of 30 - 35%. Left ventricle size is normal. Normal wall thickness. There are regional wall motion abnormalities. Indeterminate diastolic function due to poor image quality.  ·  Right Ventricle: Right ventricle size is normal. Normal systolic function.  ·  Tricuspid Valve: Unable to assess RVSP.  ·  Pericardium: Trivial localized pericardial effusion present around the right ventricle. No indication of cardiac tamponade.  ·  IVC/SVC: IVC diameter is less than or equal to 21 mm and decreases greater than 50% during inspiration; therefore the estimated right atrial pressure is normal (~3 mmHg)     Neuro/Psych:   (+) neuromuscular disease:            GI/Hepatic/Renal:             Endo/Other:    (+) DiabetesType II DM, : arthritis:..                 Abdominal: normal exam            Vascular:          Other Findings:         Anesthesia Plan      general     ASA 4       Induction: intravenous.      Anesthetic plan and risks discussed with patient.    Use of blood products discussed with patient whom.    Plan discussed with CRNA.                  Navjot Peck DO   3/5/2025

## 2025-03-05 NOTE — PROCEDURES
Bronchoscopy procedure note    Indications for procedure: 64-year-old WF with COPD, recent influenza infection now coming with respiratory distress  Preprocedure diagnosis: Mucous plugging of the right sided airways  Postprocedure diagnosis: Same  Anesthesia: General  ASA: 2  Mallampati Score: 2    Procedure:   1.  Diagnostic bronchoscopy without fluoroscopy  2.  Bronchoscopic clearance of mucous plugging  3.  Bronchoalveolar lavage from right lower lobe.    Complications: None were apparent.  Blood loss: <10 mL    Description of procedure: After obtaining informed consent from the patient was set up for this procedure as an inpatient.  Patient was anesthetized. Once patient was adequately anesthetized, a size 3 LMA device was placed in the oral cavity.  Regular bronchoscope was inserted  through the existing LMA. Bronchoscope was used to perform a thorough inspetion of the tracheo-bronchial tree.  Mucous plugging was seen in the bronchus intermedius and distal right sided airways.  A sustained effort by bronchoscopy to clear the mucous plugging was done for next 10 minutes.    Subsequently bronchoscope was advanced into the lateral basilar segement of the right lower lobe. Once wedged into this segment, a BAL was performed. A total of 30 cc of normal saline was introduced and 15 cc of mucoid BAL collected back.     Once adequate sample was collected, bronchoscope was used to perform a thorough pulmonary toilet.      Patient tolerated the procedure well with no oxygen desaturation seen during the procedure.       Endobronchial findings:     Trachea: Normal mucosa   Anna Marie: Normal mucosa   Right main bronchus: Normal mucosa   Right upper lobe bronchus: Normal mucosa   Right middle lobe bronchus: Normal mucosa   Right lower lobe bronchus: Normal mucosa   Left main bronchus: Normal mucosa   Left upper lobe bronchus: Normal mucosa   Left lower lobe bronchus: Normal mucosa       Main anna marie looked sharp.  Extensive

## 2025-03-05 NOTE — PROGRESS NOTES
Patient arrived to ICU bed 4 on 3L of O2 per nasal cannula.  SpO2 86%.  O2 to 6L and SpO2 stayed at 86-87%.  Patient then placed on 10L high flow cannula and SpO2 up to 91-92%.  Will wean O2 as able.  Rhonchi throughout lungs.  Abdomen soft with BS.  Incontinent of large amt of urine.  ST on monitor.  Plan of care discussed with patient and boyfriend who is at bedside.  Per report from PACU patient will be able to eat and drink at 1630.

## 2025-03-05 NOTE — CONSULTS
Pharmacy Medication History Note      List of current medications patient is taking is complete.    Source of information: Fill history    Changes made to medication list:  Medications flagged for removal (include reason, ex. noncompliance):  N/a    Medications removed (include reason, ex. therapy complete or physician discontinued):  Trelegy ellipta  Prednisone  Medrol dospak  Alprazolam    Medications added/doses adjusted:  Lexapro 20 mg instead of 10 mg    Other notes (ex. Recent course of antibiotics, Coumadin dosing):  Multiple courses of abx and steroids for exacerbations in the last few months.   Denies use of other OTC or herbal medications.    Last dose times updated.

## 2025-03-05 NOTE — PROGRESS NOTES
Procedure completed as routine. Bedside handoff given to PACU RN.  Electronically signed by Elaine Ramirez RN on 3/5/2025 at 2:32 PM

## 2025-03-05 NOTE — ED PROVIDER NOTES
[facility] EMERGENCY DEPARTMENT  EMERGENCY DEPARTMENT ENCOUNTER        Pt Name: Neena Mejia  MRN: [id number]  Birthdate 1960  Date of evaluation: 3/5/2025  Provider: Partha Treadwell PA-C  PCP: Haim Malagon DO  Note Started: 8:39 AM EST 3/5/25       I have seen and evaluated this patient with my supervising physician Marci Alvarez,*.      CHIEF COMPLAINT       Chief Complaint   Patient presents with    Shortness of Breath     Patient arrives from home via [facility] for shortness of breath. Patients baseline oxygen is 3L.       HISTORY OF PRESENT ILLNESS: 1 or more Elements     History From: Patient     Limitations to history : None    Social Determinants Significantly Affecting Health : None    Chief Complaint: SOB    Neena Mejia is a 64 y.o. female with a history of hypertension, COPD, former tobacco abuse, diabetes and anxiety who presents to the emergency department today complaining of shortness of breath, coughing and wheezing that worsened yesterday.  Patient was just admitted at this facility 2/25/2025 through 2/28/2025 for COPD exacerbation, pneumonia and recent influenza A.  Patient states she was feeling better at time of discharge.  Patient states she was discharged with steroids been on antibiotics.  She states she has not been doing her home nebulizer treatments as they make her feel very anxious.  She arrives on CPAP via paramedics and is placed on BiPAP.  Patient denies chest pain, heart palpitations, diaphoresis, lightheadedness or dizziness.  She denies fever or chills and is nonfebrile here.         Nursing Notes were all reviewed and agreed with or any disagreements were addressed in the HPI.    REVIEW OF SYSTEMS :      Review of Systems   Constitutional:  Negative for chills and fever.   Respiratory:  Positive for cough, shortness of breath and wheezing. Negative for chest tightness.    Cardiovascular:  Negative for chest pain, palpitations and  soft.      Tenderness: There is no abdominal tenderness. There is no guarding.   Musculoskeletal:         General: Normal range of motion.      Cervical back: Normal range of motion and neck supple.      Right lower leg: No edema.      Left lower leg: No edema.   Skin:     General: Skin is warm and dry.      Capillary Refill: Capillary refill takes less than 2 seconds.      Coloration: Skin is not pale.   Neurological:      Mental Status: She is alert and oriented to person, place, and time.   Psychiatric:         Behavior: Behavior normal.           DIAGNOSTIC RESULTS   LABS:    Labs Reviewed   CBC WITH AUTO DIFFERENTIAL - Abnormal; Notable for the following components:       Result Value    WBC 12.7 (*)     Neutrophils Absolute 11.6 (*)     Lymphocytes Absolute 0.8 (*)     All other components within normal limits   COMPREHENSIVE METABOLIC PANEL W/ REFLEX TO MG FOR LOW K - Abnormal; Notable for the following components:    Chloride 91 (*)     CO2 41 (*)     Glucose 153 (*)     Creatinine 0.4 (*)     All other components within normal limits   BLOOD GAS, VENOUS - Abnormal; Notable for the following components:    pCO2, Martell 80.8 (*)     PO2, Martell 61.9 (*)     HCO3, Venous 46.8 (*)     Base Excess, Martell 16.9 (*)     Carboxyhemoglobin 3.5 (*)     All other components within normal limits   TROPONIN - Abnormal; Notable for the following components:    Troponin, High Sensitivity 27 (*)     All other components within normal limits   TROPONIN - Abnormal; Notable for the following components:    Troponin, High Sensitivity 25 (*)     All other components within normal limits   BRAIN NATRIURETIC PEPTIDE - Abnormal; Notable for the following components:    NT Pro-BNP 1,912 (*)     All other components within normal limits   BLOOD GAS, VENOUS - Abnormal; Notable for the following components:    pH, Martell 7.472 (*)     pCO2, Martell 60.5 (*)     PO2, Martell 168.0 (*)     HCO3, Venous 44.2 (*)     Base Excess, Martell 17.5 (*)

## 2025-03-05 NOTE — ED PROVIDER NOTES
[facility] Emergency Department      Pt Name: Neena Mejia  MRN: [id number]  Birthdate 1960  Date of evaluation: 3/5/2025  Provider: YVONNE CAMPBELL MD  I personally saw Neena Mejia and made and approved the management plan with the advanced practice provider.  I take responsibility for the patient management.     HPI: Neena Mejia presented with   Chief Complaint   Patient presents with    Shortness of Breath     Patient arrives from home via [facility] for shortness of breath. Patients baseline oxygen is 3L.     Neena Mejia has a past medical history of COPD (chronic obstructive pulmonary disease) ([facility]).  She has a past surgical history that includes Tubal ligation and  section.    No current facility-administered medications on file prior to encounter.     Current Outpatient Medications on File Prior to Encounter   Medication Sig Dispense Refill    escitalopram (LEXAPRO) 20 MG tablet Take 1 tablet by mouth daily      SPIRIVA RESPIMAT 2.5 MCG/ACT AERS inhaler INHALE 2 PUFFS INTO THE LUNGS DAILY 4 g 5    albuterol sulfate HFA (PROVENTIL;VENTOLIN;PROAIR) 108 (90 Base) MCG/ACT inhaler INHALE 2 PUFFS INTO THE LUNGS EVERY 6 HOURS AS NEEDED FOR WHEEZING 54 g 3    fluticasone-salmeterol (ADVAIR HFA) 230-21 MCG/ACT inhaler Inhale 2 puffs into the lungs 2 times daily 3 each 3    albuterol (PROVENTIL) (2.5 MG/3ML) 0.083% nebulizer solution Take 3 mLs by nebulization every 6 hours as needed for Wheezing Dx: COPD   ICD-10: J44.9 (Patient not taking: Reported on 2024) 120 each 0    amitriptyline (ELAVIL) 25 MG tablet Take 1 tablet by mouth nightly       PHYSICAL EXAM  Vitals: /73   Pulse 95   Temp 97.8 °F (36.6 °C) (Oral)   Resp 13   Ht 1.6 m (5' 3\")   Wt 47.2 kg (104 lb)   SpO2 99%   BMI 18.42 kg/m²   Constitutional:  64 y.o. female alert, cooperative  HENT:  Atraumatic scalp, mucous membranes moist  Eyes:   Conjunctiva clear, no icterus  Neck:  Supple, no visible JVD, no  not deemed clinically necessary in the ED setting    Medications administered:  Medications   ipratropium 0.5 mg-albuterol 2.5 mg (DUONEB) nebulizer solution 1 Dose (1 Dose Inhalation Given 3/5/25 1048)   methylPREDNISolone sodium succ (SOLU-MEDROL) 125 mg in sterile water 2 mL injection (125 mg IntraVENous Given 3/5/25 0858)   levalbuterol (XOPENEX) nebulizer solution 1.25 mg (1.25 mg Nebulization Given 3/5/25 1048)   magnesium sulfate 2000 mg in 50 mL IVPB premix (0 mg IntraVENous Stopped 3/5/25 1038)   iopamidol (ISOVUE-370) 76 % injection 75 mL (75 mLs IntraVENous Given 3/5/25 1011)     FINAL IMPRESSION:    1. Acute respiratory failure with hypercapnia (HCC)    2. COPD exacerbation (HCC)    3. Elevated troponin    4. Leukocytosis, unspecified type    5. Lymphopenia         Marci Alvarez MD  03/05/25 3615

## 2025-03-05 NOTE — CONSULTS
PULMONARY AND CRITICAL CARE MEDICINE CONSULTATION NOTE    CONSULTING PHYSICIAN:  ER    ADMISSION DATE: 3/5/2025  ADMISSION DIAGNOSIS: COPD exacerbation (HCC) [J44.1]    REASON FOR CONSULT:   Chief Complaint   Patient presents with    Shortness of Breath     Patient arrives from home via [facility] for shortness of breath. Patients baseline oxygen is 3L.       DATE OF CONSULT: 3/5/2025    HISTORY OF PRESENT ILLNESS: 64 y.o. year old female with a past medical history significant for severe COPD, chronic hypoxic respiratory failure, previous history of tobacco abuse presented to the hospital with shortness of breath.  Reports that she was feeling increased shortness of breath since yesterday and was unable to cough up anything.  Felt chest congestion.  Denied any fevers or night sweats.  Was recently discharged from [facility] due to COPD exacerbation.  Before that she was admitted to [facility] with COPD exacerbation.  Found to have influenza A infection as well as superadded pneumonia.  Was treated with oseltamavir and antibiotics.  Within 2 days of discharge had come into [facility] where she was found to have acute on chronic hypoxic and hypercapnic respiratory failure.  Did require BiPAP therapy in the recent admission.  Quickly recovered and then was discharged back home.     At the time of my evaluation, sitting in bed in mild respiratory distress.  Currently on BiPAP therapy.  Reports that her breathing is slightly better. Well-known to pulmonary service as she follows with my colleague Dr. Kurtz as an outpatient.  At home takes Symbicort and Spiriva Respimat on a regular basis and occasionally uses albuterol.  At baseline uses 2 to 3 L/min of oxygen supplementation.    REVIEW OF SYSTEMS:     Unable to be obtained as patient is on continuous BiPAP therapy.    PAST MEDICAL HISTORY:   Past Medical History:   Diagnosis Date    COPD (chronic obstructive pulmonary  \"ZTE5YVB\", \"PO2ART\", \"SAP0FKP\", \"T3WJMHWJ\", \"BEART\", \"N5NPDPED\" in the last 72 hours.    No results found for: \"INR\", \"PROTIME\"  No results found for: \"AMYLASE\"   No results found for: \"LABA1C\"  No results found for: \"EAG\"  No results found for: \"TSH\", \"B5QTEGS\", \"THYROIDAB\", \"FT3\", \"T4FREE\"  No results found for: \"CKTOTAL\", \"CKMB\", \"CKMBINDEX\", \"TROPONINI\"   No results found for: \"CRP\"   No results found for: \"BNP\"   No results found for: \"DDIMER\"   No results found for: \"FERRITIN\"   Lab Results   Component Value Date    LACTA 0.7 02/25/2025           IMAGING:    CTA OF THE CHEST 3/5/2025 10:05 am  Impression:    1. Complete occlusion of the bronchus intermedius with favoring secretions  rather than endobronchial lesion.        CULTURES:      Results       Procedure Component Value Units Date/Time    Respiratory Panel, Molecular, with COVID-19 (Restricted: peds pts or suitable admitted adults) [[id number]]  (Abnormal) Collected: 02/26/25 1100    Order Status: Completed Specimen: Nasopharyngeal Updated: 02/26/25 2334     Organism Influenza A H1-2009 by PCR     Respiratory Panel PCR --     POSITIVE FOR  See additional report for complete Respiratory Pathogen Panel      Narrative:      ORDER#: [id number]                          ORDERED BY: TYE SANZ  SOURCE: Nasopharyngeal                     COLLECTED:  02/26/25 11:00  ANTIBIOTICS AT [facility].:                      RECEIVED :  02/26/25 20:53    Respiratory Panel Film Array Report [[id number]] Collected: 02/26/25 1100    Order Status: Completed Updated: 02/26/25 2336     Report SEE IMAGE    COVID-19 & Influenza Combo [[id number]]  (Abnormal) Collected: 02/25/25 0836    Order Status: Completed Specimen: Nasopharyngeal Swab Updated: 02/25/25 0906     SARS-CoV-2 RNA, RT PCR NOT DETECTED     Comment: Not Detected results do not preclude SARS-CoV-2 infection and  should not be used as the sole basis for patient management  decisions.  Results must be combined with

## 2025-03-05 NOTE — ED NOTES
Patient Name: Neena Mejia  : 1960 64 y.o.  MRN: [id number]  ED Room #: ED-0002/02     Chief complaint:   Chief Complaint   Patient presents with    Shortness of Breath     Patient arrives from home via [facility] for shortness of breath. Patients baseline oxygen is 3L.     Hospital Problem/Diagnosis:   Hospital Problems             Last Modified POA    * (Principal) COPD exacerbation (HCC) 3/5/2025 Yes         O2 Flow Rate:O2 Device: PAP (positive airway pressure)   (if applicable)  Cardiac Rhythm:   (if applicable)  Active LDA's:   Peripheral IV 25 Right;Dorsal Forearm (Active)            How does patient ambulate? Unknown, did not assess in the Emergency Department    2. How does patient take pills? Unknown, no oral medications were given in the Emergency Department    3. Is patient alert? Alert    4. Is patient oriented? To Person, To Place, To Time, To Situation, and Follows Commands    5.   Patient arrived from:  home  Facility Name: ___________________________________________    6. If patient is disoriented or from a Skill Nursing Facility has family been notified of admission? No    7. Patient belongings? Belongings: none    Disposition of belongings? Kept with Patient     8. Any specific patient or family belongings/needs/dynamics?   a. none    9. Miscellaneous comments/pending orders?  a. none      If there are any additional questions please reach out to the Emergency Department.

## 2025-03-05 NOTE — PROGRESS NOTES
Patient admitted for Respiratory distress due to a Respiratory illness, aerosolized medication frequency changed to Q4H x 24 hours, patient to be reassessed the next day.

## 2025-03-06 LAB
ANION GAP SERPL CALCULATED.3IONS-SCNC: 5 MMOL/L (ref 3–16)
BASOPHILS # BLD: 0 K/UL (ref 0–0.2)
BASOPHILS NFR BLD: 0 %
BUN SERPL-MCNC: 13 MG/DL (ref 7–20)
CALCIUM SERPL-MCNC: 10.2 MG/DL (ref 8.3–10.6)
CHLORIDE SERPL-SCNC: 96 MMOL/L (ref 99–110)
CO2 SERPL-SCNC: 38 MMOL/L (ref 21–32)
CREAT SERPL-MCNC: 0.4 MG/DL (ref 0.6–1.2)
DEPRECATED RDW RBC AUTO: 13.6 % (ref 12.4–15.4)
EOSINOPHIL # BLD: 0 K/UL (ref 0–0.6)
EOSINOPHIL NFR BLD: 0 %
GFR SERPLBLD CREATININE-BSD FMLA CKD-EPI: >90 ML/MIN/{1.73_M2}
GLUCOSE SERPL-MCNC: 136 MG/DL (ref 70–99)
HCT VFR BLD AUTO: 39.3 % (ref 36–48)
HGB BLD-MCNC: 13.2 G/DL (ref 12–16)
LOEFFLER MB STN SPEC: NORMAL
LYMPHOCYTES # BLD: 0.6 K/UL (ref 1–5.1)
LYMPHOCYTES NFR BLD: 9.8 %
MCH RBC QN AUTO: 29.3 PG (ref 26–34)
MCHC RBC AUTO-ENTMCNC: 33.7 G/DL (ref 31–36)
MCV RBC AUTO: 86.8 FL (ref 80–100)
MONOCYTES # BLD: 0.2 K/UL (ref 0–1.3)
MONOCYTES NFR BLD: 3.4 %
NEUTROPHILS # BLD: 5.8 K/UL (ref 1.7–7.7)
NEUTROPHILS NFR BLD: 86.8 %
PLATELET # BLD AUTO: 376 K/UL (ref 135–450)
PMV BLD AUTO: 7.3 FL (ref 5–10.5)
POTASSIUM SERPL-SCNC: 5.7 MMOL/L (ref 3.5–5.1)
RBC # BLD AUTO: 4.52 M/UL (ref 4–5.2)
SODIUM SERPL-SCNC: 139 MMOL/L (ref 136–145)
WBC # BLD AUTO: 6.6 K/UL (ref 4–11)

## 2025-03-06 PROCEDURE — 97162 PT EVAL MOD COMPLEX 30 MIN: CPT

## 2025-03-06 PROCEDURE — 94640 AIRWAY INHALATION TREATMENT: CPT

## 2025-03-06 PROCEDURE — 94761 N-INVAS EAR/PLS OXIMETRY MLT: CPT

## 2025-03-06 PROCEDURE — 97166 OT EVAL MOD COMPLEX 45 MIN: CPT

## 2025-03-06 PROCEDURE — 6360000002 HC RX W HCPCS: Performed by: INTERNAL MEDICINE

## 2025-03-06 PROCEDURE — 6370000000 HC RX 637 (ALT 250 FOR IP): Performed by: INTERNAL MEDICINE

## 2025-03-06 PROCEDURE — 5A0935A ASSISTANCE WITH RESPIRATORY VENTILATION, LESS THAN 24 CONSECUTIVE HOURS, HIGH NASAL FLOW/VELOCITY: ICD-10-PCS | Performed by: INTERNAL MEDICINE

## 2025-03-06 PROCEDURE — 80048 BASIC METABOLIC PNL TOTAL CA: CPT

## 2025-03-06 PROCEDURE — 1200000000 HC SEMI PRIVATE

## 2025-03-06 PROCEDURE — 97530 THERAPEUTIC ACTIVITIES: CPT

## 2025-03-06 PROCEDURE — 36415 COLL VENOUS BLD VENIPUNCTURE: CPT

## 2025-03-06 PROCEDURE — 85025 COMPLETE CBC W/AUTO DIFF WBC: CPT

## 2025-03-06 PROCEDURE — 94669 MECHANICAL CHEST WALL OSCILL: CPT

## 2025-03-06 PROCEDURE — 99233 SBSQ HOSP IP/OBS HIGH 50: CPT | Performed by: INTERNAL MEDICINE

## 2025-03-06 PROCEDURE — 2700000000 HC OXYGEN THERAPY PER DAY

## 2025-03-06 PROCEDURE — 2500000003 HC RX 250 WO HCPCS: Performed by: INTERNAL MEDICINE

## 2025-03-06 RX ORDER — LEVALBUTEROL 1.25 MG/.5ML
1.25 SOLUTION, CONCENTRATE RESPIRATORY (INHALATION)
Status: DISCONTINUED | OUTPATIENT
Start: 2025-03-06 | End: 2025-03-07

## 2025-03-06 RX ORDER — ALPRAZOLAM 0.25 MG
0.25 TABLET ORAL EVERY 6 HOURS PRN
Status: DISCONTINUED | OUTPATIENT
Start: 2025-03-06 | End: 2025-03-08 | Stop reason: HOSPADM

## 2025-03-06 RX ORDER — ESCITALOPRAM OXALATE 10 MG/1
20 TABLET ORAL DAILY
Status: DISCONTINUED | OUTPATIENT
Start: 2025-03-06 | End: 2025-03-08 | Stop reason: HOSPADM

## 2025-03-06 RX ORDER — LEVALBUTEROL INHALATION SOLUTION 1.25 MG/3ML
1.25 SOLUTION RESPIRATORY (INHALATION) EVERY 4 HOURS PRN
Status: DISCONTINUED | OUTPATIENT
Start: 2025-03-06 | End: 2025-03-08 | Stop reason: HOSPADM

## 2025-03-06 RX ORDER — LEVALBUTEROL INHALATION SOLUTION 1.25 MG/3ML
1.25 SOLUTION RESPIRATORY (INHALATION) EVERY 4 HOURS
Status: DISCONTINUED | OUTPATIENT
Start: 2025-03-06 | End: 2025-03-06

## 2025-03-06 RX ORDER — GUAIFENESIN 600 MG/1
600 TABLET, EXTENDED RELEASE ORAL 2 TIMES DAILY
Status: DISCONTINUED | OUTPATIENT
Start: 2025-03-06 | End: 2025-03-08 | Stop reason: HOSPADM

## 2025-03-06 RX ORDER — LEVALBUTEROL 1.25 MG/.5ML
1.25 SOLUTION, CONCENTRATE RESPIRATORY (INHALATION) EVERY 4 HOURS PRN
Status: DISCONTINUED | OUTPATIENT
Start: 2025-03-06 | End: 2025-03-06

## 2025-03-06 RX ADMIN — WATER 40 MG: 1 INJECTION INTRAMUSCULAR; INTRAVENOUS; SUBCUTANEOUS at 08:31

## 2025-03-06 RX ADMIN — GUAIFENESIN 600 MG: 600 TABLET, EXTENDED RELEASE ORAL at 20:27

## 2025-03-06 RX ADMIN — ARFORMOTEROL TARTRATE 15 MCG: 15 SOLUTION RESPIRATORY (INHALATION) at 19:40

## 2025-03-06 RX ADMIN — ALPRAZOLAM 0.25 MG: 0.25 TABLET ORAL at 13:01

## 2025-03-06 RX ADMIN — SODIUM CHLORIDE, PRESERVATIVE FREE 10 ML: 5 INJECTION INTRAVENOUS at 08:32

## 2025-03-06 RX ADMIN — LEVOFLOXACIN 500 MG: 5 INJECTION, SOLUTION INTRAVENOUS at 14:06

## 2025-03-06 RX ADMIN — WATER 40 MG: 1 INJECTION INTRAMUSCULAR; INTRAVENOUS; SUBCUTANEOUS at 20:27

## 2025-03-06 RX ADMIN — IPRATROPIUM BROMIDE 0.5 MG: 0.5 SOLUTION RESPIRATORY (INHALATION) at 19:40

## 2025-03-06 RX ADMIN — GUAIFENESIN 600 MG: 600 TABLET, EXTENDED RELEASE ORAL at 11:04

## 2025-03-06 RX ADMIN — BUDESONIDE 500 MCG: 0.5 INHALANT RESPIRATORY (INHALATION) at 08:48

## 2025-03-06 RX ADMIN — ENOXAPARIN SODIUM 30 MG: 100 INJECTION SUBCUTANEOUS at 08:31

## 2025-03-06 RX ADMIN — IPRATROPIUM BROMIDE 0.5 MG: 0.5 SOLUTION RESPIRATORY (INHALATION) at 14:04

## 2025-03-06 RX ADMIN — AMITRIPTYLINE HYDROCHLORIDE 25 MG: 25 TABLET ORAL at 20:28

## 2025-03-06 RX ADMIN — LEVALBUTEROL 1.25 MG: 1.25 SOLUTION, CONCENTRATE RESPIRATORY (INHALATION) at 14:04

## 2025-03-06 RX ADMIN — SODIUM CHLORIDE, PRESERVATIVE FREE 10 ML: 5 INJECTION INTRAVENOUS at 20:27

## 2025-03-06 RX ADMIN — ALPRAZOLAM 0.25 MG: 0.25 TABLET ORAL at 20:30

## 2025-03-06 RX ADMIN — LEVALBUTEROL 1.25 MG: 1.25 SOLUTION RESPIRATORY (INHALATION) at 19:41

## 2025-03-06 RX ADMIN — ESCITALOPRAM OXALATE 20 MG: 10 TABLET ORAL at 11:04

## 2025-03-06 RX ADMIN — BUDESONIDE 500 MCG: 0.5 INHALANT RESPIRATORY (INHALATION) at 19:40

## 2025-03-06 RX ADMIN — LEVALBUTEROL 1.25 MG: 1.25 SOLUTION, CONCENTRATE RESPIRATORY (INHALATION) at 08:48

## 2025-03-06 NOTE — PROGRESS NOTES
[facility] - Inpatient Rehabilitation Department   Phone: [phone]    Physical Therapy    [x] Initial Evaluation            [] Daily Treatment Note         [] Discharge Summary      Patient: Neena Mejia   : 1960   MRN: [id number]   Date of Service:  3/6/2025  Admitting Diagnosis: COPD exacerbation (HCC)  Current Admission Summary: Per MD note, Neena Mejia is a 64 y.o. female  with PMHx of chronic hypoxic respiratory failure;  2 to 3 L NC at baseline, severe COPD, prior hx of tobacco abuse who presented with worsening SOB. Of note, pt was recently admitted at [facility] for influenza A and pneumonia; treated with antibiotics and Tamiflu and was discharged home. Patient was readmitted at [facility] within 2 days with COPD exacerbation/acute on chronic hypoxic & hypercarbic respiratory failure; treated with antibiotics& steroids and discharged home.  Per patient report, she has been experiencing progressively worsening SOB, cough and wheezing since yesterday.  Patient denied any fever, chills, chest pain, palpitations, dizziness, bowel or bladder dysfunction.  Patient had arrived to the ED via EMS on CPAP; placed BiPAP in the ED.  Initial lab work showed WBCs: 12.7 K, bicarb 41, CT chest on admission showed extensive mucous plugging and right sided airways.   Past Medical History:  has a past medical history of COPD (chronic obstructive pulmonary disease) (HCC).  Past Surgical History:  has a past surgical history that includes Tubal ligation;  section; Bronchoscopy (3/5/2025); bronchoscopy (N/A, 3/5/2025); and bronchoscopy (N/A, 3/5/2025).  Discharge Recommendations: Neena Mejia scored a 15/24 on the AM-PAC short mobility form. Current research shows that an AM-PAC score of 17 or less is typically not associated with a discharge to the patient's home setting. Based on the patient's AM-PAC score and their current functional mobility deficits, it is recommended  SPT  PT providing direct supervision during session and assisting in making skilled judgements throughout session.  Devon Corral, PT, DPT, [id number]

## 2025-03-06 NOTE — PROGRESS NOTES
PULMONARY AND CRITICAL CARE MEDICINE PROGRESS NOTE      SUBJECTIVE: Patient states improved shortness of breath and cough.  Unable to expectorate phlegm.  On 6 L nasal cannula and saturating 100%.    REVIEW OF SYSTEMS:   CONSTITUTIONAL SYMPTOMS: The patient denies fever, fatigue, night sweats, weight loss or weight gain.   HEENT: No vision changes. No tinnitus, Denies sinus pain. No hoarseness, or dysphagia.   CARDIOVASCULAR: Denies chest pain, palpitation, syncope.  RESPIRATORY: See above.   GASTROINTESTINAL: Denies nausea, abdominal pain or change in bowel function.  SKIN: No rashes or itching.   MUSCULOSKELETAL: Denies weakness or bone pain.   NEUROLOGICAL: No headaches or seizures.     MEDICATIONS:      levalbuterol  1.25 mg Nebulization Q4H    ipratropium  0.5 mg Nebulization 4x Daily RT    guaiFENesin  600 mg Oral BID    amitriptyline  25 mg Oral Nightly    escitalopram  20 mg Oral Daily    sodium chloride flush  5-40 mL IntraVENous 2 times per day    enoxaparin  30 mg SubCUTAneous Daily    methylPREDNISolone  40 mg IntraVENous Q12H    Followed by    [START ON 3/8/2025] predniSONE  40 mg Oral Daily    levofloxacin  500 mg IntraVENous Q24H    budesonide  0.5 mg Nebulization BID RT    arformoterol tartrate  15 mcg Nebulization BID RT      sodium chloride       levalbuterol, sodium chloride flush, sodium chloride, ondansetron **OR** ondansetron, polyethylene glycol, acetaminophen **OR** acetaminophen, albuterol, ipratropium 0.5 mg-albuterol 2.5 mg     ALLERGIES:   Allergies as of 03/05/2025    (No Known Allergies)        OBJECTIVE:   height is 1.6 m (5' 3\") and weight is 48.2 kg (106 lb 4.2 oz). Her temporal temperature is 97.9 °F (36.6 °C). Her blood pressure is 128/91 (abnormal) and her pulse is 102 (abnormal). Her respiration is 22 and oxygen saturation is 97%.   I/O this shift:  In: 10 [I.V.:10]  Out: -      PHYSICAL EXAM:  CONSTITUTIONAL: She is a 64 y.o.-year-old who appears her stated age. She is alert

## 2025-03-06 NOTE — PROGRESS NOTES
COMPLEX ROUNDS CHECKLIST    C Comfort - Pain Management/Sedation [] Propofol ( )  [] Fentanyl ( )  [] Precedex ( )  [] Versed ( )  [] PRN Pain Medication  [x] N/A  Santos Agitation Sedation Scale (RASS): Restless-Anxious, apprehensive, movements not aggressive   O Organisms   *Active isolation*   [x] None   M Mechanical Ventilation/Oxygenation [x]O2 Device: High flow nasal cannula       O2 Flow Rate (L/min): 6 L/min              P Prophylaxis [x] ABX (Days on abx 2)  [] GI   [x] VTE    [] Bowel regimen ( )   L Lines/Labs [] Central Venous Access: No Central Lines (Line Days:0)  [] No Central Lines (Line Days:0)  [] No Central Lines (Line Days:0)  [] Urinary Catheter: None (Urinary Catheter Days: 0)   [x] Routine Labs ordered for next day   E Enteral/Parenteral Nutrition [] Tube feed (Rate **, Goal **)  [] TPN   [x] PO Diet  [] NPO  [] Advance as tolerated   [] Awaiting/Following recommendation from SLP   X X-ray (Do we need one tomorrow?)   [] CXR   [] KUB  [x] No   C  A  R  E Care Team comments/ Family Concerns Team Members Present During Rounds: Primary RN, Charge RN, Critical Care Provider, Hospitalist, Pharmacist, and Dietician   Daily Goals/Plan of the day  Wean 02 - 88%-92%  IS and Acapella Q4  Add amitriptyline for sleep, mucinex for mucus plugs, and zoloft  Bi-pap QHS, PRN    This note is completed during interdisciplinary rounds in collaboration with the provider. Please see the critical care and/or hospitalist note for additional clarification.

## 2025-03-06 NOTE — CARE COORDINATION
Case Management Assessment  Initial Evaluation    Date/Time of Evaluation: 3/6/2025 8:50 AM  Assessment Completed by: Mahnaz Jaramillo RN    If patient is discharged prior to next notation, then this note serves as note for discharge by case management.    Patient Name: Neena Mejia                   YOB: 1960  Diagnosis: Lymphopenia [D72.810]  Elevated troponin [R79.89]  COPD exacerbation (HCC) [J44.1]  Acute respiratory failure with hypercapnia (HCC) [J96.02]  Leukocytosis, unspecified type [D72.829]                   Date / Time: 3/5/2025  8:22 AM    Patient Admission Status: Inpatient   Readmission Risk (Low < 19, Mod (19-27), High > 27): Readmission Risk Score: 13.9    Current PCP: Haim Malagon, DO  PCP verified by CM? Yes    Chart Reviewed: Yes      History Provided by: Patient  Patient Orientation: Alert and Oriented    Patient Cognition: Alert    Hospitalization in the last 30 days (Readmission):  Yes    If yes, Readmission Assessment in  Navigator will be completed.    Advance Directives:      Code Status: Full Code   Patient's Primary Decision Maker is: Legal Next of Kin      Discharge Planning:    Patient lives with: Spouse/Significant Other Type of Home: Other (Comment) (extended stay hotel)  Primary Care Giver: Self  Patient Support Systems include: Spouse/Significant Other, Children, Family Members   Current Financial resources: [employer] [place]  Current community resources: None  Current services prior to admission: Durable Medical Equipment, Oxygen Therapy            Current DME: Wheelchair, Walker, Oxygen Therapy (Comment)            Type of Home Care services:  None    ADLS  Prior functional level: Independent in ADLs/IADLs, Bathing, Dressing, Toileting, Feeding, Cooking, Housework, Shopping, Mobility  Current functional level: Independent in ADLs/IADLs, Bathing, Dressing, Toileting, Feeding, Cooking, Housework, Shopping, Mobility    PT AM-PAC:   /24  OT AM-PAC:

## 2025-03-06 NOTE — CARE COORDINATION
03/06/25 0848   Readmission Assessment   Number of Days since last admission? 8-30 days   Previous Disposition Home with Family   Who is being Interviewed Patient   What was the patient's/caregiver's perception as to why they think they needed to return back to the hospital? Other (Comment)  (Sometimes it's hard for her to breath)   Did you visit your Primary Care Physician after you left the hospital, before you returned this time? No   Why weren't you able to visit your PCP? Other (Comment)  (Not enough time to see them)   Did you see a specialist, such as Cardiac, Pulmonary, Orthopedic Physician, etc. after you left the hospital? No   Who advised the patient to return to the hospital? Self-referral   Does the patient report anything that got in the way of taking their medications? No   In our efforts to provide the best possible care to you and others like you, can you think of anything that we could have done to help you after you left the hospital the first time, so that you might not have needed to return so soon? Other (Comment)  (She feels like we took great care of her, sometimes her breathing just gets so bad she can't take it.)

## 2025-03-06 NOTE — PROGRESS NOTES
Physical/Occupational Therapy  Neena Mejia  PT/OT orders noted and appreciated. Per Crow, pt is high priority for D/C planning. However, per RNSylvia, pt with increased WOB currently and will shortly be placed on BiPAP. Requesting PT/OT HOLD at this time and follow-up with pt this afternoon if possible.    PT/OT will follow-up with pt as schedule and medical status allow.    Thank you,  Devon Corral, PT, DPT, [id number]  Derek Campos OTR/L  [id number]

## 2025-03-06 NOTE — PROGRESS NOTES
HOSPITALISTS PROGRESS NOTE    3/6/2025 9:47 AM        Name: Neena Mejia .              Admitted: 3/5/2025  Primary Care Provider: Haim Malagon DO (Tel: [phone])      Brief Course: This 64 y.o. female  with PMHx of chronic hypoxic respiratory failure;  2 to 3 L NC at baseline, severe COPD, prior hx of tobacco abuse who presented with worsening SOB.     Interval history:   Pt seen and examined today   Overnight events noted and interval ancillary notes reviewed.  Afebrile overnight, afebrile overnight, WBCs down to 6.6 K  S/p bronchoscopy.  BAL cultures pending  Currently on 6 L HFNC satting around 94%; wean as tolerated to keep sats between 88 and 90%  Pt reported that her breathing is improved but continues to have cough and unable to expectorate      Assessment & Plan:     Acute on chronic hypoxic respiratory failure likely 2/2 COPD exacerbation/mucous plugging  CT chest on admission showed extensive mucous plugging and right sided airways  Pulmonology consulted; s/p diagnostic bronchoscopy with clearance of mucous plugging on 3/5  BAL sent for microbiological testing to rule out superadded infection.    Continue Levaquin, steroids, Pulmicort, Brovana and DuoNebs.  Started on MetaNebs 4 hours  Continue BiPAP nightly and as needed during naps.       Acute COPD exacerbation  Pulmonology on board; continue IV steroids, Levaquin, Brovana, Pulmicort, DuoNebs     Hx of recent influenza A infection and pneumonia.        DVT prophylaxis: Lovenox    DVT PPX:   Code:Full Code    Disposition: Once acute medical issues have resolved    Current Medications  levalbuterol (XOPENEX) nebulizer solution 1.25 mg, Q4H  levalbuterol (XOPENEX) nebulizer solution 1.25 mg, Q4H PRN  ipratropium (ATROVENT) 0.02 % nebulizer solution 0.5 mg, 4x Daily RT  guaiFENesin (MUCINEX) extended release tablet 600 mg, BID  amitriptyline (ELAVIL) tablet 25 mg,

## 2025-03-06 NOTE — PROGRESS NOTES
[facility] - Inpatient Rehabilitation Department   Phone: [phone]    Occupational Therapy    [x] Initial Evaluation            [] Daily Treatment Note         [] Discharge Summary      Patient: Neena Mejia   : 1960   MRN: [id number]   Date of Service:  3/6/2025    Admitting Diagnosis:  COPD exacerbation (HCC)  Current Admission Summary: 64 y.o. female with a history of hypertension, COPD, former tobacco abuse, diabetes and anxiety who presents to the emergency department today complaining of shortness of breath, coughing and wheezing that worsened yesterday.  Patient was just admitted at this facility 2025 through 2025 for COPD exacerbation, pneumonia and recent influenza A.  Patient states she was feeling better at time of discharge.  Patient states she was discharged with steroids been on antibiotics.  She states she has not been doing her home nebulizer treatments as they make her feel very anxious.  She arrives on CPAP via paramedics and is placed on BiPAP.  Patient denies chest pain, heart palpitations, diaphoresis, lightheadedness or dizziness.  She denies fever or chills and is nonfebrile here.   Past Medical History:  has a past medical history of COPD (chronic obstructive pulmonary disease) (HCC).  Past Surgical History:  has a past surgical history that includes Tubal ligation;  section; Bronchoscopy (3/5/2025); bronchoscopy (N/A, 3/5/2025); and bronchoscopy (N/A, 3/5/2025).    Discharge Recommendations: Neena Mejia scored a 18/24 on the AM-PAC ADL Inpatient form. Current research shows that an AM-PAC score of 17 or less is typically not associated with a discharge to the patient's home setting. Based on the patient's AM-PAC score and their current ADL deficits, it is recommended that the patient have 3-5 sessions per week of Occupational Therapy at d/c to increase the patient's independence.  Please see assessment section for further patient specific

## 2025-03-06 NOTE — PROGRESS NOTES
Patient refused duoneb breathing treatment and stated that duoneb will increase her heart rate,  Also refused  BIPAP throughout the night.        03/06/25 0101   NIV Type   $NIV   (patient refused)   NIV Started/Stopped Off   Assessment   Pulse (!) 104   Respirations 21   SpO2 99 %   Settings/Measurements   O2 Flow Rate (L/min) 6 L/min

## 2025-03-06 NOTE — PLAN OF CARE
Problem: Safety - Adult  Goal: Free from fall injury  Outcome: Progressing     Problem: Pain  Goal: Verbalizes/displays adequate comfort level or baseline comfort level  Outcome: Progressing  Flowsheets  Taken 3/6/2025 1400  Verbalizes/displays adequate comfort level or baseline comfort level: Encourage patient to monitor pain and request assistance  Taken 3/6/2025 1300  Verbalizes/displays adequate comfort level or baseline comfort level: Encourage patient to monitor pain and request assistance  Taken 3/6/2025 1200  Verbalizes/displays adequate comfort level or baseline comfort level: Encourage patient to monitor pain and request assistance     Problem: Chronic Conditions and Co-morbidities  Goal: Patient's chronic conditions and co-morbidity symptoms are monitored and maintained or improved  Outcome: Progressing  Flowsheets (Taken 3/6/2025 0900)  Care Plan - Patient's Chronic Conditions and Co-Morbidity Symptoms are Monitored and Maintained or Improved: Monitor and assess patient's chronic conditions and comorbid symptoms for stability, deterioration, or improvement     Problem: Discharge Planning  Goal: Discharge to home or other facility with appropriate resources  Outcome: Progressing  Flowsheets (Taken 3/6/2025 0900)  Discharge to home or other facility with appropriate resources: Identify barriers to discharge with patient and caregiver

## 2025-03-07 ENCOUNTER — APPOINTMENT (OUTPATIENT)
Dept: ULTRASOUND IMAGING | Age: 65
DRG: 190 | End: 2025-03-07
Payer: MEDICARE

## 2025-03-07 LAB
ANION GAP SERPL CALCULATED.3IONS-SCNC: 7 MMOL/L (ref 3–16)
BACTERIA SPEC RESP CULT: NORMAL
BASE EXCESS BLDA CALC-SCNC: 8 MMOL/L (ref -3–3)
BASOPHILS # BLD: 0 K/UL (ref 0–0.2)
BASOPHILS NFR BLD: 0.1 %
BUN SERPL-MCNC: 18 MG/DL (ref 7–20)
CA-I BLD-SCNC: 1.19 MMOL/L (ref 1.12–1.32)
CALCIUM SERPL-MCNC: 9.5 MG/DL (ref 8.3–10.6)
CHLORIDE SERPL-SCNC: 96 MMOL/L (ref 99–110)
CO2 BLDA-SCNC: 33 MMOL/L
CO2 SERPL-SCNC: 35 MMOL/L (ref 21–32)
CREAT SERPL-MCNC: 0.5 MG/DL (ref 0.6–1.2)
DEPRECATED RDW RBC AUTO: 13.9 % (ref 12.4–15.4)
EOSINOPHIL # BLD: 0 K/UL (ref 0–0.6)
EOSINOPHIL NFR BLD: 0 %
GFR SERPLBLD CREATININE-BSD FMLA CKD-EPI: >90 ML/MIN/{1.73_M2}
GLUCOSE BLD-MCNC: 125 MG/DL (ref 70–99)
GLUCOSE SERPL-MCNC: 141 MG/DL (ref 70–99)
GRAM STN SPEC: NORMAL
HCO3 BLDA-SCNC: 32 MMOL/L (ref 21–29)
HCT VFR BLD AUTO: 36.5 % (ref 36–48)
HCT VFR BLD AUTO: 36.5 % (ref 36–48)
HCT VFR BLD AUTO: 39 % (ref 36–48)
HGB BLD CALC-MCNC: 13.3 GM/DL (ref 12–16)
HGB BLD-MCNC: 12 G/DL (ref 12–16)
HGB BLD-MCNC: 12.3 G/DL (ref 12–16)
LACTATE BLD-SCNC: 0.94 MMOL/L (ref 0.4–2)
LYMPHOCYTES # BLD: 0.8 K/UL (ref 1–5.1)
LYMPHOCYTES NFR BLD: 11 %
MCH RBC QN AUTO: 29.1 PG (ref 26–34)
MCHC RBC AUTO-ENTMCNC: 33.7 G/DL (ref 31–36)
MCV RBC AUTO: 86.3 FL (ref 80–100)
MONOCYTES # BLD: 0.4 K/UL (ref 0–1.3)
MONOCYTES NFR BLD: 5.2 %
NEUTROPHILS # BLD: 6.4 K/UL (ref 1.7–7.7)
NEUTROPHILS NFR BLD: 83.7 %
PCO2 BLDA: 45.1 MM HG (ref 35–45)
PERFORMED ON: ABNORMAL
PH BLDA: 7.46 [PH] (ref 7.35–7.45)
PLATELET # BLD AUTO: 339 K/UL (ref 135–450)
PMV BLD AUTO: 7.6 FL (ref 5–10.5)
PO2 BLDA: 64 MM HG (ref 75–108)
POC SAMPLE TYPE: ABNORMAL
POTASSIUM BLD-SCNC: 3.7 MMOL/L (ref 3.5–5.1)
POTASSIUM SERPL-SCNC: 4.1 MMOL/L (ref 3.5–5.1)
RBC # BLD AUTO: 4.23 M/UL (ref 4–5.2)
SAO2 % BLDA: 93 % (ref 93–100)
SODIUM BLD-SCNC: 140 MMOL/L (ref 136–145)
SODIUM SERPL-SCNC: 138 MMOL/L (ref 136–145)
WBC # BLD AUTO: 7.7 K/UL (ref 4–11)

## 2025-03-07 PROCEDURE — 85018 HEMOGLOBIN: CPT

## 2025-03-07 PROCEDURE — 82803 BLOOD GASES ANY COMBINATION: CPT

## 2025-03-07 PROCEDURE — 80048 BASIC METABOLIC PNL TOTAL CA: CPT

## 2025-03-07 PROCEDURE — 6370000000 HC RX 637 (ALT 250 FOR IP): Performed by: INTERNAL MEDICINE

## 2025-03-07 PROCEDURE — 6360000002 HC RX W HCPCS: Performed by: INTERNAL MEDICINE

## 2025-03-07 PROCEDURE — 94761 N-INVAS EAR/PLS OXIMETRY MLT: CPT

## 2025-03-07 PROCEDURE — 99233 SBSQ HOSP IP/OBS HIGH 50: CPT | Performed by: INTERNAL MEDICINE

## 2025-03-07 PROCEDURE — 76856 US EXAM PELVIC COMPLETE: CPT

## 2025-03-07 PROCEDURE — 85014 HEMATOCRIT: CPT

## 2025-03-07 PROCEDURE — 2700000000 HC OXYGEN THERAPY PER DAY

## 2025-03-07 PROCEDURE — 84295 ASSAY OF SERUM SODIUM: CPT

## 2025-03-07 PROCEDURE — 82947 ASSAY GLUCOSE BLOOD QUANT: CPT

## 2025-03-07 PROCEDURE — 83605 ASSAY OF LACTIC ACID: CPT

## 2025-03-07 PROCEDURE — 85025 COMPLETE CBC W/AUTO DIFF WBC: CPT

## 2025-03-07 PROCEDURE — 94669 MECHANICAL CHEST WALL OSCILL: CPT

## 2025-03-07 PROCEDURE — 82330 ASSAY OF CALCIUM: CPT

## 2025-03-07 PROCEDURE — 94640 AIRWAY INHALATION TREATMENT: CPT

## 2025-03-07 PROCEDURE — 84132 ASSAY OF SERUM POTASSIUM: CPT

## 2025-03-07 PROCEDURE — 36415 COLL VENOUS BLD VENIPUNCTURE: CPT

## 2025-03-07 PROCEDURE — 2500000003 HC RX 250 WO HCPCS: Performed by: INTERNAL MEDICINE

## 2025-03-07 PROCEDURE — 94660 CPAP INITIATION&MGMT: CPT

## 2025-03-07 PROCEDURE — 2060000000 HC ICU INTERMEDIATE R&B

## 2025-03-07 RX ORDER — LEVALBUTEROL 1.25 MG/.5ML
1.25 SOLUTION, CONCENTRATE RESPIRATORY (INHALATION)
Status: DISCONTINUED | OUTPATIENT
Start: 2025-03-07 | End: 2025-03-08 | Stop reason: HOSPADM

## 2025-03-07 RX ADMIN — LEVALBUTEROL 1.25 MG: 1.25 SOLUTION, CONCENTRATE RESPIRATORY (INHALATION) at 20:27

## 2025-03-07 RX ADMIN — LEVALBUTEROL 1.25 MG: 1.25 SOLUTION, CONCENTRATE RESPIRATORY (INHALATION) at 07:45

## 2025-03-07 RX ADMIN — IPRATROPIUM BROMIDE 0.5 MG: 0.5 SOLUTION RESPIRATORY (INHALATION) at 07:42

## 2025-03-07 RX ADMIN — SODIUM CHLORIDE, PRESERVATIVE FREE 10 ML: 5 INJECTION INTRAVENOUS at 21:44

## 2025-03-07 RX ADMIN — GUAIFENESIN 600 MG: 600 TABLET, EXTENDED RELEASE ORAL at 08:36

## 2025-03-07 RX ADMIN — IPRATROPIUM BROMIDE 0.5 MG: 0.5 SOLUTION RESPIRATORY (INHALATION) at 20:28

## 2025-03-07 RX ADMIN — BUDESONIDE 500 MCG: 0.5 INHALANT RESPIRATORY (INHALATION) at 20:29

## 2025-03-07 RX ADMIN — ESCITALOPRAM OXALATE 20 MG: 10 TABLET ORAL at 08:36

## 2025-03-07 RX ADMIN — BUDESONIDE 500 MCG: 0.5 INHALANT RESPIRATORY (INHALATION) at 07:42

## 2025-03-07 RX ADMIN — IPRATROPIUM BROMIDE 0.5 MG: 0.5 SOLUTION RESPIRATORY (INHALATION) at 16:25

## 2025-03-07 RX ADMIN — ARFORMOTEROL TARTRATE 15 MCG: 15 SOLUTION RESPIRATORY (INHALATION) at 20:27

## 2025-03-07 RX ADMIN — AMITRIPTYLINE HYDROCHLORIDE 25 MG: 25 TABLET ORAL at 21:43

## 2025-03-07 RX ADMIN — ENOXAPARIN SODIUM 30 MG: 100 INJECTION SUBCUTANEOUS at 08:36

## 2025-03-07 RX ADMIN — ALPRAZOLAM 0.25 MG: 0.25 TABLET ORAL at 21:43

## 2025-03-07 RX ADMIN — GUAIFENESIN 600 MG: 600 TABLET, EXTENDED RELEASE ORAL at 21:43

## 2025-03-07 RX ADMIN — ARFORMOTEROL TARTRATE 15 MCG: 15 SOLUTION RESPIRATORY (INHALATION) at 07:42

## 2025-03-07 RX ADMIN — WATER 40 MG: 1 INJECTION INTRAMUSCULAR; INTRAVENOUS; SUBCUTANEOUS at 08:36

## 2025-03-07 RX ADMIN — LEVALBUTEROL 1.25 MG: 1.25 SOLUTION, CONCENTRATE RESPIRATORY (INHALATION) at 16:26

## 2025-03-07 NOTE — PROGRESS NOTES
Physician Progress Note      PATIENT:               ALCIRA PARHAM  CSN #:                  [id number]  :                       1960  ADMIT DATE:       2025 8:18 AM  DISCH DATE:        2025 4:39 PM  RESPONDING  PROVIDER #:        Steven Kearney DO          QUERY TEXT:    Patient admitted with ae COPD. Noted to have severe malnutrition per dietitian   notes. If possible, please document in progress notes and discharge summary   if you are evaluating and /or treating any of the following:    The medical record reflects the following:  Risk Factors: ae COPD, influenza, pneumonia  Clinical Indicators: dietitian consult- Malnutrition Status: Severe   malnutrition  Chronic Illness  Findings of the 6 clinical characteristics of malnutrition:  Energy Intake:  75% or less estimated energy requirements for 1 month or   longer (as evidenced by wt loss)  Weight Loss:  Greater than 10% over 6 months (46 lb (30%) wt loss in 21   months, 16 lb (13%) wt loss in 5 months)  NUTRITION DIAGNOSIS  -         Severe malnutrition, in context of chronic illness related to   catabolic illness (COPD) as evidenced by criteria as identified in   malnutrition assessment    Goals: PO intake 50% or greater, by next RD assessment  Treatment: dietitian consult, Continue current diet, Ordered Magic Cup and   Gelatein each once daily for trial, monitor weight      ASPEN Criteria:    https://aspenjournals.onlinelibrary.baez.com/doi/full/10.1177/673634828707467  5  Options provided:  -- Protein calorie malnutrition severe  -- Other - I will add my own diagnosis  -- Disagree - Not applicable / Not valid  -- Disagree - Clinically unable to determine / Unknown  -- Refer to Clinical Documentation Reviewer    PROVIDER RESPONSE TEXT:    This patient has severe protein calorie malnutrition.    Query created by: Samira Mcclendon on 3/4/2025 3:03 PM      Electronically signed by:  Steven Kearney DO 3/7/2025 9:25 AM

## 2025-03-07 NOTE — PROGRESS NOTES
03/07/25 0150   RT Protocol   History Pulmonary Disease 2   Respiratory pattern 2   Breath sounds 2   Cough 0   Bronchodilator Assessment Score 6

## 2025-03-07 NOTE — PROGRESS NOTES
COMPLEX ROUNDS CHECKLIST    C Comfort - Pain Management/Sedation [] Propofol ( )  [] Fentanyl ( )  [] Precedex ( )  [] Versed ( )  [] PRN Pain Medication  [x] N/A  Santos Agitation Sedation Scale (RASS): Alert & Calm-Spontaneously pays attention to caregiver   O Organisms   *Active isolation*   [x] Droplet   M Mechanical Ventilation/Oxygenation [x] Nasal cannula     4 L              P Prophylaxis ABX  [] GI   [x] VTE    [] Bowel regimen ( )   L Lines/Labs [x] Central Venous Access: No Central Lines (Line Days:0)  [] Urinary Catheter: None (Urinary Catheter Days: )   [x] Routine Labs ordered for next day   E Enteral/Parenteral Nutrition [] Tube feed (Rate **, Goal **)  [] TPN   [x] PO Diet  [] NPO  [] Advance as tolerated   [] Awaiting/Following recommendation from SLP   X X-ray (Do we need one tomorrow?)   [] CXR   [] KUB  [x] No   C  A  R  E Care Team comments/ Family Concerns Team Members Present During Rounds: Primary RN, Critical Care Provider, Hospitalist, Pharmacist, and Dietician   Daily Goals/Plan of the day  Needs new nebulizer and NIV at home.  Reach out to case management or financial assistance due to living in hotel.     This note is completed during interdisciplinary rounds in collaboration with the provider. Please see the critical care and/or hospitalist note for additional clarification.

## 2025-03-07 NOTE — PLAN OF CARE
Problem: Safety - Adult  Goal: Free from fall injury  3/7/2025 1001 by Emely Mitchell RN  Outcome: Progressing  Flowsheets (Taken 3/7/2025 1001)  Free From Fall Injury: Instruct family/caregiver on patient safety  3/6/2025 2240 by Jenn Lovell RN  Outcome: Progressing     Problem: Pain  Goal: Verbalizes/displays adequate comfort level or baseline comfort level  3/7/2025 1001 by Emely Mitchell RN  Outcome: Progressing  Flowsheets (Taken 3/7/2025 1001)  Verbalizes/displays adequate comfort level or baseline comfort level:   Encourage patient to monitor pain and request assistance   Assess pain using appropriate pain scale  3/6/2025 2240 by Jenn Lovell RN  Outcome: Progressing     Problem: Chronic Conditions and Co-morbidities  Goal: Patient's chronic conditions and co-morbidity symptoms are monitored and maintained or improved  3/7/2025 1001 by Emely Mitchell RN  Outcome: Progressing  Flowsheets  Taken 3/7/2025 1001  Care Plan - Patient's Chronic Conditions and Co-Morbidity Symptoms are Monitored and Maintained or Improved: Monitor and assess patient's chronic conditions and comorbid symptoms for stability, deterioration, or improvement  Taken 3/7/2025 0845  Care Plan - Patient's Chronic Conditions and Co-Morbidity Symptoms are Monitored and Maintained or Improved: Monitor and assess patient's chronic conditions and comorbid symptoms for stability, deterioration, or improvement  3/6/2025 2240 by Jenn Lovell RN  Outcome: Progressing     Problem: Discharge Planning  Goal: Discharge to home or other facility with appropriate resources  3/7/2025 1001 by Emely Mitchell RN  Outcome: Progressing  Flowsheets (Taken 3/7/2025 0845)  Discharge to home or other facility with appropriate resources: Identify barriers to discharge with patient and caregiver  Note: Collaborate with CM for need home DME nebulizer and NIV   3/6/2025 2240 by Jenn Lovell RN  Outcome:

## 2025-03-07 NOTE — PLAN OF CARE
Problem: Safety - Adult  Goal: Free from fall injury  3/6/2025 2240 by Jenn Lovell RN  Outcome: Progressing  3/6/2025 1538 by Emely Mitchell RN  Outcome: Progressing     Problem: Pain  Goal: Verbalizes/displays adequate comfort level or baseline comfort level  3/6/2025 2240 by Jenn Lovell RN  Outcome: Progressing  3/6/2025 1538 by Emely Mitchell RN  Outcome: Progressing  Flowsheets  Taken 3/6/2025 1400  Verbalizes/displays adequate comfort level or baseline comfort level: Encourage patient to monitor pain and request assistance  Taken 3/6/2025 1300  Verbalizes/displays adequate comfort level or baseline comfort level: Encourage patient to monitor pain and request assistance  Taken 3/6/2025 1200  Verbalizes/displays adequate comfort level or baseline comfort level: Encourage patient to monitor pain and request assistance     Problem: Chronic Conditions and Co-morbidities  Goal: Patient's chronic conditions and co-morbidity symptoms are monitored and maintained or improved  3/6/2025 2240 by Jenn Lovell RN  Outcome: Progressing  3/6/2025 1538 by Emely Mitchell RN  Outcome: Progressing  Flowsheets (Taken 3/6/2025 0900)  Care Plan - Patient's Chronic Conditions and Co-Morbidity Symptoms are Monitored and Maintained or Improved: Monitor and assess patient's chronic conditions and comorbid symptoms for stability, deterioration, or improvement     Problem: Discharge Planning  Goal: Discharge to home or other facility with appropriate resources  3/6/2025 2240 by Jenn Lovell RN  Outcome: Progressing  3/6/2025 1538 by Emely Mithcell RN  Outcome: Progressing  Flowsheets (Taken 3/6/2025 0900)  Discharge to home or other facility with appropriate resources: Identify barriers to discharge with patient and caregiver     Problem: Skin/Tissue Integrity  Goal: Skin integrity remains intact  Description: 1.  Monitor for areas of redness and/or skin breakdown  2.

## 2025-03-07 NOTE — PROGRESS NOTES
Pt noted with  vaginal bleeding, moderate size blood clot noted in brief, bright red blood wiped during axel-care. Pt had large BM. RN notified MD via perfect service.   See new orders, per US please have pt drink 32 oz of fluids.

## 2025-03-07 NOTE — RT PROTOCOL NOTE
RT Inhaler-Nebulizer Bronchodilator Protocol Note    There is a bronchodilator order in the chart from a provider indicating to follow the RT Bronchodilator Protocol and there is an “Initiate RT Inhaler-Nebulizer Bronchodilator Protocol” order as well (see protocol at bottom of note).    CXR Findings:  XR CHEST PORTABLE    Result Date: 3/5/2025  No acute process. Improved aeration right lung base       The findings from the last RT Protocol Assessment were as follows:   History Pulmonary Disease: Chronic pulmonary disease  Respiratory Pattern: Dyspnea on exertion or RR 21-25 bpm  Breath Sounds: Slightly diminished and/or crackles  Cough: Strong, spontaneous, non-productive  Indication for Bronchodilator Therapy:    Bronchodilator Assessment Score: 6    Aerosolized bronchodilator medication orders have been revised according to the RT Inhaler-Nebulizer Bronchodilator Protocol below.    Respiratory Therapist to perform RT Therapy Protocol Assessment initially then follow the protocol.  Repeat RT Therapy Protocol Assessment PRN for score 0-3 or on second treatment, BID, and PRN for scores above 3.    No Indications - adjust the frequency to every 6 hours PRN wheezing or bronchospasm, if no treatments needed after 48 hours then discontinue using Per Protocol order mode.     If indication present, adjust the RT bronchodilator orders based on the Bronchodilator Assessment Score as indicated below.  Use Inhaler orders unless patient has one or more of the following: on home nebulizer, not able to hold breath for 10 seconds, is not alert and oriented, cannot activate and use MDI correctly, or respiratory rate 25 breaths per minute or more, then use the equivalent nebulizer order(s) with same Frequency and PRN reasons based on the score.  If a patient is on this medication at home then do not decrease Frequency below that used at home.    0-3 - enter or revise RT bronchodilator order(s) to equivalent RT Bronchodilator order  with Frequency of every 4 hours PRN for wheezing or increased work of breathing using Per Protocol order mode.        4-6 - enter or revise RT Bronchodilator order(s) to two equivalent RT bronchodilator orders with one order with BID Frequency and one order with Frequency of every 4 hours PRN wheezing or increased work of breathing using Per Protocol order mode.        7-10 - enter or revise RT Bronchodilator order(s) to two equivalent RT bronchodilator orders with one order with TID Frequency and one order with Frequency of every 4 hours PRN wheezing or increased work of breathing using Per Protocol order mode.       11-13 - enter or revise RT Bronchodilator order(s) to one equivalent RT bronchodilator order with QID Frequency and an Albuterol order with Frequency of every 4 hours PRN wheezing or increased work of breathing using Per Protocol order mode.      Greater than 13 - enter or revise RT Bronchodilator order(s) to one equivalent RT bronchodilator order with every 4 hours Frequency and an Albuterol order with Frequency of every 2 hours PRN wheezing or increased work of breathing using Per Protocol order mode.     RT to enter RT Home Evaluation for COPD & MDI Assessment order using Per Protocol order mode.    Electronically signed by Brunilda Bradley RCP on 3/7/2025 at 1:50 AM

## 2025-03-07 NOTE — PROGRESS NOTES
HOSPITALISTS PROGRESS NOTE    3/7/2025 9:31 AM        Name: Neena Mejia .              Admitted: 3/5/2025  Primary Care Provider: Haim Malagon DO (Tel: [phone])      Brief Course: This 64 y.o. female  with PMHx of chronic hypoxic respiratory failure;  2 to 3 L NC at baseline, severe COPD, prior hx of tobacco abuse who presented with worsening SOB.     Interval history:   Pt seen and examined today.  Overnight events noted, interval ancillary notes and labs reviewed.   On 4 L HFNC satting around 100%; wean as tolerated to keep sat 90 to 92%.  Afebrile overnight, WBCs WNL.  BAL culture NGTD  Up in bed; reported she is feeling much better.  For BiPAP last night breathing and cough improved          Assessment & Plan:     Acute on chronic hypoxic respiratory failure likely 2/2 COPD exacerbation/mucous plugging: Improving  CT chest on admission showed extensive mucous plugging and right sided airways  Pulmonology consulted; s/p diagnostic bronchoscopy with clearance of mucous plugging on 3/5  BAL cultures negative.  Levaquin DC'd.  Switch to p.o. steroid taper   Continue I-S, Mucinex Pulmicort, Brovana, MetaNebs and DuoNebs  Continue BiPAP nightly and as needed during naps.       Acute COPD exacerbation  Pulmonology on board; continue p.o. steroids, Levaquin, Brovana, Pulmicort, DuoNebs     Hx of recent influenza A infection and pneumonia.        DVT prophylaxis: Lovenox    DVT PPX:   Code:Full Code    Disposition: Once acute medical issues have resolved    Current Medications  ipratropium (ATROVENT) 0.02 % nebulizer solution 0.5 mg, TID RT  levalbuterol (XOPENEX) nebulizer solution 1.25 mg, TID RT  guaiFENesin (MUCINEX) extended release tablet 600 mg, BID  amitriptyline (ELAVIL) tablet 25 mg, Nightly  escitalopram (LEXAPRO) tablet 20 mg, Daily  ALPRAZolam (XANAX) tablet 0.25 mg, Q6H PRN  levalbuterol (XOPENEX) nebulizer solution

## 2025-03-07 NOTE — PROGRESS NOTES
PULMONARY AND CRITICAL CARE MEDICINE PROGRESS NOTE      SUBJECTIVE: Patient is sitting comfortably in the bed.  States improved shortness of breath and cough.  Able to clear mucus now.  On 3 L nasal cannula and saturating well.  Wore BiPAP last night.    REVIEW OF SYSTEMS:   CONSTITUTIONAL SYMPTOMS: The patient denies fever, fatigue, night sweats, weight loss or weight gain.   HEENT: No vision changes. No tinnitus, Denies sinus pain. No hoarseness, or dysphagia.   CARDIOVASCULAR: Denies chest pain, palpitation, syncope.  RESPIRATORY: See above  GASTROINTESTINAL: Denies nausea, abdominal pain or change in bowel function.  SKIN: No rashes or itching.   MUSCULOSKELETAL: Denies weakness or bone pain.   NEUROLOGICAL: No headaches or seizures.     MEDICATIONS:      ipratropium  0.5 mg Nebulization TID RT    levalbuterol  1.25 mg Nebulization TID RT    guaiFENesin  600 mg Oral BID    amitriptyline  25 mg Oral Nightly    escitalopram  20 mg Oral Daily    sodium chloride flush  5-40 mL IntraVENous 2 times per day    enoxaparin  30 mg SubCUTAneous Daily    [START ON 3/8/2025] predniSONE  40 mg Oral Daily    levofloxacin  500 mg IntraVENous Q24H    budesonide  0.5 mg Nebulization BID RT    arformoterol tartrate  15 mcg Nebulization BID RT      sodium chloride       ALPRAZolam, levalbuterol, sodium chloride flush, sodium chloride, ondansetron **OR** ondansetron, polyethylene glycol, acetaminophen **OR** acetaminophen, albuterol, ipratropium 0.5 mg-albuterol 2.5 mg     ALLERGIES:   Allergies as of 03/05/2025    (No Known Allergies)        OBJECTIVE:   height is 1.6 m (5' 3\") and weight is 48.8 kg (107 lb 9.4 oz). Her temporal temperature is 97.4 °F (36.3 °C). Her blood pressure is 162/84 (abnormal) and her pulse is 84. Her respiration is 18 and oxygen saturation is 100%.   I/O this shift:  In: -   Out: 600 [Urine:600]     PHYSICAL EXAM:  CONSTITUTIONAL: She is a 64 y.o.-year-old who appears her stated age. She is alert and  exacerbation and mucous plugging.  Underwent bronchoscopy on 3/5 with removal of extensive mucous plugs from bronchus intermedius and distal airways.  Continue incentive spirometry and Acapella every 4 hours.  Continue MetaNebs every 4 hours.  Mucinex twice daily  BiPAP at night.  Overnight pulse oximetry and ABG now to see if patient qualifies for BiPAP/NIV.     Patient has severe COPD.  Continue Xopenex and Atrovent nebs.  Continue Pulmicort and Browner nebs.  Continue prednisone prednisone with taper  BAL culture negative.  Discontinue Levaquin  Wean FiO2 as tolerated for saturation of 88 to 92%.      Nargis Kurtz MD  Pulmonary Critical Care and Sleep Medicine  3/7/2025, 12:23 PM    This note was completed using dragon medical speech recognition software. Grammatical errors, random word insertions, pronoun errors and incomplete sentences are occasional consequences of this technology due to software limitations. If there are questions or concerns about the content of this note of information contained within the body of this dictation they should be addressed with the provider for clarification.

## 2025-03-08 VITALS
HEART RATE: 95 BPM | BODY MASS INDEX: 19.06 KG/M2 | TEMPERATURE: 98.4 F | OXYGEN SATURATION: 91 % | HEIGHT: 63 IN | RESPIRATION RATE: 18 BRPM | WEIGHT: 107.58 LBS | DIASTOLIC BLOOD PRESSURE: 77 MMHG | SYSTOLIC BLOOD PRESSURE: 132 MMHG

## 2025-03-08 LAB
ANION GAP SERPL CALCULATED.3IONS-SCNC: 7 MMOL/L (ref 3–16)
BASE EXCESS BLDA CALC-SCNC: 11.1 MMOL/L (ref -3–3)
BASOPHILS # BLD: 0 K/UL (ref 0–0.2)
BASOPHILS NFR BLD: 0.3 %
BUN SERPL-MCNC: 16 MG/DL (ref 7–20)
CALCIUM SERPL-MCNC: 9.2 MG/DL (ref 8.3–10.6)
CHLORIDE SERPL-SCNC: 100 MMOL/L (ref 99–110)
CO2 BLDA-SCNC: 85.8 MMOL/L
CO2 SERPL-SCNC: 32 MMOL/L (ref 21–32)
COHGB MFR BLDA: 1.3 % (ref 0–1.5)
CREAT SERPL-MCNC: 0.4 MG/DL (ref 0.6–1.2)
DEPRECATED RDW RBC AUTO: 13.6 % (ref 12.4–15.4)
EOSINOPHIL # BLD: 0 K/UL (ref 0–0.6)
EOSINOPHIL NFR BLD: 0.6 %
GFR SERPLBLD CREATININE-BSD FMLA CKD-EPI: >90 ML/MIN/{1.73_M2}
GLUCOSE SERPL-MCNC: 91 MG/DL (ref 70–99)
HCO3 BLDA-SCNC: 36.7 MMOL/L (ref 21–29)
HCT VFR BLD AUTO: 37 % (ref 36–48)
HGB BLD-MCNC: 12.4 G/DL (ref 12–16)
HGB BLDA-MCNC: 12 G/DL (ref 12–16)
LYMPHOCYTES # BLD: 2.6 K/UL (ref 1–5.1)
LYMPHOCYTES NFR BLD: 34.6 %
MCH RBC QN AUTO: 29.1 PG (ref 26–34)
MCHC RBC AUTO-ENTMCNC: 33.6 G/DL (ref 31–36)
MCV RBC AUTO: 86.7 FL (ref 80–100)
METHGB MFR BLDA: 0.8 %
MONOCYTES # BLD: 0.7 K/UL (ref 0–1.3)
MONOCYTES NFR BLD: 8.9 %
NEUTROPHILS # BLD: 4.2 K/UL (ref 1.7–7.7)
NEUTROPHILS NFR BLD: 55.6 %
O2 THERAPY: ABNORMAL
PCO2 BLDA: 52.1 MMHG (ref 35–45)
PH BLDA: 7.46 [PH] (ref 7.35–7.45)
PLATELET # BLD AUTO: 286 K/UL (ref 135–450)
PMV BLD AUTO: 7.4 FL (ref 5–10.5)
PO2 BLDA: 99.1 MMHG (ref 75–108)
POTASSIUM SERPL-SCNC: 3.6 MMOL/L (ref 3.5–5.1)
RBC # BLD AUTO: 4.26 M/UL (ref 4–5.2)
SAO2 % BLDA: 98.7 %
SODIUM SERPL-SCNC: 139 MMOL/L (ref 136–145)
WBC # BLD AUTO: 7.5 K/UL (ref 4–11)

## 2025-03-08 PROCEDURE — 94761 N-INVAS EAR/PLS OXIMETRY MLT: CPT

## 2025-03-08 PROCEDURE — 2700000000 HC OXYGEN THERAPY PER DAY

## 2025-03-08 PROCEDURE — 97530 THERAPEUTIC ACTIVITIES: CPT

## 2025-03-08 PROCEDURE — 6360000002 HC RX W HCPCS: Performed by: INTERNAL MEDICINE

## 2025-03-08 PROCEDURE — 94640 AIRWAY INHALATION TREATMENT: CPT

## 2025-03-08 PROCEDURE — 99233 SBSQ HOSP IP/OBS HIGH 50: CPT | Performed by: INTERNAL MEDICINE

## 2025-03-08 PROCEDURE — 6370000000 HC RX 637 (ALT 250 FOR IP): Performed by: INTERNAL MEDICINE

## 2025-03-08 PROCEDURE — 82803 BLOOD GASES ANY COMBINATION: CPT

## 2025-03-08 PROCEDURE — 80048 BASIC METABOLIC PNL TOTAL CA: CPT

## 2025-03-08 PROCEDURE — 85025 COMPLETE CBC W/AUTO DIFF WBC: CPT

## 2025-03-08 PROCEDURE — 94669 MECHANICAL CHEST WALL OSCILL: CPT

## 2025-03-08 PROCEDURE — 2500000003 HC RX 250 WO HCPCS: Performed by: INTERNAL MEDICINE

## 2025-03-08 PROCEDURE — 36415 COLL VENOUS BLD VENIPUNCTURE: CPT

## 2025-03-08 PROCEDURE — 94762 N-INVAS EAR/PLS OXIMTRY CONT: CPT

## 2025-03-08 PROCEDURE — 97116 GAIT TRAINING THERAPY: CPT

## 2025-03-08 RX ORDER — PREDNISONE 20 MG/1
TABLET ORAL
Qty: 9 TABLET | Refills: 0 | Status: SHIPPED | OUTPATIENT
Start: 2025-03-09 | End: 2025-03-15

## 2025-03-08 RX ORDER — GUAIFENESIN 600 MG/1
600 TABLET, EXTENDED RELEASE ORAL 2 TIMES DAILY
Qty: 14 TABLET | Refills: 0 | Status: SHIPPED | OUTPATIENT
Start: 2025-03-08 | End: 2025-03-15

## 2025-03-08 RX ADMIN — ENOXAPARIN SODIUM 30 MG: 100 INJECTION SUBCUTANEOUS at 10:09

## 2025-03-08 RX ADMIN — LEVALBUTEROL 1.25 MG: 1.25 SOLUTION, CONCENTRATE RESPIRATORY (INHALATION) at 08:40

## 2025-03-08 RX ADMIN — SODIUM CHLORIDE, PRESERVATIVE FREE 10 ML: 5 INJECTION INTRAVENOUS at 10:10

## 2025-03-08 RX ADMIN — IPRATROPIUM BROMIDE 0.5 MG: 0.5 SOLUTION RESPIRATORY (INHALATION) at 08:40

## 2025-03-08 RX ADMIN — PREDNISONE 40 MG: 20 TABLET ORAL at 10:05

## 2025-03-08 RX ADMIN — BUDESONIDE 500 MCG: 0.5 INHALANT RESPIRATORY (INHALATION) at 08:40

## 2025-03-08 RX ADMIN — GUAIFENESIN 600 MG: 600 TABLET, EXTENDED RELEASE ORAL at 10:05

## 2025-03-08 RX ADMIN — ALPRAZOLAM 0.25 MG: 0.25 TABLET ORAL at 10:06

## 2025-03-08 RX ADMIN — ESCITALOPRAM OXALATE 20 MG: 10 TABLET ORAL at 10:05

## 2025-03-08 NOTE — CARE COORDINATION
DISCHARGE ORDER  Date/Time 3/8/2025 3:54 PM  Completed by: Teresa Boeck, RN, Case Management    Patient Name: Neena Mejia      : 1960  Admitting Diagnosis: Lymphopenia [D72.810]  Elevated troponin [R79.89]  COPD exacerbation (HCC) [J44.1]  Acute respiratory failure with hypercapnia (HCC) [J96.02]  Leukocytosis, unspecified type [D72.829]      Admit order Date and Status:3/5/2025  (verify MD's last order for status of admission)      Noted discharge order.   If applicable PT/OT recommendation at Discharge: Home Health OT/PT.   DME recommendation by PT/OT:  Confirmed discharge plan: Yes  with whom - the patient    The patient will discharge to home with [facility].     If pt confirmed DC plan does family need to be contacted by  No if yes who______  Discharge Plan: The patient will discharge home. Family to transport.     Date of Last IMM Given: 3/8/2025    Reviewed chart.  Role of discharge planner explained and patient verbalized understanding. Discharge order is noted.    Has Home O2 in place on admit:  Yes  Informed of need to bring portable home O2 tank on day of discharge for nursing to connect prior to leaving:   Yes  Verbalized agreement/Understanding:   Yes  Pt is being d/c'd to home today. Pt's O2 sats are 91% on 3L.    Discharge timeout done with Harini ARTEAGA. All discharge needs and concerns addressed.

## 2025-03-08 NOTE — DISCHARGE INSTR - COC
Continuity of Care Form    Patient Name: Neena Mejia   :  1960  MRN:  [id number]    Admit date:  3/5/2025  Discharge date:  3/8/2025    Code Status Order: Full Code   Advance Directives:    Date/Time Healthcare Directive Type of Healthcare Directive Copy in Chart Healthcare Agent Appointed Healthcare Agent's Name Healthcare Agent's Phone Number    [phone] No, patient does not have an advance directive for healthcare treatment  --  --  --  --  --             Admitting Physician:  Christa Page MD  PCP: Haim Malagon DO    Discharging Nurse: SHILPI Alvarez  Discharging Hospital Unit/Room#: 3TN-3358/3358-01  Discharging Unit Phone Number: [phone]    Emergency Contact:   Extended Emergency Contact Information  Primary Emergency Contact: PEDRO MIXON  Mobile Phone: [phone]  Relation: Other    Past Surgical History:  Past Surgical History:   Procedure Laterality Date    BRONCHOSCOPY  3/5/2025    BRONCHOSCOPY N/A 3/5/2025    BRONCHOSCOPY THERAPEUTIC ASPIRATION INITIAL performed by Chaz Ochoa MD at [facility] ENDOSCOPY    BRONCHOSCOPY N/A 3/5/2025    BRONCHOSCOPY ALVEOLAR LAVAGE performed by Chaz Ochoa MD at [facility] ENDOSCOPY     SECTION      TUBAL LIGATION         Immunization History:     There is no immunization history on file for this patient.    Active Problems:  Patient Active Problem List   Diagnosis Code    SOB (shortness of breath) R06.02    COPD, severe ([facility]) J44.9    Centrilobular emphysema ([facility]) J43.2    Pulmonary nodule R91.1    Chronic hypoxemic respiratory failure ([facility]) J96.11    Former smoker Z87.891    COPD exacerbation ([facility]) J44.1    Pneumonia involving left lung J18.9    HTN (hypertension) I10    Controlled type 2 diabetes mellitus, with long-term current use of insulin ([facility]) E11.9, Z79.4    Atrial tachycardia I47.19    Severe malnutrition E43    Acute on chronic respiratory failure with hypoxia and hypercapnia ([facility]) J96.21, J96.22    Personal

## 2025-03-08 NOTE — PROGRESS NOTES
03/07/25 2159   Oxygen Therapy/Pulse Ox   O2 Therapy Oxygen   O2 Device Nasal cannula   O2 Flow Rate (L/min) 3 L/min   Pulse Oximeter Device Mode Continuous  (nocturnal pulse ox. iniated)   Pulse Oximeter Device Location Finger;Left

## 2025-03-08 NOTE — DISCHARGE SUMMARY
Pt d/c'd home with home health care.  Removed two  PIV and stopped bleeding. Catheter intact. Pt tolerated well. No redness noted at site. Removed tele box and returned to monitor tech at nurses station. Reviewed d/c instructions, home meds, and  f/u information utilizing teach-back method.  Scripts for prednisone and Mucinex sent electronically to pt's preferred pharmacy- at [facility]. Patient verbalized understanding. All personal belongings gathered and sent down with pt via wheelchair where she left in a car with her boyfriend.

## 2025-03-08 NOTE — DISCHARGE SUMMARY
appointment with gynecology for evaluation of vaginal bleeding.  Pelvic US showed uterine fibroids  Take all your medications as prescribed.    Discharge Medications:     Current Discharge Medication List             Details   guaiFENesin (MUCINEX) 600 MG extended release tablet Take 1 tablet by mouth 2 times daily for 7 days  Qty: 14 tablet, Refills: 0      predniSONE (DELTASONE) 20 MG tablet Take 2 tablets by mouth daily for 2 days, THEN 1.5 tablets daily for 2 days, THEN 1 tablet daily for 2 days.  Qty: 9 tablet, Refills: 0                Details   escitalopram (LEXAPRO) 20 MG tablet Take 1 tablet by mouth daily      albuterol sulfate HFA (PROVENTIL;VENTOLIN;PROAIR) 108 (90 Base) MCG/ACT inhaler INHALE 2 PUFFS INTO THE LUNGS EVERY 6 HOURS AS NEEDED FOR WHEEZING  Qty: 54 g, Refills: 3    Associated Diagnoses: COPD, severe (HCC)      fluticasone-salmeterol (ADVAIR HFA) 230-21 MCG/ACT inhaler Inhale 2 puffs into the lungs 2 times daily  Qty: 3 each, Refills: 3    Associated Diagnoses: COPD, severe (HCC)      SPIRIVA RESPIMAT 2.5 MCG/ACT AERS inhaler INHALE 2 PUFFS INTO THE LUNGS DAILY  Qty: 4 g, Refills: 5      albuterol (PROVENTIL) (2.5 MG/3ML) 0.083% nebulizer solution Take 3 mLs by nebulization every 6 hours as needed for Wheezing Dx: COPD   ICD-10: J44.9  Qty: 120 each, Refills: 0      amitriptyline (ELAVIL) 25 MG tablet Take 1 tablet by mouth nightly           The patient was seen and examined on day of discharge and this discharge summary is in conjunction with any daily progress note from day of discharge.Time Spent on discharge is 45 minutes  in the examination, evaluation, counseling and review of medications and discharge plan.      Note that greater  than 30 minutes was spent in preparing discharge papers, discussing discharge with patient, medication review, etc.     Signed:    CAROLINE MANSFIELD MD   3/8/2025      Thank you Haim Malagon DO for the opportunity to be involved in this patient's  care. If you have any questions or concerns please feel free to contact me at [phone].    Please note that some part of this chart was generated using Dragon dictation software. Although every effort was made to ensure the accuracy of this automated transcription, some errors in transcription may have occurred inadvertently. If you may need any clarification, please do not hesitate to contact me through EPIC.

## 2025-03-08 NOTE — PROGRESS NOTES
[facility] - Inpatient Rehabilitation Department   Phone: [phone]    Occupational Therapy    [] Initial Evaluation            [x] Daily Treatment Note         [] Discharge Summary      Patient: Neena Mejia   : 1960   MRN: [id number]   Date of Service:  3/8/2025    Admitting Diagnosis:  COPD exacerbation (HCC)  Current Admission Summary: 64 y.o. female with a history of hypertension, COPD, former tobacco abuse, diabetes and anxiety who presents to the emergency department today complaining of shortness of breath, coughing and wheezing that worsened yesterday.  Patient was just admitted at this facility 2025 through 2025 for COPD exacerbation, pneumonia and recent influenza A.  Patient states she was feeling better at time of discharge.  Patient states she was discharged with steroids been on antibiotics.  She states she has not been doing her home nebulizer treatments as they make her feel very anxious.  She arrives on CPAP via paramedics and is placed on BiPAP.  Patient denies chest pain, heart palpitations, diaphoresis, lightheadedness or dizziness.  She denies fever or chills and is nonfebrile here.   Past Medical History:  has a past medical history of COPD (chronic obstructive pulmonary disease) (HCC).  Past Surgical History:  has a past surgical history that includes Tubal ligation;  section; Bronchoscopy (3/5/2025); bronchoscopy (N/A, 3/5/2025); and bronchoscopy (N/A, 3/5/2025).    Discharge Recommendations: Neena Mejia scored a 21/24 on the AM-PAC ADL Inpatient form. Current research shows that an AM-PAC score of 18 or greater is typically associated with a discharge to the patient's home setting. Based on the patient's AM-PAC score, and their current ADL deficits, it is recommended that the patient have 2-3 sessions per week of Occupational Therapy at d/c to increase the patient's independence.  At this time, this patient demonstrates the endurance and  with above deficits and is below baseline. At baseline, pt mod I with use of rollator and IND with IADLs. In today's session, pt requires CGA for toilet transfer, SBA for all other mobility with RW. Pt would continue to benefit from OT services to promote return to PLOF.   Safety Interventions: patient left in chair, chair alarm in place, call light within reach, gait belt, patient at risk for falls, and nurse notified    Plan  Frequency: 3-5 x/per week  Current Treatment Recommendations: balance training, functional mobility training, transfer training, endurance training, patient/caregiver education, ADL/self-care training, and home exercise program    Goals  Patient Goals: to return home    Short Term Goals:  Time Frame: by discharge  Patient will complete upper body ADL at stand by assistance   Patient will complete lower body ADL at supervision   Patient will complete toileting at contact guard assistance   Patient will complete functional transfers at supervision   Patient will complete functional mobility at supervision     Above goals reviewed on 3/8/2025.  All goals are ongoing at this time unless indicated above.       Therapy Session Time     Individual Group Co-treatment   Time In    1456   Time Out    1508   Minutes    12        Timed Code Treatment Minutes:   0  Total Treatment Minutes:  12       Electronically Signed By: Lamine Pereira, OT

## 2025-03-08 NOTE — PROGRESS NOTES
PULMONARY AND CRITICAL CARE MEDICINE PROGRESS NOTE      SUBJECTIVE: Patient is sitting comfortably in the bed.  Overnight pulse oximetry did not show desaturation and patient to baseline O2 at 2 L/min    REVIEW OF SYSTEMS:   CONSTITUTIONAL SYMPTOMS: The patient denies fever, fatigue, night sweats, weight loss or weight gain.   HEENT: No vision changes. No tinnitus, Denies sinus pain. No hoarseness, or dysphagia.   CARDIOVASCULAR: Denies chest pain, palpitation, syncope.  RESPIRATORY: Denies shortness of breath or cough.   GASTROINTESTINAL: Denies nausea, abdominal pain or change in bowel function.  SKIN: No rashes or itching.   MUSCULOSKELETAL: Denies weakness or bone pain.   NEUROLOGICAL: No headaches or seizures.     MEDICATIONS:      ipratropium  0.5 mg Nebulization TID RT    levalbuterol  1.25 mg Nebulization TID RT    guaiFENesin  600 mg Oral BID    amitriptyline  25 mg Oral Nightly    escitalopram  20 mg Oral Daily    sodium chloride flush  5-40 mL IntraVENous 2 times per day    enoxaparin  30 mg SubCUTAneous Daily    predniSONE  40 mg Oral Daily    budesonide  0.5 mg Nebulization BID RT    arformoterol tartrate  15 mcg Nebulization BID RT      sodium chloride       ALPRAZolam, levalbuterol, sodium chloride flush, sodium chloride, ondansetron **OR** ondansetron, polyethylene glycol, acetaminophen **OR** acetaminophen, albuterol, ipratropium 0.5 mg-albuterol 2.5 mg     ALLERGIES:   Allergies as of 03/05/2025    (No Known Allergies)        OBJECTIVE:   height is 1.6 m (5' 3\") and weight is 48.8 kg (107 lb 9.4 oz). Her oral temperature is 98.4 °F (36.9 °C). Her blood pressure is 151/69 (abnormal) and her pulse is 92. Her respiration is 16 and oxygen saturation is 94%.   No intake/output data recorded.     PHYSICAL EXAM:  CONSTITUTIONAL: She is a 64 y.o.-year-old who appears her stated age. She is alert and oriented x 3 and in no acute distress.   CARDIOVASCULAR: S1 S2 RRR. Without murmer  RESPIRATORY & CHEST:  worsening hypoxia, COPD exacerbation and mucous plugging.  Underwent bronchoscopy on 3/5 with removal of extensive mucous plugs from bronchus intermedius and distal airways.  Continue incentive spirometry and Acapella every 4 hours.  Continue MetaNebs every 4 hours.  Mucinex twice daily  BiPAP at night.  Overnight pulse oximetry did not show any desaturation at patient's baseline O2 at 3 days/min     Patient has severe COPD.  Continue Xopenex and Atrovent nebs.  Continue Pulmicort and Browner nebs.  Continue prednisone prednisone with taper  BAL culture negative.  Discontinue Levaquin  Wean FiO2 as tolerated for saturation of 88 to 92%.    Patient can be discharged from pulmonary perspective on home inhalers.        Nargis Kurtz MD  Pulmonary Critical Care and Sleep Medicine  3/8/2025, 12:05 PM    This note was completed using dragon medical speech recognition software. Grammatical errors, random word insertions, pronoun errors and incomplete sentences are occasional consequences of this technology due to software limitations. If there are questions or concerns about the content of this note of information contained within the body of this dictation they should be addressed with the provider for clarification.

## 2025-03-08 NOTE — PROGRESS NOTES
[facility] - Inpatient Rehabilitation Department   Phone: [phone]    Physical Therapy    [] Initial Evaluation            [x] Daily Treatment Note         [] Discharge Summary      Patient: Neena Mejia   : 1960   MRN: [id number]   Date of Service:  3/8/2025  Admitting Diagnosis: COPD exacerbation (HCC)  Current Admission Summary: Per MD note, Neena Mejia is a 64 y.o. female  with PMHx of chronic hypoxic respiratory failure;  2 to 3 L NC at baseline, severe COPD, prior hx of tobacco abuse who presented with worsening SOB. Of note, pt was recently admitted at [facility] for influenza A and pneumonia; treated with antibiotics and Tamiflu and was discharged home. Patient was readmitted at [facility] within 2 days with COPD exacerbation/acute on chronic hypoxic & hypercarbic respiratory failure; treated with antibiotics& steroids and discharged home.  Per patient report, she has been experiencing progressively worsening SOB, cough and wheezing since yesterday.  Patient denied any fever, chills, chest pain, palpitations, dizziness, bowel or bladder dysfunction.  Patient had arrived to the ED via EMS on CPAP; placed BiPAP in the ED.  Initial lab work showed WBCs: 12.7 K, bicarb 41, CT chest on admission showed extensive mucous plugging and right sided airways.   Past Medical History:  has a past medical history of COPD (chronic obstructive pulmonary disease) (HCC).  Past Surgical History:  has a past surgical history that includes Tubal ligation;  section; Bronchoscopy (3/5/2025); bronchoscopy (N/A, 3/5/2025); and bronchoscopy (N/A, 3/5/2025).  Discharge Recommendations: Neena Mejia scored a 19/24 on the AM-PAC short mobility form. Current research shows that an AM-PAC score of 18 or greater is typically associated with a discharge to the patient's home setting. Based on the patient's AM-PAC score and their current functional mobility deficits, it is recommended that  Treatment Minutes: 12 Minutes       Electronically Signed By: Tigist Woods, PT  Tigist Woods PT, DPT [id number]

## 2025-03-08 NOTE — DISCHARGE INSTRUCTIONS
Follow up with your PCP within 5- 7 days of discharge.  Follow up with Pulmonology as instructed   Make appointment with gynecology for evaluation of vaginal bleeding.  Pelvic US showed uterine fibroids  Take all your medications as prescribed.

## 2025-03-13 ENCOUNTER — OFFICE VISIT (OUTPATIENT)
Dept: PULMONOLOGY | Age: 65
End: 2025-03-13
Payer: MEDICARE

## 2025-03-13 ENCOUNTER — TELEPHONE (OUTPATIENT)
Dept: INTERNAL MEDICINE CLINIC | Age: 65
End: 2025-03-13

## 2025-03-13 VITALS
BODY MASS INDEX: 18.96 KG/M2 | HEART RATE: 105 BPM | SYSTOLIC BLOOD PRESSURE: 118 MMHG | HEIGHT: 63 IN | DIASTOLIC BLOOD PRESSURE: 78 MMHG | OXYGEN SATURATION: 97 % | WEIGHT: 107 LBS

## 2025-03-13 DIAGNOSIS — J44.9 COPD, SEVERE (HCC): Primary | ICD-10-CM

## 2025-03-13 DIAGNOSIS — J96.11 CHRONIC RESPIRATORY FAILURE WITH HYPOXIA (HCC): ICD-10-CM

## 2025-03-13 DIAGNOSIS — Z87.891 PERSONAL HISTORY OF TOBACCO USE: ICD-10-CM

## 2025-03-13 PROCEDURE — 3023F SPIROM DOC REV: CPT | Performed by: INTERNAL MEDICINE

## 2025-03-13 PROCEDURE — 1111F DSCHRG MED/CURRENT MED MERGE: CPT | Performed by: INTERNAL MEDICINE

## 2025-03-13 PROCEDURE — G8427 DOCREV CUR MEDS BY ELIG CLIN: HCPCS | Performed by: INTERNAL MEDICINE

## 2025-03-13 PROCEDURE — 3074F SYST BP LT 130 MM HG: CPT | Performed by: INTERNAL MEDICINE

## 2025-03-13 PROCEDURE — 3017F COLORECTAL CA SCREEN DOC REV: CPT | Performed by: INTERNAL MEDICINE

## 2025-03-13 PROCEDURE — 1036F TOBACCO NON-USER: CPT | Performed by: INTERNAL MEDICINE

## 2025-03-13 PROCEDURE — G2211 COMPLEX E/M VISIT ADD ON: HCPCS | Performed by: INTERNAL MEDICINE

## 2025-03-13 PROCEDURE — 99215 OFFICE O/P EST HI 40 MIN: CPT | Performed by: INTERNAL MEDICINE

## 2025-03-13 PROCEDURE — G8420 CALC BMI NORM PARAMETERS: HCPCS | Performed by: INTERNAL MEDICINE

## 2025-03-13 PROCEDURE — G0296 VISIT TO DETERM LDCT ELIG: HCPCS | Performed by: INTERNAL MEDICINE

## 2025-03-13 PROCEDURE — 3078F DIAST BP <80 MM HG: CPT | Performed by: INTERNAL MEDICINE

## 2025-03-13 RX ORDER — AZITHROMYCIN 250 MG/1
250 TABLET, FILM COATED ORAL
Qty: 12 TABLET | Refills: 3 | Status: SHIPPED | OUTPATIENT
Start: 2025-03-14

## 2025-03-13 RX ORDER — FLUTICASONE FUROATE, UMECLIDINIUM BROMIDE AND VILANTEROL TRIFENATATE 100; 62.5; 25 UG/1; UG/1; UG/1
1 POWDER RESPIRATORY (INHALATION) DAILY
COMMUNITY
Start: 2025-03-06

## 2025-03-13 NOTE — TELEPHONE ENCOUNTER
Left message to call office at [phone] to establish New Patient appointment.    [facility]  [address], [place] [address]   [phone]

## 2025-03-13 NOTE — PROGRESS NOTES
PULMONARY OFFICE FOLLOW UP NOTE    REASON FOR VISIT:   Chief Complaint   Patient presents with    COPD    Follow-Up from Hospital     States her whole body continuously hurts. Today is a bad day. States her breathing is not good. States nebulizer isn't working. States she needs a new one.       DATE OF VISIT: 3/13/2025    HISTORY OF PRESENT ILLNESS: 64 y.o. year old female is here for follow-up of severe COPD, FEV1 25%    Patient was hospitalized back-to-back over the last 2 to 3 months.  While she was hospitalized for influenza A and then hospitalized for COPD exacerbation with mucous plugging.  She underwent bronchoscopy and was noted to have mucous plugging and bronchus intermedius.    Today she comes for follow-up in a wheelchair.  She is very weak and deconditioned.  She has shortness of breath along with cough.  No wheezing or chest pain or hemoptysis.  Currently using oxygen at 3 L/min.  She needs a nebulizer.    She states that she is in pain all over.  She wants to get evaluated for pain.  She also has anxiety issues which are uncontrolled.  She was to get established with a new primary care.    Her bronchodilator regimen consist of Trelegy Ellipta 1 puff daily and Xopenex nebs.     Patient has significant smoking history of at least 43 pack years.  She quit smoking in 2022.     DIAGNOSTIC TEST REVIEWED:  I reviewed the PFT from 12/18/2020 and my interpretation is as follows.  Severe obstructive airway disease with hyperinflation and reduced diffusion capacity.  FEV1/FVC 33  FEV1 25%, 0.62 L  FVC 59%, 1.8 L  %, 14.86 L  DLCO 46%     I reviewed that low-dose CT chest performed on December 2022 and my interpretation is as follows.  No PE noted.  Bilateral emphysema noted.  I reviewed the CT lung screen from 8/2/2024 and my interpretation is as follows.  No abnormal lung nodules noted.  Bilateral emphysema noted.        REVIEW OF SYSTEMS: 14 point ROS is negative beside mentioned in [place].

## 2025-03-17 LAB
FUNGUS SPEC CULT: NORMAL
LOEFFLER MB STN SPEC: NORMAL

## 2025-03-24 LAB
FUNGUS SPEC CULT: NORMAL
LOEFFLER MB STN SPEC: NORMAL

## 2025-03-31 LAB
FUNGUS SPEC CULT: NORMAL
LOEFFLER MB STN SPEC: NORMAL

## 2025-04-07 LAB
FUNGUS SPEC CULT: NORMAL
LOEFFLER MB STN SPEC: NORMAL

## 2025-04-10 ENCOUNTER — HOSPITAL ENCOUNTER (INPATIENT)
Age: 65
LOS: 8 days | Discharge: HOME | DRG: 190 | End: 2025-04-18
Attending: INTERNAL MEDICINE
Payer: MEDICARE

## 2025-04-10 ENCOUNTER — APPOINTMENT (OUTPATIENT)
Dept: CT IMAGING | Age: 65
DRG: 190 | End: 2025-04-10
Payer: MEDICARE

## 2025-04-10 ENCOUNTER — APPOINTMENT (OUTPATIENT)
Dept: GENERAL RADIOLOGY | Age: 65
DRG: 190 | End: 2025-04-10
Payer: MEDICARE

## 2025-04-10 DIAGNOSIS — I25.10 CORONARY ARTERY DISEASE INVOLVING NATIVE CORONARY ARTERY OF NATIVE HEART WITHOUT ANGINA PECTORIS: ICD-10-CM

## 2025-04-10 DIAGNOSIS — J96.01 ACUTE RESPIRATORY FAILURE WITH HYPOXIA AND HYPERCAPNIA (HCC): Primary | ICD-10-CM

## 2025-04-10 DIAGNOSIS — R06.02 SHORTNESS OF BREATH: ICD-10-CM

## 2025-04-10 DIAGNOSIS — R06.02 SOB (SHORTNESS OF BREATH): ICD-10-CM

## 2025-04-10 DIAGNOSIS — F41.9 ANXIETY: ICD-10-CM

## 2025-04-10 DIAGNOSIS — R07.9 CHEST PAIN: ICD-10-CM

## 2025-04-10 DIAGNOSIS — J96.02 ACUTE RESPIRATORY FAILURE WITH HYPOXIA AND HYPERCAPNIA (HCC): Primary | ICD-10-CM

## 2025-04-10 DIAGNOSIS — R00.0 WIDE-COMPLEX TACHYCARDIA: ICD-10-CM

## 2025-04-10 DIAGNOSIS — I42.9 CARDIOMYOPATHY (HCC): ICD-10-CM

## 2025-04-10 DIAGNOSIS — J44.1 COPD EXACERBATION (HCC): ICD-10-CM

## 2025-04-10 LAB
ANION GAP SERPL CALCULATED.3IONS-SCNC: 8 MMOL/L (ref 3–16)
BASE EXCESS BLDV CALC-SCNC: 13.5 MMOL/L (ref -3–3)
BASOPHILS # BLD: 0 K/UL (ref 0–0.2)
BASOPHILS NFR BLD: 0.5 %
BUN SERPL-MCNC: 7 MG/DL (ref 7–20)
CALCIUM SERPL-MCNC: 9.8 MG/DL (ref 8.3–10.6)
CHLORIDE SERPL-SCNC: 92 MMOL/L (ref 99–110)
CO2 BLDV-SCNC: 102 MMOL/L
CO2 SERPL-SCNC: 37 MMOL/L (ref 21–32)
COHGB MFR BLDV: 2.6 % (ref 0–1.5)
CREAT SERPL-MCNC: 0.3 MG/DL (ref 0.6–1.2)
DEPRECATED RDW RBC AUTO: 13.3 % (ref 12.4–15.4)
EOSINOPHIL # BLD: 0.1 K/UL (ref 0–0.6)
EOSINOPHIL NFR BLD: 1.6 %
FLUAV RNA RESP QL NAA+PROBE: NOT DETECTED
FLUBV RNA RESP QL NAA+PROBE: NOT DETECTED
GFR SERPLBLD CREATININE-BSD FMLA CKD-EPI: >90 ML/MIN/{1.73_M2}
GLUCOSE SERPL-MCNC: 133 MG/DL (ref 70–99)
HCO3 BLDV-SCNC: 43.1 MMOL/L (ref 23–29)
HCT VFR BLD AUTO: 41.8 % (ref 36–48)
HGB BLD-MCNC: 14.1 G/DL (ref 12–16)
LYMPHOCYTES # BLD: 1.7 K/UL (ref 1–5.1)
LYMPHOCYTES NFR BLD: 24.4 %
MCH RBC QN AUTO: 29.4 PG (ref 26–34)
MCHC RBC AUTO-ENTMCNC: 33.7 G/DL (ref 31–36)
MCV RBC AUTO: 87.3 FL (ref 80–100)
METHGB MFR BLDV: 0.1 %
MONOCYTES # BLD: 0.6 K/UL (ref 0–1.3)
MONOCYTES NFR BLD: 8.8 %
NEUTROPHILS # BLD: 4.5 K/UL (ref 1.7–7.7)
NEUTROPHILS NFR BLD: 64.7 %
NT-PROBNP SERPL-MCNC: 54 PG/ML (ref 0–124)
O2 CT VFR BLDV CALC: 20 VOL %
O2 THERAPY: ABNORMAL
PCO2 BLDV: 78.9 MMHG (ref 40–50)
PH BLDV: 7.35 [PH] (ref 7.35–7.45)
PLATELET # BLD AUTO: 343 K/UL (ref 135–450)
PMV BLD AUTO: 7.1 FL (ref 5–10.5)
PO2 BLDV: 246 MMHG (ref 25–40)
POTASSIUM SERPL-SCNC: 4.3 MMOL/L (ref 3.5–5.1)
POTASSIUM SERPL-SCNC: ABNORMAL MMOL/L (ref 3.5–5.1)
PROCALCITONIN SERPL IA-MCNC: 0.07 NG/ML (ref 0–0.15)
RBC # BLD AUTO: 4.79 M/UL (ref 4–5.2)
REASON FOR REJECTION: NORMAL
REJECTED TEST: NORMAL
SAO2 % BLDV: 100 %
SARS-COV-2 RNA RESP QL NAA+PROBE: NOT DETECTED
SODIUM SERPL-SCNC: 137 MMOL/L (ref 136–145)
TROPONIN, HIGH SENSITIVITY: 14 NG/L (ref 0–14)
WBC # BLD AUTO: 7 K/UL (ref 4–11)

## 2025-04-10 PROCEDURE — 6370000000 HC RX 637 (ALT 250 FOR IP)

## 2025-04-10 PROCEDURE — 85025 COMPLETE CBC W/AUTO DIFF WBC: CPT

## 2025-04-10 PROCEDURE — 94660 CPAP INITIATION&MGMT: CPT

## 2025-04-10 PROCEDURE — 6360000004 HC RX CONTRAST MEDICATION: Performed by: INTERNAL MEDICINE

## 2025-04-10 PROCEDURE — 6360000002 HC RX W HCPCS

## 2025-04-10 PROCEDURE — 83880 ASSAY OF NATRIURETIC PEPTIDE: CPT

## 2025-04-10 PROCEDURE — 2700000000 HC OXYGEN THERAPY PER DAY

## 2025-04-10 PROCEDURE — 87636 SARSCOV2 & INF A&B AMP PRB: CPT

## 2025-04-10 PROCEDURE — 6360000002 HC RX W HCPCS: Performed by: INTERNAL MEDICINE

## 2025-04-10 PROCEDURE — 99285 EMERGENCY DEPT VISIT HI MDM: CPT

## 2025-04-10 PROCEDURE — 2580000003 HC RX 258: Performed by: INTERNAL MEDICINE

## 2025-04-10 PROCEDURE — 71045 X-RAY EXAM CHEST 1 VIEW: CPT

## 2025-04-10 PROCEDURE — 5A09457 ASSISTANCE WITH RESPIRATORY VENTILATION, 24-96 CONSECUTIVE HOURS, CONTINUOUS POSITIVE AIRWAY PRESSURE: ICD-10-PCS | Performed by: INTERNAL MEDICINE

## 2025-04-10 PROCEDURE — 71260 CT THORAX DX C+: CPT

## 2025-04-10 PROCEDURE — 80048 BASIC METABOLIC PNL TOTAL CA: CPT

## 2025-04-10 PROCEDURE — 84484 ASSAY OF TROPONIN QUANT: CPT

## 2025-04-10 PROCEDURE — 2500000003 HC RX 250 WO HCPCS: Performed by: INTERNAL MEDICINE

## 2025-04-10 PROCEDURE — 94761 N-INVAS EAR/PLS OXIMETRY MLT: CPT

## 2025-04-10 PROCEDURE — 82803 BLOOD GASES ANY COMBINATION: CPT

## 2025-04-10 PROCEDURE — 84132 ASSAY OF SERUM POTASSIUM: CPT

## 2025-04-10 PROCEDURE — 94640 AIRWAY INHALATION TREATMENT: CPT

## 2025-04-10 PROCEDURE — 36415 COLL VENOUS BLD VENIPUNCTURE: CPT

## 2025-04-10 PROCEDURE — 96375 TX/PRO/DX INJ NEW DRUG ADDON: CPT

## 2025-04-10 PROCEDURE — 93005 ELECTROCARDIOGRAM TRACING: CPT | Performed by: INTERNAL MEDICINE

## 2025-04-10 PROCEDURE — 96365 THER/PROPH/DIAG IV INF INIT: CPT

## 2025-04-10 PROCEDURE — 6370000000 HC RX 637 (ALT 250 FOR IP): Performed by: INTERNAL MEDICINE

## 2025-04-10 PROCEDURE — 2060000000 HC ICU INTERMEDIATE R&B

## 2025-04-10 PROCEDURE — 84145 PROCALCITONIN (PCT): CPT

## 2025-04-10 RX ORDER — BUDESONIDE AND FORMOTEROL FUMARATE DIHYDRATE 160; 4.5 UG/1; UG/1
2 AEROSOL RESPIRATORY (INHALATION)
Status: DISCONTINUED | OUTPATIENT
Start: 2025-04-10 | End: 2025-04-11

## 2025-04-10 RX ORDER — BUDESONIDE AND FORMOTEROL FUMARATE DIHYDRATE 160; 4.5 UG/1; UG/1
2 AEROSOL RESPIRATORY (INHALATION)
Status: DISCONTINUED | OUTPATIENT
Start: 2025-04-10 | End: 2025-04-10

## 2025-04-10 RX ORDER — IPRATROPIUM BROMIDE AND ALBUTEROL SULFATE 2.5; .5 MG/3ML; MG/3ML
1 SOLUTION RESPIRATORY (INHALATION)
Status: DISCONTINUED | OUTPATIENT
Start: 2025-04-10 | End: 2025-04-11

## 2025-04-10 RX ORDER — IPRATROPIUM BROMIDE AND ALBUTEROL SULFATE 2.5; .5 MG/3ML; MG/3ML
1 SOLUTION RESPIRATORY (INHALATION)
Status: DISCONTINUED | OUTPATIENT
Start: 2025-04-10 | End: 2025-04-12

## 2025-04-10 RX ORDER — DIPHENHYDRAMINE HYDROCHLORIDE 50 MG/ML
50 INJECTION, SOLUTION INTRAMUSCULAR; INTRAVENOUS
Status: COMPLETED | OUTPATIENT
Start: 2025-04-10 | End: 2025-04-10

## 2025-04-10 RX ORDER — ALBUTEROL SULFATE 0.83 MG/ML
2.5 SOLUTION RESPIRATORY (INHALATION) EVERY 6 HOURS PRN
Status: DISCONTINUED | OUTPATIENT
Start: 2025-04-10 | End: 2025-04-18 | Stop reason: HOSPADM

## 2025-04-10 RX ORDER — BENZONATATE 100 MG/1
100 CAPSULE ORAL 3 TIMES DAILY PRN
Status: DISCONTINUED | OUTPATIENT
Start: 2025-04-10 | End: 2025-04-18 | Stop reason: HOSPADM

## 2025-04-10 RX ORDER — POLYETHYLENE GLYCOL 3350 17 G/17G
17 POWDER, FOR SOLUTION ORAL DAILY PRN
Status: DISCONTINUED | OUTPATIENT
Start: 2025-04-10 | End: 2025-04-18 | Stop reason: HOSPADM

## 2025-04-10 RX ORDER — ACETAMINOPHEN 650 MG/1
650 SUPPOSITORY RECTAL EVERY 6 HOURS PRN
Status: DISCONTINUED | OUTPATIENT
Start: 2025-04-10 | End: 2025-04-18 | Stop reason: HOSPADM

## 2025-04-10 RX ORDER — AZITHROMYCIN MONOHYDRATE 500 MG/5ML
500 INJECTION, POWDER, LYOPHILIZED, FOR SOLUTION INTRAVENOUS ONCE
Status: DISCONTINUED | OUTPATIENT
Start: 2025-04-10 | End: 2025-04-10

## 2025-04-10 RX ORDER — ESCITALOPRAM OXALATE 10 MG/1
20 TABLET ORAL DAILY
Status: DISCONTINUED | OUTPATIENT
Start: 2025-04-11 | End: 2025-04-18 | Stop reason: HOSPADM

## 2025-04-10 RX ORDER — SODIUM CHLORIDE 9 MG/ML
INJECTION, SOLUTION INTRAVENOUS PRN
Status: DISCONTINUED | OUTPATIENT
Start: 2025-04-10 | End: 2025-04-18 | Stop reason: HOSPADM

## 2025-04-10 RX ORDER — SODIUM CHLORIDE 0.9 % (FLUSH) 0.9 %
5-40 SYRINGE (ML) INJECTION EVERY 12 HOURS SCHEDULED
Status: DISCONTINUED | OUTPATIENT
Start: 2025-04-10 | End: 2025-04-18 | Stop reason: HOSPADM

## 2025-04-10 RX ORDER — PREDNISONE 20 MG/1
40 TABLET ORAL DAILY
Status: DISCONTINUED | OUTPATIENT
Start: 2025-04-13 | End: 2025-04-12

## 2025-04-10 RX ORDER — ONDANSETRON 4 MG/1
4 TABLET, ORALLY DISINTEGRATING ORAL EVERY 8 HOURS PRN
Status: DISCONTINUED | OUTPATIENT
Start: 2025-04-10 | End: 2025-04-18 | Stop reason: HOSPADM

## 2025-04-10 RX ORDER — SODIUM CHLORIDE 0.9 % (FLUSH) 0.9 %
5-40 SYRINGE (ML) INJECTION PRN
Status: DISCONTINUED | OUTPATIENT
Start: 2025-04-10 | End: 2025-04-18 | Stop reason: HOSPADM

## 2025-04-10 RX ORDER — ENOXAPARIN SODIUM 100 MG/ML
40 INJECTION SUBCUTANEOUS DAILY
Status: DISCONTINUED | OUTPATIENT
Start: 2025-04-11 | End: 2025-04-18 | Stop reason: HOSPADM

## 2025-04-10 RX ORDER — GUAIFENESIN/DEXTROMETHORPHAN 100-10MG/5
5 SYRUP ORAL EVERY 4 HOURS PRN
Status: DISCONTINUED | OUTPATIENT
Start: 2025-04-10 | End: 2025-04-18 | Stop reason: HOSPADM

## 2025-04-10 RX ORDER — ACETAMINOPHEN 325 MG/1
650 TABLET ORAL EVERY 6 HOURS PRN
Status: DISCONTINUED | OUTPATIENT
Start: 2025-04-10 | End: 2025-04-16

## 2025-04-10 RX ORDER — ALPRAZOLAM 0.5 MG
0.5 TABLET ORAL 2 TIMES DAILY PRN
Status: ON HOLD | COMMUNITY
End: 2025-04-18

## 2025-04-10 RX ORDER — ONDANSETRON 2 MG/ML
4 INJECTION INTRAMUSCULAR; INTRAVENOUS EVERY 6 HOURS PRN
Status: DISCONTINUED | OUTPATIENT
Start: 2025-04-10 | End: 2025-04-18 | Stop reason: HOSPADM

## 2025-04-10 RX ORDER — IOPAMIDOL 755 MG/ML
75 INJECTION, SOLUTION INTRAVASCULAR
Status: COMPLETED | OUTPATIENT
Start: 2025-04-10 | End: 2025-04-10

## 2025-04-10 RX ORDER — IPRATROPIUM BROMIDE AND ALBUTEROL SULFATE 2.5; .5 MG/3ML; MG/3ML
1 SOLUTION RESPIRATORY (INHALATION)
Status: DISCONTINUED | OUTPATIENT
Start: 2025-04-11 | End: 2025-04-10

## 2025-04-10 RX ORDER — IPRATROPIUM BROMIDE AND ALBUTEROL SULFATE 2.5; .5 MG/3ML; MG/3ML
2 SOLUTION RESPIRATORY (INHALATION) ONCE
Status: DISCONTINUED | OUTPATIENT
Start: 2025-04-10 | End: 2025-04-11

## 2025-04-10 RX ORDER — AZITHROMYCIN 250 MG/1
500 TABLET, FILM COATED ORAL DAILY
Status: DISCONTINUED | OUTPATIENT
Start: 2025-04-11 | End: 2025-04-11

## 2025-04-10 RX ADMIN — IPRATROPIUM BROMIDE AND ALBUTEROL SULFATE 1 DOSE: .5; 3 SOLUTION RESPIRATORY (INHALATION) at 19:53

## 2025-04-10 RX ADMIN — DIPHENHYDRAMINE HYDROCHLORIDE 50 MG: 50 INJECTION INTRAMUSCULAR; INTRAVENOUS at 22:33

## 2025-04-10 RX ADMIN — AMITRIPTYLINE HYDROCHLORIDE 25 MG: 25 TABLET ORAL at 22:34

## 2025-04-10 RX ADMIN — WATER 1000 MG: 1 INJECTION INTRAMUSCULAR; INTRAVENOUS; SUBCUTANEOUS at 18:45

## 2025-04-10 RX ADMIN — IOPAMIDOL 75 ML: 755 INJECTION, SOLUTION INTRAVENOUS at 19:44

## 2025-04-10 RX ADMIN — IPRATROPIUM BROMIDE AND ALBUTEROL SULFATE 1 DOSE: .5; 3 SOLUTION RESPIRATORY (INHALATION) at 16:58

## 2025-04-10 RX ADMIN — AZITHROMYCIN MONOHYDRATE 500 MG: 500 INJECTION, POWDER, LYOPHILIZED, FOR SOLUTION INTRAVENOUS at 18:53

## 2025-04-10 RX ADMIN — WATER 125 MG: 1 INJECTION INTRAMUSCULAR; INTRAVENOUS; SUBCUTANEOUS at 16:54

## 2025-04-10 ASSESSMENT — PAIN SCALES - GENERAL: PAINLEVEL_OUTOF10: 0

## 2025-04-10 ASSESSMENT — PAIN - FUNCTIONAL ASSESSMENT: PAIN_FUNCTIONAL_ASSESSMENT: 0-10

## 2025-04-10 NOTE — ED PROVIDER NOTES
EMERGENCY MEDICINE PROVIDER NOTE    Patient Identification  Pt Name: Neena Mejia  MRN: [id number]  Birthdate 1960  Date of evaluation: 4/10/2025  Provider: AHSAN DIGGS DO  PCP: Haim Malagon DO    Chief Complaint  Shortness of Breath (Ems report: pt. On CPAP, on arrival pt. On chronic 3L NC at 80%, improved with CPAP, history of COPD, pt. Reports SOB since Monday. )      HPI  (History provided by patient)  This is a 64 y.o. female who was brought in by EMS transportation for shortness of breath.  Patient is on 3 L of oxygen via nasal cannula normally.  Patient does have a history of CHF and COPD according to the patient.  Patient was placed on CPAP by EMS.  Patient was found to be 80% on her pulse ox using the 3 L via nasal cannula.  Shortness of breath did start Monday.  She denies fever and chills.  Patient denies cough.  Patient denies any nausea or vomiting.  She denies any dizziness or weakness.    I have reviewed the following nursing documentation:  Allergies: Patient has no known allergies.    Past medical history:   Past Medical History:   Diagnosis Date    COPD (chronic obstructive pulmonary disease) (HCC)      Past surgical history:   Past Surgical History:   Procedure Laterality Date    BRONCHOSCOPY  3/5/2025    BRONCHOSCOPY N/A 3/5/2025    BRONCHOSCOPY THERAPEUTIC ASPIRATION INITIAL performed by Chaz Ochoa MD at [facility] ENDOSCOPY    BRONCHOSCOPY N/A 3/5/2025    BRONCHOSCOPY ALVEOLAR LAVAGE performed by Chaz Ochoa MD at [facility] ENDOSCOPY     SECTION      TUBAL LIGATION         Home medications:   Previous Medications    ALBUTEROL (PROVENTIL) (2.5 MG/3ML) 0.083% NEBULIZER SOLUTION    Take 3 mLs by nebulization every 6 hours as needed for Wheezing Dx: COPD   ICD-10: J44.9    ALBUTEROL SULFATE HFA (PROVENTIL;VENTOLIN;PROAIR) 108 (90 BASE) MCG/ACT INHALER    INHALE 2 PUFFS INTO THE LUNGS EVERY 6 HOURS AS NEEDED FOR WHEEZING    AMITRIPTYLINE (ELAVIL) 25 MG TABLET

## 2025-04-11 LAB
ALBUMIN SERPL-MCNC: 4 G/DL (ref 3.4–5)
ALBUMIN/GLOB SERPL: 1.2 {RATIO} (ref 1.1–2.2)
ALP SERPL-CCNC: 68 U/L (ref 40–129)
ALT SERPL-CCNC: 9 U/L (ref 10–40)
ANION GAP SERPL CALCULATED.3IONS-SCNC: 9 MMOL/L (ref 3–16)
AST SERPL-CCNC: 20 U/L (ref 15–37)
BASE EXCESS BLDA CALC-SCNC: 12.7 MMOL/L (ref -3–3)
BASOPHILS # BLD: 0 K/UL (ref 0–0.2)
BASOPHILS NFR BLD: 0.2 %
BILIRUB SERPL-MCNC: <0.2 MG/DL (ref 0–1)
BUN SERPL-MCNC: 10 MG/DL (ref 7–20)
CALCIUM SERPL-MCNC: 9.9 MG/DL (ref 8.3–10.6)
CHLORIDE SERPL-SCNC: 94 MMOL/L (ref 99–110)
CO2 BLDA-SCNC: 94.5 MMOL/L
CO2 SERPL-SCNC: 37 MMOL/L (ref 21–32)
COHGB MFR BLDA: 1.4 % (ref 0–1.5)
CREAT SERPL-MCNC: 0.3 MG/DL (ref 0.6–1.2)
DEPRECATED RDW RBC AUTO: 13.7 % (ref 12.4–15.4)
EKG ATRIAL RATE: 111 BPM
EKG DIAGNOSIS: NORMAL
EKG P AXIS: 79 DEGREES
EKG P-R INTERVAL: 160 MS
EKG Q-T INTERVAL: 340 MS
EKG QRS DURATION: 112 MS
EKG QTC CALCULATION (BAZETT): 462 MS
EKG R AXIS: 76 DEGREES
EKG T AXIS: 44 DEGREES
EKG VENTRICULAR RATE: 111 BPM
EOSINOPHIL # BLD: 0 K/UL (ref 0–0.6)
EOSINOPHIL NFR BLD: 0.1 %
GFR SERPLBLD CREATININE-BSD FMLA CKD-EPI: >90 ML/MIN/{1.73_M2}
GLUCOSE SERPL-MCNC: 120 MG/DL (ref 70–99)
HCO3 BLDA-SCNC: 40.2 MMOL/L (ref 21–29)
HCT VFR BLD AUTO: 42.7 % (ref 36–48)
HGB BLD-MCNC: 14.2 G/DL (ref 12–16)
HGB BLDA-MCNC: 13.6 G/DL (ref 12–16)
LYMPHOCYTES # BLD: 0.8 K/UL (ref 1–5.1)
LYMPHOCYTES NFR BLD: 14.4 %
MCH RBC QN AUTO: 29.4 PG (ref 26–34)
MCHC RBC AUTO-ENTMCNC: 33.3 G/DL (ref 31–36)
MCV RBC AUTO: 88.2 FL (ref 80–100)
METHGB MFR BLDA: 0.8 %
MONOCYTES # BLD: 0.2 K/UL (ref 0–1.3)
MONOCYTES NFR BLD: 3.7 %
NEUTROPHILS # BLD: 4.8 K/UL (ref 1.7–7.7)
NEUTROPHILS NFR BLD: 81.6 %
O2 THERAPY: ABNORMAL
ORGANISM: ABNORMAL
PCO2 BLDA: 63.7 MMHG (ref 35–45)
PH BLDA: 7.41 [PH] (ref 7.35–7.45)
PLATELET # BLD AUTO: 313 K/UL (ref 135–450)
PMV BLD AUTO: 7.2 FL (ref 5–10.5)
PO2 BLDA: 99.5 MMHG (ref 75–108)
POTASSIUM SERPL-SCNC: 4.7 MMOL/L (ref 3.5–5.1)
PROT SERPL-MCNC: 7.4 G/DL (ref 6.4–8.2)
RBC # BLD AUTO: 4.84 M/UL (ref 4–5.2)
REPORT: NORMAL
RESP PATH DNA+RNA PNL NPH NAA+NON-PROBE: ABNORMAL
SAO2 % BLDA: 98.8 %
SODIUM SERPL-SCNC: 140 MMOL/L (ref 136–145)
WBC # BLD AUTO: 5.9 K/UL (ref 4–11)

## 2025-04-11 PROCEDURE — 6360000002 HC RX W HCPCS

## 2025-04-11 PROCEDURE — 2500000003 HC RX 250 WO HCPCS

## 2025-04-11 PROCEDURE — 36600 WITHDRAWAL OF ARTERIAL BLOOD: CPT

## 2025-04-11 PROCEDURE — 80053 COMPREHEN METABOLIC PANEL: CPT

## 2025-04-11 PROCEDURE — 6370000000 HC RX 637 (ALT 250 FOR IP)

## 2025-04-11 PROCEDURE — 99223 1ST HOSP IP/OBS HIGH 75: CPT | Performed by: STUDENT IN AN ORGANIZED HEALTH CARE EDUCATION/TRAINING PROGRAM

## 2025-04-11 PROCEDURE — 2060000000 HC ICU INTERMEDIATE R&B

## 2025-04-11 PROCEDURE — 94660 CPAP INITIATION&MGMT: CPT

## 2025-04-11 PROCEDURE — 36415 COLL VENOUS BLD VENIPUNCTURE: CPT

## 2025-04-11 PROCEDURE — 94640 AIRWAY INHALATION TREATMENT: CPT

## 2025-04-11 PROCEDURE — 6360000002 HC RX W HCPCS: Performed by: STUDENT IN AN ORGANIZED HEALTH CARE EDUCATION/TRAINING PROGRAM

## 2025-04-11 PROCEDURE — 85025 COMPLETE CBC W/AUTO DIFF WBC: CPT

## 2025-04-11 PROCEDURE — 82803 BLOOD GASES ANY COMBINATION: CPT

## 2025-04-11 PROCEDURE — 94761 N-INVAS EAR/PLS OXIMETRY MLT: CPT

## 2025-04-11 PROCEDURE — 0202U NFCT DS 22 TRGT SARS-COV-2: CPT

## 2025-04-11 PROCEDURE — 6370000000 HC RX 637 (ALT 250 FOR IP): Performed by: NURSE PRACTITIONER

## 2025-04-11 PROCEDURE — 2700000000 HC OXYGEN THERAPY PER DAY

## 2025-04-11 PROCEDURE — 93010 ELECTROCARDIOGRAM REPORT: CPT | Performed by: INTERNAL MEDICINE

## 2025-04-11 RX ORDER — ALPRAZOLAM 0.5 MG
0.5 TABLET ORAL 3 TIMES DAILY PRN
Status: DISCONTINUED | OUTPATIENT
Start: 2025-04-11 | End: 2025-04-12

## 2025-04-11 RX ORDER — AZITHROMYCIN 250 MG/1
500 TABLET, FILM COATED ORAL DAILY
Status: COMPLETED | OUTPATIENT
Start: 2025-04-12 | End: 2025-04-13

## 2025-04-11 RX ORDER — ARFORMOTEROL TARTRATE 15 UG/2ML
15 SOLUTION RESPIRATORY (INHALATION)
Status: DISCONTINUED | OUTPATIENT
Start: 2025-04-11 | End: 2025-04-18 | Stop reason: HOSPADM

## 2025-04-11 RX ORDER — BUDESONIDE 0.5 MG/2ML
0.5 INHALANT ORAL
Status: DISCONTINUED | OUTPATIENT
Start: 2025-04-11 | End: 2025-04-18 | Stop reason: HOSPADM

## 2025-04-11 RX ADMIN — IPRATROPIUM BROMIDE AND ALBUTEROL SULFATE 1 DOSE: .5; 3 SOLUTION RESPIRATORY (INHALATION) at 10:54

## 2025-04-11 RX ADMIN — ALPRAZOLAM 0.5 MG: 0.5 TABLET ORAL at 10:06

## 2025-04-11 RX ADMIN — Medication 10 ML: at 09:10

## 2025-04-11 RX ADMIN — Medication 10 ML: at 21:32

## 2025-04-11 RX ADMIN — ALPRAZOLAM 0.5 MG: 0.5 TABLET ORAL at 21:31

## 2025-04-11 RX ADMIN — IPRATROPIUM BROMIDE AND ALBUTEROL SULFATE 1 DOSE: .5; 3 SOLUTION RESPIRATORY (INHALATION) at 07:35

## 2025-04-11 RX ADMIN — ARFORMOTEROL TARTRATE 15 MCG: 15 SOLUTION RESPIRATORY (INHALATION) at 20:06

## 2025-04-11 RX ADMIN — AMITRIPTYLINE HYDROCHLORIDE 25 MG: 25 TABLET ORAL at 21:32

## 2025-04-11 RX ADMIN — ENOXAPARIN SODIUM 40 MG: 100 INJECTION SUBCUTANEOUS at 09:09

## 2025-04-11 RX ADMIN — Medication 2 PUFF: at 07:36

## 2025-04-11 RX ADMIN — WATER 40 MG: 1 INJECTION INTRAMUSCULAR; INTRAVENOUS; SUBCUTANEOUS at 18:19

## 2025-04-11 RX ADMIN — WATER 40 MG: 1 INJECTION INTRAMUSCULAR; INTRAVENOUS; SUBCUTANEOUS at 01:23

## 2025-04-11 RX ADMIN — AZITHROMYCIN DIHYDRATE 500 MG: 250 TABLET, FILM COATED ORAL at 09:09

## 2025-04-11 RX ADMIN — IPRATROPIUM BROMIDE AND ALBUTEROL SULFATE 1 DOSE: .5; 3 SOLUTION RESPIRATORY (INHALATION) at 23:53

## 2025-04-11 RX ADMIN — IPRATROPIUM BROMIDE AND ALBUTEROL SULFATE 1 DOSE: .5; 3 SOLUTION RESPIRATORY (INHALATION) at 16:08

## 2025-04-11 RX ADMIN — WATER 40 MG: 1 INJECTION INTRAMUSCULAR; INTRAVENOUS; SUBCUTANEOUS at 06:38

## 2025-04-11 RX ADMIN — WATER 40 MG: 1 INJECTION INTRAMUSCULAR; INTRAVENOUS; SUBCUTANEOUS at 13:05

## 2025-04-11 RX ADMIN — ESCITALOPRAM OXALATE 20 MG: 10 TABLET ORAL at 09:09

## 2025-04-11 RX ADMIN — IPRATROPIUM BROMIDE AND ALBUTEROL SULFATE 1 DOSE: .5; 3 SOLUTION RESPIRATORY (INHALATION) at 04:16

## 2025-04-11 RX ADMIN — TIOTROPIUM BROMIDE INHALATION SPRAY 2 PUFF: 3.12 SPRAY, METERED RESPIRATORY (INHALATION) at 07:36

## 2025-04-11 RX ADMIN — GUAIFENESIN AND DEXTROMETHORPHAN 5 ML: 100; 10 SYRUP ORAL at 21:32

## 2025-04-11 RX ADMIN — BUDESONIDE 500 MCG: 0.5 INHALANT RESPIRATORY (INHALATION) at 20:06

## 2025-04-11 RX ADMIN — Medication 10 ML: at 01:23

## 2025-04-11 RX ADMIN — IPRATROPIUM BROMIDE AND ALBUTEROL SULFATE 1 DOSE: .5; 3 SOLUTION RESPIRATORY (INHALATION) at 20:06

## 2025-04-11 RX ADMIN — IPRATROPIUM BROMIDE AND ALBUTEROL SULFATE 1 DOSE: .5; 3 SOLUTION RESPIRATORY (INHALATION) at 00:02

## 2025-04-11 NOTE — PROGRESS NOTES
Pharmacy Home Medication Reconciliation Note    A medication reconciliation has been completed for Neena Mejia 1960    Pharmacy: [address]   Information provided by: patient    The patient's home medication list is as follows:  No current facility-administered medications on file prior to encounter.     Current Outpatient Medications on File Prior to Encounter   Medication Sig Dispense Refill    TRELEGY ELLIPTA 100-62.5-25 MCG/ACT AEPB inhaler Inhale 1 puff into the lungs daily      azithromycin (ZITHROMAX) 250 MG tablet Take 1 tablet by mouth three times a week (Patient taking differently: Take 1 tablet by mouth three times a week Monday, Wednesday, Friday.) 12 tablet 3    escitalopram (LEXAPRO) 20 MG tablet Take 1 tablet by mouth daily      albuterol sulfate HFA (PROVENTIL;VENTOLIN;PROAIR) 108 (90 Base) MCG/ACT inhaler INHALE 2 PUFFS INTO THE LUNGS EVERY 6 HOURS AS NEEDED FOR WHEEZING 54 g 3    albuterol (PROVENTIL) (2.5 MG/3ML) 0.083% nebulizer solution Take 3 mLs by nebulization every 6 hours as needed for Wheezing Dx: COPD   ICD-10: J44.9 120 each 0    amitriptyline (ELAVIL) 25 MG tablet Take 1 tablet by mouth nightly      fluticasone-salmeterol (ADVAIR HFA) 230-21 MCG/ACT inhaler Inhale 2 puffs into the lungs 2 times daily (Patient not taking: Reported on 4/10/2025) 3 each 3    SPIRIVA RESPIMAT 2.5 MCG/ACT AERS inhaler INHALE 2 PUFFS INTO THE LUNGS DAILY (Patient not taking: Reported on 4/10/2025) 4 g 5       Patient is no longer taking Advair or Spiriva.    Of note, patient is on Zithromax 250 every Mon, Wed, Fri in the AM only. Patient took morning meds prior to ED arrival.    Timing of last doses updated.    Thank you,  Inocencia Fuentes, AARONhT

## 2025-04-11 NOTE — H&P
V2.0  History and Physical      Name:  Neena Mejia /Age/Sex: 1960  (64 y.o. female)   MRN & CSN:  [id number] & [id number] Encounter Date/Time: 4/10/2025 9:00 PM EDT   Location:  ED PCP: Haim Malagon DO       Hospital Day: 1    Assessment and Plan:   Neena Mejia is a 64 y.o. female with a pmh of COPD, chronic respiratory failure on 3 L oxygen, anxiety and depression, former tobacco user quit 3 years ago who presents with COPD exacerbation (HCC)    Hospital Problems           Last Modified POA    * (Principal) COPD exacerbation (HCC) 4/10/2025 Yes       Plan:  #Acute on chronic hypoxic hypercapnic respiratory failure  #Respiratory acidosis with compensated metabolic alkalosis  #COPD exacerbation  Patient on 3 L oxygen at baseline however today she is requiring BiPAP  - VBG pH 7.346, pCO2 78.9, bicarb 43.1  - Normal WBC, Pro-Eugenio negative, COVID and flu negative.  - Chest x-ray no acute cardiopulmonary etiology.  - CT of chest no evidence of PE or acute pulmonary abnormality.  Severe emphysema  - Continue Nebulizer treatments: DuoNebs q 4 hours  - Continue IV Solu-Medrol 40 mg   - Currently patient on BiPAP  - Encourage Incentive Spirometry   - Continue Oxygen Supplementation titrate to maintain oxygen saturation greater than 90%  - Continue Mucinex and Robitussin for secretion clearance and cough    # Cough: -Continue Tessalon Perle    #Anxiety and depression continue medication    DVT Prophylaxis: Lovenox  Diet: Regular diet  Advance care planning:  Advance care planning the patient for total of 16 minutes.  Full code, DNR CC, DNR CCA, power of  and living will were discussed.  Patient elected to be full code    Disposition:   Current Living situation: Home  Expected Disposition: Home  Estimated D/C: 3 days    Diet ADULT DIET; Regular   DVT Prophylaxis [x] Lovenox, []  Heparin, [] SCDs, [] Ambulation,  [] Eliquis, [] Xarelto, [] Coumadin   Code Status Full Code   Surrogate

## 2025-04-11 NOTE — PLAN OF CARE
Problem: Chronic Conditions and Co-morbidities  Goal: Patient's chronic conditions and co-morbidity symptoms are monitored and maintained or improved  Outcome: Progressing  Flowsheets (Taken 4/10/2025 2147)  Care Plan - Patient's Chronic Conditions and Co-Morbidity Symptoms are Monitored and Maintained or Improved:   Monitor and assess patient's chronic conditions and comorbid symptoms for stability, deterioration, or improvement   Collaborate with multidisciplinary team to address chronic and comorbid conditions and prevent exacerbation or deterioration   Update acute care plan with appropriate goals if chronic or comorbid symptoms are exacerbated and prevent overall improvement and discharge     Problem: Discharge Planning  Goal: Discharge to home or other facility with appropriate resources  Outcome: Progressing  Flowsheets (Taken 4/10/2025 2147)  Discharge to home or other facility with appropriate resources:   Identify barriers to discharge with patient and caregiver   Arrange for needed discharge resources and transportation as appropriate   Identify discharge learning needs (meds, wound care, etc)   Refer to discharge planning if patient needs post-hospital services based on physician order or complex needs related to functional status, cognitive ability or social support system     Problem: Safety - Adult  Goal: Free from fall injury  Outcome: Progressing     Problem: Skin/Tissue Integrity  Goal: Skin integrity remains intact  Description: 1.  Monitor for areas of redness and/or skin breakdown  2.  Assess vascular access sites hourly  3.  Every 4-6 hours minimum:  Change oxygen saturation probe site  4.  Every 4-6 hours:  If on nasal continuous positive airway pressure, respiratory therapy assess nares and determine need for appliance change or resting period  Outcome: Progressing

## 2025-04-11 NOTE — PROGRESS NOTES
[facility]    4/11/2025 8:43 AM        Name: Neena Mejia .              Admitted: 4/10/2025  Primary Care Provider: Haim Malagon DO (Tel: [phone])      Subjective:  .    Seen this am while on BIPAP.  Feels anxious and short of breath.  Saturations are good.    No current wheezing.   Xanax given per request  RN at bedside and updated      Reviewed interval ancillary notes    Current Medications  albuterol (PROVENTIL) (2.5 MG/3ML) 0.083% nebulizer solution 2.5 mg, Q6H PRN  amitriptyline (ELAVIL) tablet 25 mg, Nightly  escitalopram (LEXAPRO) tablet 20 mg, Daily  budesonide-formoterol (SYMBICORT) 160-4.5 MCG/ACT inhaler 2 puff, BID RT  tiotropium (SPIRIVA RESPIMAT) 2.5 MCG/ACT inhaler 2 puff, Daily  tiotropium (SPIRIVA RESPIMAT) 2.5 MCG/ACT inhaler 2 puff, Daily RT  sodium chloride flush 0.9 % injection 5-40 mL, 2 times per day  sodium chloride flush 0.9 % injection 5-40 mL, PRN  0.9 % sodium chloride infusion, PRN  ondansetron (ZOFRAN-ODT) disintegrating tablet 4 mg, Q8H PRN   Or  ondansetron (ZOFRAN) injection 4 mg, Q6H PRN  polyethylene glycol (GLYCOLAX) packet 17 g, Daily PRN  enoxaparin (LOVENOX) injection 40 mg, Daily  acetaminophen (TYLENOL) tablet 650 mg, Q6H PRN   Or  acetaminophen (TYLENOL) suppository 650 mg, Q6H PRN  azithromycin (ZITHROMAX) tablet 500 mg, Daily  guaiFENesin-dextromethorphan (ROBITUSSIN DM) 100-10 MG/5ML syrup 5 mL, Q4H PRN  benzonatate (TESSALON) capsule 100 mg, TID PRN  methylPREDNISolone sodium succ (SOLU-MEDROL) 40 mg in sterile water 1 mL injection, Q6H   Followed by  [START ON 4/13/2025] predniSONE (DELTASONE) tablet 40 mg, Daily  ipratropium 0.5 mg-albuterol 2.5 mg (DUONEB) nebulizer solution 1 Dose, Q4H RT        Objective:  /73   Pulse 74   Temp 98 °F (36.7 °C) (Axillary)   Resp 20   Ht 1.6 m (5' 3\")   Wt 59.6 kg (131 lb 6.4 oz)   SpO2 99%   BMI 23.28 kg/m²     Intake/Output Summary (Last 24 hours) at

## 2025-04-11 NOTE — ED NOTES
Patient Name: Neena Mejia  : 1960 64 y.o.  MRN: [id number]  ED Room #: -002/     Chief complaint:   Chief Complaint   Patient presents with    Shortness of Breath     Ems report: pt. On CPAP, on arrival pt. On chronic 3L NC at 80%, improved with CPAP, history of COPD, pt. Reports SOB since Monday.      Hospital Problem/Diagnosis:   Hospital Problems           Last Modified POA    * (Principal) COPD exacerbation (HCC) 4/10/2025 Yes         O2 Flow Rate:O2 Device: PAP (positive airway pressure)   (if applicable)  Cardiac Rhythm:   (if applicable)  Active LDA's:   Peripheral IV 04/10/25 Right Antecubital (Active)            How does patient ambulate? Unknown, did not assess in the Emergency Department    2. How does patient take pills? Unknown, no oral medications were given in the Emergency Department    3. Is patient alert? Alert    4. Is patient oriented? To Person, To Place, To Time, and To Situation    5.   Patient arrived from:  home  Facility Name: ___________________________________________    6. If patient is disoriented or from a Skill Nursing Facility has family been notified of admission? Yes    7. Patient belongings? Belongings: Cell Phone and Clothing, necklace, glasses    Disposition of belongings? Kept with Patient     8. Any specific patient or family belongings/needs/dynamics?   a. N/a    9. Miscellaneous comments/pending orders?  a. Inpatient orders      If there are any additional questions please reach out to the Emergency Department.

## 2025-04-11 NOTE — PROGRESS NOTES
Patient requesting bipap mask off at this time. Placed on nasal cannula 4L o2. Continuous pulse ox in place.

## 2025-04-11 NOTE — PROGRESS NOTES
Pt having SOB and difficulty catching her breathe.  She requested the bipap be put back on.  Will continue to monitor

## 2025-04-11 NOTE — CONSULTS
Pulmonary and Critical Care Medicine    CC: SOB   Date: 2025  Admit Date:  4/10/2025  Reason for Consultation: COPD  Consult Requesting Physician: Charlene Coffey MD     HPI     This is a 65 y/o F w/ a hx of COPD, Tobacco use, on home oxygen therapy 3L, who presented to [facility] with SOB. Patient was found to be hypoxic by EMS on 3L she was placed on CPAP and brought to [facility]. ER work up was negative for COVID-19, Influenza, low procal, nl WBC, VBG 7.34/78/246. CT-PE negative for PE. She was admitted to the medical floor with AE-COPD and acute on chronic hypoxic / hypercapnic respiratory failure, pulmonary medicine was consulted to help with the management on my exam patient was on bipap 14/ fi02 40% feeling better since admission.     ROS: unable to get complete ROS given patient is on bipap     PMH:  has a past medical history of COPD (chronic obstructive pulmonary disease) (HCC).   PSH:  has a past surgical history that includes Tubal ligation;  section; Bronchoscopy (3/5/2025); bronchoscopy (N/A, 3/5/2025); and bronchoscopy (N/A, 3/5/2025).    SH:  reports that she quit smoking about 2 years ago. Her smoking use included cigarettes. She started smoking about 45 years ago. She has a 32.3 pack-year smoking history. She has never used smokeless tobacco. She reports that she does not drink alcohol and does not use drugs.   FH: family history includes Cancer in her father; Heart Disease in her brother and sister; Stroke in her mother.    Allergies: Patient has no known allergies.     OBJECTIVE DATA       PHYSICAL EXAM:   Temp  Av.8 °F (36.6 °C)  Min: 96.8 °F (36 °C)  Max: 98.4 °F (36.9 °C)  Pulse  Av.9  Min: 74  Max: 119  BP  Min: 106/73  Max: 152/99  SpO2  Av.5 %  Min: 92 %  Max: 100 %  FiO2   Av.5 %  Min: 30 %  Max: 40 %    General: NAD  Neuro: Awake and alert  Lung: bilateral wheezing equal non labored  Heart: regular rate  LE: warm perfused    MEDICATIONS: Reviewed  Scheduled  will need to be put back on bipap at night  - continue solu-medrol as ordered  - change Symbicort to brovana/pulmicort BID  - duoneb q 4 hrs  - continue azithromycin 500 daily x 5 days    Boyfriend was at the bedside     High risk     Ed Flores MD  Pulmonary and Critical Care Medicine   [facility]

## 2025-04-12 PROBLEM — R00.0 WIDE-COMPLEX TACHYCARDIA: Status: ACTIVE | Noted: 2025-04-12

## 2025-04-12 PROBLEM — B34.8 RHINOVIRUS INFECTION: Status: ACTIVE | Noted: 2025-04-12

## 2025-04-12 PROBLEM — I50.22 SYSTOLIC CHF, CHRONIC (HCC): Status: ACTIVE | Noted: 2025-04-12

## 2025-04-12 LAB
ALBUMIN SERPL-MCNC: 4.5 G/DL (ref 3.4–5)
ANION GAP SERPL CALCULATED.3IONS-SCNC: 10 MMOL/L (ref 3–16)
BASOPHILS # BLD: 0 K/UL (ref 0–0.2)
BASOPHILS NFR BLD: 0.2 %
BUN SERPL-MCNC: 16 MG/DL (ref 7–20)
CALCIUM SERPL-MCNC: 10 MG/DL (ref 8.3–10.6)
CHLORIDE SERPL-SCNC: 93 MMOL/L (ref 99–110)
CO2 SERPL-SCNC: 36 MMOL/L (ref 21–32)
CREAT SERPL-MCNC: 0.4 MG/DL (ref 0.6–1.2)
DEPRECATED RDW RBC AUTO: 13.4 % (ref 12.4–15.4)
EOSINOPHIL # BLD: 0 K/UL (ref 0–0.6)
EOSINOPHIL NFR BLD: 0 %
GFR SERPLBLD CREATININE-BSD FMLA CKD-EPI: >90 ML/MIN/{1.73_M2}
GLUCOSE SERPL-MCNC: 127 MG/DL (ref 70–99)
HCT VFR BLD AUTO: 43.2 % (ref 36–48)
HGB BLD-MCNC: 14.5 G/DL (ref 12–16)
LYMPHOCYTES # BLD: 1.1 K/UL (ref 1–5.1)
LYMPHOCYTES NFR BLD: 11.4 %
MAGNESIUM SERPL-MCNC: 2.14 MG/DL (ref 1.8–2.4)
MCH RBC QN AUTO: 29.3 PG (ref 26–34)
MCHC RBC AUTO-ENTMCNC: 33.5 G/DL (ref 31–36)
MCV RBC AUTO: 87.5 FL (ref 80–100)
MONOCYTES # BLD: 0.6 K/UL (ref 0–1.3)
MONOCYTES NFR BLD: 5.9 %
NEUTROPHILS # BLD: 7.7 K/UL (ref 1.7–7.7)
NEUTROPHILS NFR BLD: 82.5 %
PHOSPHATE SERPL-MCNC: 3.8 MG/DL (ref 2.5–4.9)
PLATELET # BLD AUTO: 313 K/UL (ref 135–450)
PMV BLD AUTO: 7.5 FL (ref 5–10.5)
POTASSIUM SERPL-SCNC: 4.8 MMOL/L (ref 3.5–5.1)
RBC # BLD AUTO: 4.94 M/UL (ref 4–5.2)
SODIUM SERPL-SCNC: 139 MMOL/L (ref 136–145)
WBC # BLD AUTO: 9.4 K/UL (ref 4–11)

## 2025-04-12 PROCEDURE — 85025 COMPLETE CBC W/AUTO DIFF WBC: CPT

## 2025-04-12 PROCEDURE — 6360000002 HC RX W HCPCS

## 2025-04-12 PROCEDURE — 80069 RENAL FUNCTION PANEL: CPT

## 2025-04-12 PROCEDURE — 6360000002 HC RX W HCPCS: Performed by: STUDENT IN AN ORGANIZED HEALTH CARE EDUCATION/TRAINING PROGRAM

## 2025-04-12 PROCEDURE — 99232 SBSQ HOSP IP/OBS MODERATE 35: CPT | Performed by: INTERNAL MEDICINE

## 2025-04-12 PROCEDURE — 2060000000 HC ICU INTERMEDIATE R&B

## 2025-04-12 PROCEDURE — 6370000000 HC RX 637 (ALT 250 FOR IP)

## 2025-04-12 PROCEDURE — 2500000003 HC RX 250 WO HCPCS

## 2025-04-12 PROCEDURE — 83735 ASSAY OF MAGNESIUM: CPT

## 2025-04-12 PROCEDURE — 94150 VITAL CAPACITY TEST: CPT

## 2025-04-12 PROCEDURE — 6370000000 HC RX 637 (ALT 250 FOR IP): Performed by: NURSE PRACTITIONER

## 2025-04-12 PROCEDURE — 6370000000 HC RX 637 (ALT 250 FOR IP): Performed by: INTERNAL MEDICINE

## 2025-04-12 PROCEDURE — 99223 1ST HOSP IP/OBS HIGH 75: CPT | Performed by: INTERNAL MEDICINE

## 2025-04-12 PROCEDURE — 36415 COLL VENOUS BLD VENIPUNCTURE: CPT

## 2025-04-12 PROCEDURE — 94669 MECHANICAL CHEST WALL OSCILL: CPT

## 2025-04-12 PROCEDURE — 2700000000 HC OXYGEN THERAPY PER DAY

## 2025-04-12 PROCEDURE — 94640 AIRWAY INHALATION TREATMENT: CPT

## 2025-04-12 PROCEDURE — 94660 CPAP INITIATION&MGMT: CPT

## 2025-04-12 PROCEDURE — 94761 N-INVAS EAR/PLS OXIMETRY MLT: CPT

## 2025-04-12 PROCEDURE — 6360000002 HC RX W HCPCS: Performed by: INTERNAL MEDICINE

## 2025-04-12 PROCEDURE — 6370000000 HC RX 637 (ALT 250 FOR IP): Performed by: STUDENT IN AN ORGANIZED HEALTH CARE EDUCATION/TRAINING PROGRAM

## 2025-04-12 RX ORDER — LEVALBUTEROL INHALATION SOLUTION 1.25 MG/3ML
1.25 SOLUTION RESPIRATORY (INHALATION)
Status: DISCONTINUED | OUTPATIENT
Start: 2025-04-12 | End: 2025-04-18 | Stop reason: HOSPADM

## 2025-04-12 RX ORDER — ALPRAZOLAM 0.5 MG
0.5 TABLET ORAL EVERY 6 HOURS PRN
Status: DISCONTINUED | OUTPATIENT
Start: 2025-04-12 | End: 2025-04-18 | Stop reason: HOSPADM

## 2025-04-12 RX ORDER — GUAIFENESIN 600 MG/1
600 TABLET, EXTENDED RELEASE ORAL 2 TIMES DAILY
Status: DISCONTINUED | OUTPATIENT
Start: 2025-04-12 | End: 2025-04-18 | Stop reason: HOSPADM

## 2025-04-12 RX ORDER — PREDNISONE 20 MG/1
40 TABLET ORAL DAILY
Status: COMPLETED | OUTPATIENT
Start: 2025-04-13 | End: 2025-04-15

## 2025-04-12 RX ORDER — LISINOPRIL 5 MG/1
2.5 TABLET ORAL DAILY
Status: DISCONTINUED | OUTPATIENT
Start: 2025-04-12 | End: 2025-04-18 | Stop reason: HOSPADM

## 2025-04-12 RX ADMIN — IPRATROPIUM BROMIDE AND ALBUTEROL SULFATE 1 DOSE: .5; 3 SOLUTION RESPIRATORY (INHALATION) at 07:38

## 2025-04-12 RX ADMIN — LEVALBUTEROL HYDROCHLORIDE 1.25 MG: 1.25 SOLUTION RESPIRATORY (INHALATION) at 15:16

## 2025-04-12 RX ADMIN — BENZONATATE 100 MG: 100 CAPSULE ORAL at 14:19

## 2025-04-12 RX ADMIN — GUAIFENESIN 600 MG: 600 TABLET, EXTENDED RELEASE ORAL at 10:14

## 2025-04-12 RX ADMIN — GUAIFENESIN AND DEXTROMETHORPHAN 5 ML: 100; 10 SYRUP ORAL at 21:18

## 2025-04-12 RX ADMIN — METHYLPREDNISOLONE SODIUM SUCCINATE 40 MG: 40 INJECTION, POWDER, FOR SOLUTION INTRAMUSCULAR; INTRAVENOUS at 08:42

## 2025-04-12 RX ADMIN — LISINOPRIL 2.5 MG: 5 TABLET ORAL at 17:31

## 2025-04-12 RX ADMIN — BUDESONIDE 500 MCG: 0.5 INHALANT RESPIRATORY (INHALATION) at 07:38

## 2025-04-12 RX ADMIN — ALPRAZOLAM 0.5 MG: 0.5 TABLET ORAL at 21:15

## 2025-04-12 RX ADMIN — GUAIFENESIN 600 MG: 600 TABLET, EXTENDED RELEASE ORAL at 21:13

## 2025-04-12 RX ADMIN — METHYLPREDNISOLONE SODIUM SUCCINATE 40 MG: 40 INJECTION, POWDER, FOR SOLUTION INTRAMUSCULAR; INTRAVENOUS at 00:42

## 2025-04-12 RX ADMIN — ARFORMOTEROL TARTRATE 15 MCG: 15 SOLUTION RESPIRATORY (INHALATION) at 19:31

## 2025-04-12 RX ADMIN — Medication 10 ML: at 08:43

## 2025-04-12 RX ADMIN — GUAIFENESIN AND DEXTROMETHORPHAN 5 ML: 100; 10 SYRUP ORAL at 04:00

## 2025-04-12 RX ADMIN — BUDESONIDE 500 MCG: 0.5 INHALANT RESPIRATORY (INHALATION) at 19:31

## 2025-04-12 RX ADMIN — LEVALBUTEROL HYDROCHLORIDE 1.25 MG: 1.25 SOLUTION RESPIRATORY (INHALATION) at 11:13

## 2025-04-12 RX ADMIN — ALPRAZOLAM 0.5 MG: 0.5 TABLET ORAL at 09:49

## 2025-04-12 RX ADMIN — Medication 10 ML: at 21:26

## 2025-04-12 RX ADMIN — ESCITALOPRAM OXALATE 20 MG: 10 TABLET ORAL at 08:42

## 2025-04-12 RX ADMIN — IPRATROPIUM BROMIDE AND ALBUTEROL SULFATE 1 DOSE: .5; 3 SOLUTION RESPIRATORY (INHALATION) at 04:15

## 2025-04-12 RX ADMIN — ARFORMOTEROL TARTRATE 15 MCG: 15 SOLUTION RESPIRATORY (INHALATION) at 07:38

## 2025-04-12 RX ADMIN — METHYLPREDNISOLONE SODIUM SUCCINATE 40 MG: 40 INJECTION, POWDER, FOR SOLUTION INTRAMUSCULAR; INTRAVENOUS at 11:59

## 2025-04-12 RX ADMIN — BENZONATATE 100 MG: 100 CAPSULE ORAL at 08:53

## 2025-04-12 RX ADMIN — AZITHROMYCIN DIHYDRATE 500 MG: 250 TABLET, FILM COATED ORAL at 08:42

## 2025-04-12 RX ADMIN — ALPRAZOLAM 0.5 MG: 0.5 TABLET ORAL at 04:09

## 2025-04-12 RX ADMIN — ENOXAPARIN SODIUM 40 MG: 100 INJECTION SUBCUTANEOUS at 08:41

## 2025-04-12 RX ADMIN — METHYLPREDNISOLONE SODIUM SUCCINATE 40 MG: 40 INJECTION, POWDER, FOR SOLUTION INTRAMUSCULAR; INTRAVENOUS at 18:30

## 2025-04-12 RX ADMIN — LEVALBUTEROL HYDROCHLORIDE 1.25 MG: 1.25 SOLUTION RESPIRATORY (INHALATION) at 19:31

## 2025-04-12 RX ADMIN — AMITRIPTYLINE HYDROCHLORIDE 25 MG: 25 TABLET ORAL at 21:13

## 2025-04-12 NOTE — PROGRESS NOTES
[facility] Pulmonary/CCM Progress note      Admit Date: 4/10/2025    Chief Complaint: Shortness of breath    Subjective:     Interval History: Patient still has a congested cough, unable to bring up phlegm.  Requiring 3 to 4 L O2.  No chest pain.    Scheduled Meds:   methylPREDNISolone  40 mg IntraVENous Q6H    Followed by    [START ON 4/13/2025] predniSONE  40 mg Oral Daily    levalbuterol  1.25 mg Nebulization 4x Daily RT    guaiFENesin  600 mg Oral BID    lisinopril  2.5 mg Oral Daily    arformoterol tartrate  15 mcg Nebulization BID RT    budesonide  0.5 mg Nebulization BID RT    azithromycin  500 mg Oral Daily    amitriptyline  25 mg Oral Nightly    escitalopram  20 mg Oral Daily    sodium chloride flush  5-40 mL IntraVENous 2 times per day    enoxaparin  40 mg SubCUTAneous Daily     Continuous Infusions:   sodium chloride       PRN Meds:ALPRAZolam, albuterol, sodium chloride flush, sodium chloride, ondansetron **OR** ondansetron, polyethylene glycol, acetaminophen **OR** acetaminophen, guaiFENesin-dextromethorphan, benzonatate    Review of Systems  Constitutional: Fatigue and malaise  Ears, nose, mouth, throat: negative for ear drainage, epistaxis, hoarseness, nasal congestion, sore throat and voice change  Respiratory: negative except for cough, shortness of breath, and wheezing  Cardiovascular: negative for chest pain, chest pressure/discomfort, irregular heart beat, lower extremity edema and palpitations  Gastrointestinal: negative for abdominal pain, constipation, diarrhea, jaundice, melena, odynophagia, reflux symptoms and vomiting  Hematologic/lymphatic: negative for bleeding, easy bruising, lymphadenopathy and petechiae  Musculoskeletal:negative for arthralgias, bone pain, muscle weakness, neck pain and stiff joints  Neurological: negative for dizziness, gait problems, headaches, seizures, speech problems, tremors and weakness  Behavioral/Psych: negative for anxiety, behavior problems, depression, fatigue and  pulmonary embolism or acute pulmonary abnormality.     Severe pulmonary emphysematous changes are seen.    Problem List:   Acute on chronic hypoxic/hypercapnic respiratory failure  Acute exacerbation of COPD  Mucous plugging of airway  Human rhinovirus+  Assessment/Plan:     Acute on chronic hypoxic/hypercapnic respiratory failure, requiring intermittent BiPAP.  On 3 to 4 L O2.  CTA chest reveals no acute infiltrates or nodules.  Severe emphysema noted.  No clear evidence of mucous plugging, though patient actually sounds very congested.  Continue bronchodilators, add Acapella valve and chest vest therapy.  Switch DuoNeb to Xopenex breathing treatments-patient states that she has significant tremors.  On Solu-Medrol 40 mg Q6 hourly.  Molecular panel-human rhinovirus+    Recent hospitalization between 3/5 and 3/8 for COPD exacerbation and mucous plugging requiring bronchoscopy on 3/5.  Hopefully we can avoid another bronchoscopy.  No significant mucous plugging noted on current CT.    Pulmonary will follow    Fredi Platt MD

## 2025-04-12 NOTE — PROGRESS NOTES
[facility]    4/12/2025 7:21 AM        Name: Neena Mejia .              Admitted: 4/10/2025  Primary Care Provider: Haim Malagon DO (Tel: [phone])      Subjective:  .    Seen this am while on BIPAP.  Had a run of wide complex tachycardia this am after a tx.  ( Mag 2.1  K 4.8 )  Pt was asymptomatic   She is tolerating the BIPAP with oral xanax   No current reports of chest pain   We reviewed labs     Human Rhinovirus/Enterovirus by PCR Abnormal           Reviewed interval ancillary notes    Current Medications  methylPREDNISolone sodium (PF) (SOLU-MEDROL PF) injection 40 mg, Q6H   Followed by  [START ON 4/13/2025] predniSONE (DELTASONE) tablet 40 mg, Daily  ALPRAZolam (XANAX) tablet 0.5 mg, TID PRN  arformoterol tartrate (BROVANA) nebulizer solution 15 mcg, BID RT  budesonide (PULMICORT) nebulizer suspension 500 mcg, BID RT  azithromycin (ZITHROMAX) tablet 500 mg, Daily  albuterol (PROVENTIL) (2.5 MG/3ML) 0.083% nebulizer solution 2.5 mg, Q6H PRN  amitriptyline (ELAVIL) tablet 25 mg, Nightly  escitalopram (LEXAPRO) tablet 20 mg, Daily  sodium chloride flush 0.9 % injection 5-40 mL, 2 times per day  sodium chloride flush 0.9 % injection 5-40 mL, PRN  0.9 % sodium chloride infusion, PRN  ondansetron (ZOFRAN-ODT) disintegrating tablet 4 mg, Q8H PRN   Or  ondansetron (ZOFRAN) injection 4 mg, Q6H PRN  polyethylene glycol (GLYCOLAX) packet 17 g, Daily PRN  enoxaparin (LOVENOX) injection 40 mg, Daily  acetaminophen (TYLENOL) tablet 650 mg, Q6H PRN   Or  acetaminophen (TYLENOL) suppository 650 mg, Q6H PRN  guaiFENesin-dextromethorphan (ROBITUSSIN DM) 100-10 MG/5ML syrup 5 mL, Q4H PRN  benzonatate (TESSALON) capsule 100 mg, TID PRN  ipratropium 0.5 mg-albuterol 2.5 mg (DUONEB) nebulizer solution 1 Dose, Q4H RT        Objective:  BP 90/62   Pulse (!) 103   Temp 97 °F (36.1 °C) (Temporal)   Resp 24   Ht 1.6 m (5' 3\")   Wt 49.8 kg (109 lb 11.2 oz)   SpO2 94%

## 2025-04-12 NOTE — DISCHARGE INSTR - COC
Continuity of Care Form    Patient Name: Neena Mejia   :  1960  MRN:  [id number]    Admit date:  4/10/2025  Discharge date:  25    Code Status Order: Full Code   Advance Directives:     Admitting Physician:  Marj Tinoco MD  PCP: Haim Malagon DO    Discharging Nurse: [facility] 3TN-3364/3364-01  Discharging Unit Phone Number: [phone]    Emergency Contact:   Extended Emergency Contact Information  Primary Emergency Contact: PEDRO MIXON  Mobile Phone: [phone]  Relation: Other    Past Surgical History:  Past Surgical History:   Procedure Laterality Date    BRONCHOSCOPY  3/5/2025    BRONCHOSCOPY N/A 3/5/2025    BRONCHOSCOPY THERAPEUTIC ASPIRATION INITIAL performed by Chaz Ochoa MD at [facility] ENDOSCOPY    BRONCHOSCOPY N/A 3/5/2025    BRONCHOSCOPY ALVEOLAR LAVAGE performed by Chaz Ochoa MD at [facility] ENDOSCOPY     SECTION      TUBAL LIGATION         Immunization History:     There is no immunization history on file for this patient.    Active Problems:  Patient Active Problem List   Diagnosis Code    SOB (shortness of breath) R06.02    COPD, severe ([facility]) J44.9    Centrilobular emphysema ([facility]) J43.2    Pulmonary nodule R91.1    Chronic hypoxemic respiratory failure J96.11    Former smoker Z87.891    Acute respiratory failure with hypoxia and hypercapnia J96.01, J96.02    COPD with acute exacerbation (HCC) J44.1    Pneumonia involving left lung J18.9    HTN (hypertension) I10    Controlled type 2 diabetes mellitus, with long-term current use of insulin (HCC) E11.9, Z79.4    Atrial tachycardia I47.19    Severe malnutrition E43    Acute on chronic respiratory failure with hypoxia and hypercapnia J96.21, J96.22    Personal history of tobacco use Z87.891    Wide-complex tachycardia R00.0    Systolic CHF, chronic (HCC) I50.22    Rhinovirus infection B34.8       Isolation/Infection:   Isolation            Droplet          Patient

## 2025-04-12 NOTE — FLOWSHEET NOTE
04/11/25 2336   Treatment Team Notification   Reason for Communication Critical results   Type of Critical Result Laboratory   Critical Lab Information PCR + Human Rhinovirus   Person Result Received From N/A   Critical Lab Result Type Other (comment)  (Resp panel)   Name of Team Member Notified Deborah Underwood   Treatment Team Role Physician Assistant   Method of Communication Secure Message   Response Waiting for response;See orders   Notification Time 1414

## 2025-04-13 LAB — MAGNESIUM SERPL-MCNC: 2.09 MG/DL (ref 1.8–2.4)

## 2025-04-13 PROCEDURE — 2700000000 HC OXYGEN THERAPY PER DAY

## 2025-04-13 PROCEDURE — 99233 SBSQ HOSP IP/OBS HIGH 50: CPT | Performed by: NURSE PRACTITIONER

## 2025-04-13 PROCEDURE — 99232 SBSQ HOSP IP/OBS MODERATE 35: CPT | Performed by: INTERNAL MEDICINE

## 2025-04-13 PROCEDURE — 6370000000 HC RX 637 (ALT 250 FOR IP): Performed by: NURSE PRACTITIONER

## 2025-04-13 PROCEDURE — 6370000000 HC RX 637 (ALT 250 FOR IP)

## 2025-04-13 PROCEDURE — 6370000000 HC RX 637 (ALT 250 FOR IP): Performed by: STUDENT IN AN ORGANIZED HEALTH CARE EDUCATION/TRAINING PROGRAM

## 2025-04-13 PROCEDURE — 94640 AIRWAY INHALATION TREATMENT: CPT

## 2025-04-13 PROCEDURE — 6370000000 HC RX 637 (ALT 250 FOR IP): Performed by: INTERNAL MEDICINE

## 2025-04-13 PROCEDURE — 2500000003 HC RX 250 WO HCPCS

## 2025-04-13 PROCEDURE — 2060000000 HC ICU INTERMEDIATE R&B

## 2025-04-13 PROCEDURE — 83735 ASSAY OF MAGNESIUM: CPT

## 2025-04-13 PROCEDURE — 36415 COLL VENOUS BLD VENIPUNCTURE: CPT

## 2025-04-13 PROCEDURE — 94761 N-INVAS EAR/PLS OXIMETRY MLT: CPT

## 2025-04-13 PROCEDURE — 6360000002 HC RX W HCPCS: Performed by: STUDENT IN AN ORGANIZED HEALTH CARE EDUCATION/TRAINING PROGRAM

## 2025-04-13 PROCEDURE — 94669 MECHANICAL CHEST WALL OSCILL: CPT

## 2025-04-13 PROCEDURE — 6360000002 HC RX W HCPCS: Performed by: INTERNAL MEDICINE

## 2025-04-13 PROCEDURE — 6360000002 HC RX W HCPCS

## 2025-04-13 RX ADMIN — GUAIFENESIN 600 MG: 600 TABLET, EXTENDED RELEASE ORAL at 08:08

## 2025-04-13 RX ADMIN — GUAIFENESIN 600 MG: 600 TABLET, EXTENDED RELEASE ORAL at 21:20

## 2025-04-13 RX ADMIN — AZITHROMYCIN DIHYDRATE 500 MG: 250 TABLET, FILM COATED ORAL at 08:07

## 2025-04-13 RX ADMIN — BUDESONIDE 500 MCG: 0.5 INHALANT RESPIRATORY (INHALATION) at 09:05

## 2025-04-13 RX ADMIN — PREDNISONE 40 MG: 20 TABLET ORAL at 08:07

## 2025-04-13 RX ADMIN — LEVALBUTEROL HYDROCHLORIDE 1.25 MG: 1.25 SOLUTION RESPIRATORY (INHALATION) at 19:20

## 2025-04-13 RX ADMIN — ENOXAPARIN SODIUM 40 MG: 100 INJECTION SUBCUTANEOUS at 08:08

## 2025-04-13 RX ADMIN — ALPRAZOLAM 0.5 MG: 0.5 TABLET ORAL at 21:20

## 2025-04-13 RX ADMIN — BUDESONIDE 500 MCG: 0.5 INHALANT RESPIRATORY (INHALATION) at 19:21

## 2025-04-13 RX ADMIN — LEVALBUTEROL HYDROCHLORIDE 1.25 MG: 1.25 SOLUTION RESPIRATORY (INHALATION) at 15:15

## 2025-04-13 RX ADMIN — LEVALBUTEROL HYDROCHLORIDE 1.25 MG: 1.25 SOLUTION RESPIRATORY (INHALATION) at 12:32

## 2025-04-13 RX ADMIN — Medication 10 ML: at 08:12

## 2025-04-13 RX ADMIN — GUAIFENESIN AND DEXTROMETHORPHAN 5 ML: 100; 10 SYRUP ORAL at 21:20

## 2025-04-13 RX ADMIN — ALPRAZOLAM 0.5 MG: 0.5 TABLET ORAL at 14:16

## 2025-04-13 RX ADMIN — ARFORMOTEROL TARTRATE 15 MCG: 15 SOLUTION RESPIRATORY (INHALATION) at 09:05

## 2025-04-13 RX ADMIN — ALPRAZOLAM 0.5 MG: 0.5 TABLET ORAL at 08:07

## 2025-04-13 RX ADMIN — AMITRIPTYLINE HYDROCHLORIDE 25 MG: 25 TABLET ORAL at 21:20

## 2025-04-13 RX ADMIN — GUAIFENESIN AND DEXTROMETHORPHAN 5 ML: 100; 10 SYRUP ORAL at 09:00

## 2025-04-13 RX ADMIN — ARFORMOTEROL TARTRATE 15 MCG: 15 SOLUTION RESPIRATORY (INHALATION) at 19:21

## 2025-04-13 RX ADMIN — ESCITALOPRAM OXALATE 20 MG: 10 TABLET ORAL at 08:07

## 2025-04-13 RX ADMIN — LISINOPRIL 2.5 MG: 5 TABLET ORAL at 08:06

## 2025-04-13 RX ADMIN — LEVALBUTEROL HYDROCHLORIDE 1.25 MG: 1.25 SOLUTION RESPIRATORY (INHALATION) at 09:05

## 2025-04-13 ASSESSMENT — PAIN SCALES - GENERAL: PAINLEVEL_OUTOF10: 0

## 2025-04-13 NOTE — PROGRESS NOTES
[facility] Pulmonary/CCM Progress note      Admit Date: 4/10/2025    Chief Complaint: Shortness of breath    Subjective:     Interval History: Some improvement in cough and chest congestion.  States that she feels better.  Still has some wheezing.  On 4 L O2.    Scheduled Meds:   predniSONE  40 mg Oral Daily    levalbuterol  1.25 mg Nebulization 4x Daily RT    guaiFENesin  600 mg Oral BID    lisinopril  2.5 mg Oral Daily    arformoterol tartrate  15 mcg Nebulization BID RT    budesonide  0.5 mg Nebulization BID RT    amitriptyline  25 mg Oral Nightly    escitalopram  20 mg Oral Daily    sodium chloride flush  5-40 mL IntraVENous 2 times per day    enoxaparin  40 mg SubCUTAneous Daily     Continuous Infusions:   sodium chloride       PRN Meds:ALPRAZolam, albuterol, sodium chloride flush, sodium chloride, ondansetron **OR** ondansetron, polyethylene glycol, acetaminophen **OR** acetaminophen, guaiFENesin-dextromethorphan, benzonatate    Review of Systems  Constitutional: Fatigue and malaise  Ears, nose, mouth, throat: negative for ear drainage, epistaxis, hoarseness, nasal congestion, sore throat and voice change  Respiratory: negative except for cough, shortness of breath, and wheezing  Cardiovascular: negative for chest pain, chest pressure/discomfort, irregular heart beat, lower extremity edema and palpitations  Gastrointestinal: negative for abdominal pain, constipation, diarrhea, jaundice, melena, odynophagia, reflux symptoms and vomiting  Hematologic/lymphatic: negative for bleeding, easy bruising, lymphadenopathy and petechiae  Musculoskeletal:negative for arthralgias, bone pain, muscle weakness, neck pain and stiff joints  Neurological: negative for dizziness, gait problems, headaches, seizures, speech problems, tremors and weakness  Behavioral/Psych: negative for anxiety, behavior problems, depression, fatigue and sleep disturbance  Endocrine: negative for diabetic symptoms including none, neuropathy, polyphagia,     BEART 12.7 04/11/2025 04:26 PM    T6SGXQNS 98.8 04/11/2025 04:26 PM    PHART 7.409 04/11/2025 04:26 PM    GLV7DGE 63.7 04/11/2025 04:26 PM    PO2ART 99.5 04/11/2025 04:26 PM    BMM5FPK 94.5 04/11/2025 04:26 PM       Radiology: All pertinent images / reports were reviewed as a part of this visit.    EXAMINATION:  CTA OF THE CHEST 4/10/2025 5:57 pm     TECHNIQUE:  CTA of the chest was performed after the administration of intravenous  contrast.  Multiplanar reformatted images are provided for review.  MIP  images are provided for review. Automated exposure control, iterative  reconstruction, and/or weight based adjustment of the mA/kV was utilized to  reduce the radiation dose to as low as reasonably achievable.     COMPARISON:  March 5, 2025.     HISTORY:  ORDERING SYSTEM PROVIDED HISTORY: hypoxic, chest x-ray clear, R/O PE  TECHNOLOGIST PROVIDED HISTORY:  Reason for exam:->hypoxic, chest x-ray clear, R/O PE  Additional Contrast?->1  Reason for Exam: hypoxic, chest x-ray clear, R/O PE     FINDINGS:  Pulmonary Arteries: Pulmonary arteries are adequately opacified for  evaluation.  No evidence of intraluminal filling defect to suggest pulmonary  embolism.  Main pulmonary artery is mildly dilated measuring 3.5 cm in  diameter.     Mediastinum: No evidence of mediastinal lymphadenopathy.  The heart and  pericardium demonstrate no acute abnormality.  There is no acute abnormality  of the thoracic aorta.     Lungs/pleura: The lungs are without acute process.  Severe pulmonary  emphysematous changes are seen.  No focal consolidation or pulmonary edema.  No evidence of pleural effusion or pneumothorax.     Upper Abdomen: Limited images of the upper abdomen are unremarkable.     Soft Tissues/Bones: No acute bone or soft tissue abnormality.     IMPRESSION:  No evidence of pulmonary embolism or acute pulmonary abnormality.     Severe pulmonary emphysematous changes are seen.    Problem List:   Acute on chronic

## 2025-04-13 NOTE — PROGRESS NOTES
[facility]   Cardiology Progress Note     Date: 4/13/2025  Admit Date: 4/10/2025     Reason for consultation:     Chief Complaint   Patient presents with    Shortness of Breath     Ems report: pt. On CPAP, on arrival pt. On chronic 3L NC at 80%, improved with CPAP, history of COPD, pt. Reports SOB since Monday.        History of Present Illness: History obtained from patient and medical record.     Neena Mejia is a 64 y.o. female who presented to the ED by EMS for shortness of breath on 4/10/25.  She has COPD  and is on 3 liters oxygen at home.  She was placed on CPAP by EMS.  She was 80% on her pulse ox.  Denies fever, chills.  Denies cough.  Denies nausea and vomiting.  Since admission, she has been diagnosed with rhinovirus infection.  She has been getting nebulizer treatments for her shortness of breath and is also on BIPAP.  Today after a nebulizer treatment, she had a run of VT, wide complex of around 20 beats.  The patient admitted to sensing palpitations about the same time.  She has had similar sensation after nebulizer treatments while in the hospital.  She has had multiple admissions this year for COPD exacerbations.     She has no previous cardiac history.  Denies chest pains   we are consulted for wide complex tachycardia.  Her labs show a potassium of 4.8 today.  Magnesium is 2.14.  no anemia. (per consult note)    Interval Hx: Today, she is feeling better today. Just had breathing treatment. Coughing a lot from it. Tele stable. Denies chest pain,     Patient seen and examined. Clinical notes reviewed. Telemetry reviewed.  No new complaints today. No major events overnight.   Denies having chest pain, palpitations, shortness of breath, orthopnea/PND, cough, or dizziness at the time of this visit.      Past Medical History:  Past Medical History:   Diagnosis Date    COPD (chronic obstructive pulmonary disease) (HCC)         Past Surgical History:    has a past surgical history that  includes Tubal ligation;  section; Bronchoscopy (3/5/2025); bronchoscopy (N/A, 3/5/2025); and bronchoscopy (N/A, 3/5/2025).     Social History:  Reviewed.  reports that she quit smoking about 2 years ago. Her smoking use included cigarettes. She started smoking about 45 years ago. She has a 32.3 pack-year smoking history. She has never used smokeless tobacco. She reports that she does not drink alcohol and does not use drugs.     Allergies:  No Known Allergies    Family History:  Reviewed. family history includes Cancer in her father; Heart Disease in her brother and sister; Stroke in her mother. Denies family history of sudden cardiac death, arrhythmia, premature CAD    Home Meds:  Prior to Visit Medications    Medication Sig Taking? Authorizing Provider   ALPRAZolam (XANAX) 0.5 MG tablet Take 1 tablet by mouth 2 times daily as needed for Sleep. Max Daily Amount: 1 mg Yes Erica Fuentes MD   TRELEGY ELLIPTA 100-62.5-25 MCG/ACT AEPB inhaler Inhale 1 puff into the lungs daily Yes Erica Fuentes MD   azithromycin (ZITHROMAX) 250 MG tablet Take 1 tablet by mouth three times a week  Patient taking differently: Take 1 tablet by mouth three times a week Monday, Wednesday, Friday. Yes Nargis Kurtz MD   escitalopram (LEXAPRO) 20 MG tablet Take 1 tablet by mouth daily Yes Erica Fuentes MD   albuterol sulfate HFA (PROVENTIL;VENTOLIN;PROAIR) 108 (90 Base) MCG/ACT inhaler INHALE 2 PUFFS INTO THE LUNGS EVERY 6 HOURS AS NEEDED FOR WHEEZING Yes Nargis Kurtz MD   albuterol (PROVENTIL) (2.5 MG/3ML) 0.083% nebulizer solution Take 3 mLs by nebulization every 6 hours as needed for Wheezing Dx: COPD   ICD-10: J44.9 Yes Nargis Kurtz MD   amitriptyline (ELAVIL) 25 MG tablet Take 1 tablet by mouth nightly Yes Erica Fuentes MD   fluticasone-salmeterol (ADVAIR HFA) 230-21 MCG/ACT inhaler Inhale 2 puffs into the lungs 2 times daily  Patient not taking: Reported on 4/10/2025  Nargis Kurtz MD   SPIRIVA  estimated EF of 30 - 35%. Left ventricle size is normal. Normal wall thickness. There are regional wall motion abnormalities. Indeterminate diastolic function due to poor image quality.    Right Ventricle: Right ventricle size is normal. Normal systolic function.    Tricuspid Valve: Unable to assess RVSP.    Pericardium: Trivial localized pericardial effusion present around the right ventricle. No indication of cardiac tamponade.    IVC/SVC: IVC diameter is less than or equal to 21 mm and decreases greater than 50% during inspiration; therefore the estimated right atrial pressure is normal (~3 mmHg).      CTPE: 4/10/2025  IMPRESSION:  No evidence of pulmonary embolism or acute pulmonary abnormality.     Severe pulmonary emphysematous changes are seen.    Problem List:   Patient Active Problem List    Diagnosis Date Noted    Wide-complex tachycardia 04/12/2025    Systolic CHF, chronic (HCC) 04/12/2025    Rhinovirus infection 04/12/2025    Acute on chronic respiratory failure with hypoxia and hypercapnia 03/05/2025    Personal history of tobacco use 03/05/2025    Severe malnutrition 02/27/2025    Acute respiratory failure with hypoxia and hypercapnia 02/25/2025    COPD with acute exacerbation (HCC) 02/25/2025    Pneumonia involving left lung 02/25/2025    HTN (hypertension) 02/25/2025    Controlled type 2 diabetes mellitus, with long-term current use of insulin (HCC) 02/25/2025    Atrial tachycardia 02/25/2025    Former smoker 05/31/2023    SOB (shortness of breath) 01/05/2022    COPD, severe (HCC) 01/05/2022    Centrilobular emphysema (HCC) 01/05/2022    Pulmonary nodule 01/05/2022    Chronic hypoxemic respiratory failure 01/05/2022        Assessment and Plan:     Rhinovirus   Acute on chronic respiratory failure   COPD exc   ~ chronically on 3L of O2   ~ intermittent bipap  ~ nebs, steroids, chest vest therapy, acapella per pulmonary     WCT  ~ 20 beats WCT, suspicious for NSVT after nebulizer treatment   ~

## 2025-04-13 NOTE — PROGRESS NOTES
04/13/25 0634   RT Protocol   History Pulmonary Disease 2   Respiratory pattern 8   Breath sounds 2   Cough 1   Indications for Bronchodilator Therapy On home bronchodilators   Bronchodilator Assessment Score 13

## 2025-04-13 NOTE — PROGRESS NOTES
[facility]    4/13/2025 7:33 AM        Name: Neena Mejia .              Admitted: 4/10/2025  Primary Care Provider: Haim Malagon DO (Tel: [phone])      Subjective:  .    Seen this am while on 4 liters NC  She looks much better  Harsh moist non productive cough noted.  Lungs with increased aeration today,  mild wheezing    She feels \"better\"  Started on Vest therapy on Saturday   No current reports of chest pain   We reviewed labs     Human Rhinovirus/Enterovirus by PCR Abnormal           Reviewed interval ancillary notes    Current Medications  predniSONE (DELTASONE) tablet 40 mg, Daily  ALPRAZolam (XANAX) tablet 0.5 mg, Q6H PRN  levalbuterol (XOPENEX) nebulizer solution 1.25 mg, 4x Daily RT  guaiFENesin (MUCINEX) extended release tablet 600 mg, BID  lisinopril (PRINIVIL;ZESTRIL) tablet 2.5 mg, Daily  arformoterol tartrate (BROVANA) nebulizer solution 15 mcg, BID RT  budesonide (PULMICORT) nebulizer suspension 500 mcg, BID RT  azithromycin (ZITHROMAX) tablet 500 mg, Daily  albuterol (PROVENTIL) (2.5 MG/3ML) 0.083% nebulizer solution 2.5 mg, Q6H PRN  amitriptyline (ELAVIL) tablet 25 mg, Nightly  escitalopram (LEXAPRO) tablet 20 mg, Daily  sodium chloride flush 0.9 % injection 5-40 mL, 2 times per day  sodium chloride flush 0.9 % injection 5-40 mL, PRN  0.9 % sodium chloride infusion, PRN  ondansetron (ZOFRAN-ODT) disintegrating tablet 4 mg, Q8H PRN   Or  ondansetron (ZOFRAN) injection 4 mg, Q6H PRN  polyethylene glycol (GLYCOLAX) packet 17 g, Daily PRN  enoxaparin (LOVENOX) injection 40 mg, Daily  acetaminophen (TYLENOL) tablet 650 mg, Q6H PRN   Or  acetaminophen (TYLENOL) suppository 650 mg, Q6H PRN  guaiFENesin-dextromethorphan (ROBITUSSIN DM) 100-10 MG/5ML syrup 5 mL, Q4H PRN  benzonatate (TESSALON) capsule 100 mg, TID PRN        Objective:  BP (!) 89/64   Pulse 78   Temp 97.8 °F (36.6 °C) (Axillary)   Resp 15   Ht 1.6 m (5' 3\")   Wt 49.8 kg (109 lb  11.2 oz)   SpO2 97%   BMI 19.43 kg/m²     Intake/Output Summary (Last 24 hours) at 4/13/2025 0733  Last data filed at 4/12/2025 0924  Gross per 24 hour   Intake 10 ml   Output --   Net 10 ml      Wt Readings from Last 3 Encounters:   04/12/25 49.8 kg (109 lb 11.2 oz)   03/13/25 48.5 kg (107 lb)   03/07/25 48.8 kg (107 lb 9.4 oz)       General appearance:  Appears comfortable She is alert and very pleasant, thin, looks better today   Eyes: Sclera clear. Pupils equal.  ENT: Moist oral mucosa. Trachea midline, no adenopathy.  Cardiovascular: Regular rhythm, normal S1, S2. No murmur. No edema in lower extremities  Respiratory: NO  accessory muscles use noted.  Barky cough noted.  Scattered wheezes are present   GI: Abdomen soft, no tenderness, not distended, normal bowel sounds  Musculoskeletal: No cyanosis in digits, neck supple  Neurology: CN 2-12 grossly intact. No speech or motor deficits  Psych:. Alert and oriented in time, place and person  Skin: Warm, dry, normal turgor    Labs and Tests:  CBC:   Recent Labs     04/10/25  1559 04/11/25  0447 04/12/25  0812   WBC 7.0 5.9 9.4   HGB 14.1 14.2 14.5    313 313     BMP:    Recent Labs     04/10/25  1559 04/10/25  1715 04/11/25  0447 04/12/25  0812     --  140 139   K see below 4.3 4.7 4.8   CL 92*  --  94* 93*   CO2 37*  --  37* 36*   BUN 7  --  10 16   CREATININE 0.3*  --  0.3* 0.4*   GLUCOSE 133*  --  120* 127*     Hepatic:   Recent Labs     04/11/25 0447   AST 20   ALT 9*   BILITOT <0.2   ALKPHOS 68     CTPA chest:    IMPRESSION:  No evidence of pulmonary embolism or acute pulmonary abnormality.     Severe pulmonary emphysematous changes are seen.      Chest xray:  IMPRESSION:  1. No acute cardiopulmonary process.  2. Emphysema and COPD.     Echo:  2/26/25:    Left Ventricle: Moderately reduced left ventricular systolic function with a visually estimated EF of 30 - 35%. Left ventricle size is normal. Normal wall thickness. There are regional wall

## 2025-04-14 PROBLEM — I42.9 CARDIOMYOPATHY: Status: ACTIVE | Noted: 2025-04-14

## 2025-04-14 LAB
ALBUMIN SERPL-MCNC: 3.9 G/DL (ref 3.4–5)
ANION GAP SERPL CALCULATED.3IONS-SCNC: 6 MMOL/L (ref 3–16)
BUN SERPL-MCNC: 14 MG/DL (ref 7–20)
CALCIUM SERPL-MCNC: 9.1 MG/DL (ref 8.3–10.6)
CHLORIDE SERPL-SCNC: 95 MMOL/L (ref 99–110)
CO2 SERPL-SCNC: 36 MMOL/L (ref 21–32)
CREAT SERPL-MCNC: 0.4 MG/DL (ref 0.6–1.2)
GFR SERPLBLD CREATININE-BSD FMLA CKD-EPI: >90 ML/MIN/{1.73_M2}
GLUCOSE SERPL-MCNC: 92 MG/DL (ref 70–99)
MAGNESIUM SERPL-MCNC: 2.01 MG/DL (ref 1.8–2.4)
PHOSPHATE SERPL-MCNC: 3.1 MG/DL (ref 2.5–4.9)
POTASSIUM SERPL-SCNC: 3.5 MMOL/L (ref 3.5–5.1)
SODIUM SERPL-SCNC: 137 MMOL/L (ref 136–145)

## 2025-04-14 PROCEDURE — 6360000002 HC RX W HCPCS: Performed by: INTERNAL MEDICINE

## 2025-04-14 PROCEDURE — 6370000000 HC RX 637 (ALT 250 FOR IP): Performed by: INTERNAL MEDICINE

## 2025-04-14 PROCEDURE — 6370000000 HC RX 637 (ALT 250 FOR IP): Performed by: NURSE PRACTITIONER

## 2025-04-14 PROCEDURE — 80069 RENAL FUNCTION PANEL: CPT

## 2025-04-14 PROCEDURE — 6370000000 HC RX 637 (ALT 250 FOR IP)

## 2025-04-14 PROCEDURE — 99233 SBSQ HOSP IP/OBS HIGH 50: CPT | Performed by: INTERNAL MEDICINE

## 2025-04-14 PROCEDURE — 94761 N-INVAS EAR/PLS OXIMETRY MLT: CPT

## 2025-04-14 PROCEDURE — 92526 ORAL FUNCTION THERAPY: CPT

## 2025-04-14 PROCEDURE — 94640 AIRWAY INHALATION TREATMENT: CPT

## 2025-04-14 PROCEDURE — 36415 COLL VENOUS BLD VENIPUNCTURE: CPT

## 2025-04-14 PROCEDURE — 2060000000 HC ICU INTERMEDIATE R&B

## 2025-04-14 PROCEDURE — 94669 MECHANICAL CHEST WALL OSCILL: CPT

## 2025-04-14 PROCEDURE — 2700000000 HC OXYGEN THERAPY PER DAY

## 2025-04-14 PROCEDURE — 2500000003 HC RX 250 WO HCPCS

## 2025-04-14 PROCEDURE — 83735 ASSAY OF MAGNESIUM: CPT

## 2025-04-14 PROCEDURE — 6360000002 HC RX W HCPCS

## 2025-04-14 PROCEDURE — 99233 SBSQ HOSP IP/OBS HIGH 50: CPT | Performed by: NURSE PRACTITIONER

## 2025-04-14 PROCEDURE — 6360000002 HC RX W HCPCS: Performed by: STUDENT IN AN ORGANIZED HEALTH CARE EDUCATION/TRAINING PROGRAM

## 2025-04-14 PROCEDURE — 92610 EVALUATE SWALLOWING FUNCTION: CPT

## 2025-04-14 PROCEDURE — 94660 CPAP INITIATION&MGMT: CPT

## 2025-04-14 RX ADMIN — GUAIFENESIN 600 MG: 600 TABLET, EXTENDED RELEASE ORAL at 08:35

## 2025-04-14 RX ADMIN — ESCITALOPRAM OXALATE 20 MG: 10 TABLET ORAL at 08:35

## 2025-04-14 RX ADMIN — PREDNISONE 40 MG: 20 TABLET ORAL at 08:36

## 2025-04-14 RX ADMIN — Medication 10 ML: at 08:37

## 2025-04-14 RX ADMIN — LEVALBUTEROL HYDROCHLORIDE 1.25 MG: 1.25 SOLUTION RESPIRATORY (INHALATION) at 20:36

## 2025-04-14 RX ADMIN — LEVALBUTEROL HYDROCHLORIDE 1.25 MG: 1.25 SOLUTION RESPIRATORY (INHALATION) at 12:02

## 2025-04-14 RX ADMIN — GUAIFENESIN 600 MG: 600 TABLET, EXTENDED RELEASE ORAL at 21:25

## 2025-04-14 RX ADMIN — GUAIFENESIN AND DEXTROMETHORPHAN 5 ML: 100; 10 SYRUP ORAL at 21:26

## 2025-04-14 RX ADMIN — Medication 10 ML: at 21:26

## 2025-04-14 RX ADMIN — ARFORMOTEROL TARTRATE 15 MCG: 15 SOLUTION RESPIRATORY (INHALATION) at 09:17

## 2025-04-14 RX ADMIN — BUDESONIDE 500 MCG: 0.5 INHALANT RESPIRATORY (INHALATION) at 20:36

## 2025-04-14 RX ADMIN — LEVALBUTEROL HYDROCHLORIDE 1.25 MG: 1.25 SOLUTION RESPIRATORY (INHALATION) at 16:03

## 2025-04-14 RX ADMIN — ALPRAZOLAM 0.5 MG: 0.5 TABLET ORAL at 15:08

## 2025-04-14 RX ADMIN — ALPRAZOLAM 0.5 MG: 0.5 TABLET ORAL at 21:26

## 2025-04-14 RX ADMIN — ENOXAPARIN SODIUM 40 MG: 100 INJECTION SUBCUTANEOUS at 08:37

## 2025-04-14 RX ADMIN — ALPRAZOLAM 0.5 MG: 0.5 TABLET ORAL at 08:36

## 2025-04-14 RX ADMIN — BUDESONIDE 500 MCG: 0.5 INHALANT RESPIRATORY (INHALATION) at 09:17

## 2025-04-14 RX ADMIN — ARFORMOTEROL TARTRATE 15 MCG: 15 SOLUTION RESPIRATORY (INHALATION) at 20:36

## 2025-04-14 RX ADMIN — LEVALBUTEROL HYDROCHLORIDE 1.25 MG: 1.25 SOLUTION RESPIRATORY (INHALATION) at 09:18

## 2025-04-14 RX ADMIN — LISINOPRIL 2.5 MG: 5 TABLET ORAL at 08:35

## 2025-04-14 RX ADMIN — AMITRIPTYLINE HYDROCHLORIDE 25 MG: 25 TABLET ORAL at 21:25

## 2025-04-14 ASSESSMENT — PAIN SCALES - GENERAL
PAINLEVEL_OUTOF10: 0

## 2025-04-14 NOTE — PLAN OF CARE
Problem: Safety - Adult  Goal: Free from fall injury  Outcome: Progressing  Flowsheets (Taken 4/13/2025 2235 by Delfina Davis RN)  Free From Fall Injury: Instruct family/caregiver on patient safety  Note: Pt remains free from falls. Safety precautions in place. Bed in lowest position, bed wheels locked, call light with in reach, bed alarm on, yellow blanket in place, fall risk wrist band on, SAFE outside of doorway. Will continue to monitor.

## 2025-04-14 NOTE — PROGRESS NOTES
[facility]    4/14/2025 11:15 AM        Name: Neena Mejia .              Admitted: 4/10/2025  Primary Care Provider: Haim Malagon DO (Tel: [phone])      Subjective:  .    Seen this am while on 4 liters NC  She is trying to eat breakfast but reports it takes her \"a lot of work to eat and breath\"     She looks  better, but is distraught over slow progress.  Asking if a bronch would help    She does not feel that vest therapy is helping         Human Rhinovirus/Enterovirus by PCR Abnormal           Reviewed interval ancillary notes    Current Medications  predniSONE (DELTASONE) tablet 40 mg, Daily  ALPRAZolam (XANAX) tablet 0.5 mg, Q6H PRN  levalbuterol (XOPENEX) nebulizer solution 1.25 mg, 4x Daily RT  guaiFENesin (MUCINEX) extended release tablet 600 mg, BID  lisinopril (PRINIVIL;ZESTRIL) tablet 2.5 mg, Daily  arformoterol tartrate (BROVANA) nebulizer solution 15 mcg, BID RT  budesonide (PULMICORT) nebulizer suspension 500 mcg, BID RT  albuterol (PROVENTIL) (2.5 MG/3ML) 0.083% nebulizer solution 2.5 mg, Q6H PRN  amitriptyline (ELAVIL) tablet 25 mg, Nightly  escitalopram (LEXAPRO) tablet 20 mg, Daily  sodium chloride flush 0.9 % injection 5-40 mL, 2 times per day  sodium chloride flush 0.9 % injection 5-40 mL, PRN  0.9 % sodium chloride infusion, PRN  ondansetron (ZOFRAN-ODT) disintegrating tablet 4 mg, Q8H PRN   Or  ondansetron (ZOFRAN) injection 4 mg, Q6H PRN  polyethylene glycol (GLYCOLAX) packet 17 g, Daily PRN  enoxaparin (LOVENOX) injection 40 mg, Daily  acetaminophen (TYLENOL) tablet 650 mg, Q6H PRN   Or  acetaminophen (TYLENOL) suppository 650 mg, Q6H PRN  guaiFENesin-dextromethorphan (ROBITUSSIN DM) 100-10 MG/5ML syrup 5 mL, Q4H PRN  benzonatate (TESSALON) capsule 100 mg, TID PRN        Objective:  BP (!) 148/80   Pulse 99   Temp 98.2 °F (36.8 °C) (Oral)   Resp 21   Ht 1.6 m (5' 3\")   Wt 50.5 kg (111 lb 6.4 oz)   SpO2 92%   BMI 19.73 kg/m²

## 2025-04-14 NOTE — PROGRESS NOTES
[facility]    4/15/2025 12:14 PM        Name: Neena Mejia .              Admitted: 4/10/2025  Primary Care Provider: Haim Malagon DO (Tel: [phone])      Chief complaint: 63 yo female with hx of COPD, chronic respiratory failure on 3 L oxygen presented with shortness of breath, admitted with COPD exacerbation.      Positive for human rhinovirus/enterovirus.    Subjective:  Up in bedside chair. Reports she feels much better today, breathing greatly improved. O2 currently at 1.5 liters with sats 93%. Denies chest pain, abdominal pain, nausea. Cardiology planning left and right heart cath tomorrow.    Reviewed interval ancillary notes    Current Medications  metoprolol succinate (TOPROL XL) extended release tablet 25 mg, Daily  aspirin EC tablet 81 mg, Daily  rosuvastatin (CRESTOR) tablet 20 mg, Nightly  ALPRAZolam (XANAX) tablet 0.5 mg, Q6H PRN  levalbuterol (XOPENEX) nebulizer solution 1.25 mg, 4x Daily RT  guaiFENesin (MUCINEX) extended release tablet 600 mg, BID  lisinopril (PRINIVIL;ZESTRIL) tablet 2.5 mg, Daily  arformoterol tartrate (BROVANA) nebulizer solution 15 mcg, BID RT  budesonide (PULMICORT) nebulizer suspension 500 mcg, BID RT  albuterol (PROVENTIL) (2.5 MG/3ML) 0.083% nebulizer solution 2.5 mg, Q6H PRN  amitriptyline (ELAVIL) tablet 25 mg, Nightly  escitalopram (LEXAPRO) tablet 20 mg, Daily  sodium chloride flush 0.9 % injection 5-40 mL, 2 times per day  sodium chloride flush 0.9 % injection 5-40 mL, PRN  0.9 % sodium chloride infusion, PRN  ondansetron (ZOFRAN-ODT) disintegrating tablet 4 mg, Q8H PRN   Or  ondansetron (ZOFRAN) injection 4 mg, Q6H PRN  polyethylene glycol (GLYCOLAX) packet 17 g, Daily PRN  enoxaparin (LOVENOX) injection 40 mg, Daily  acetaminophen (TYLENOL) tablet 650 mg, Q6H PRN   Or  acetaminophen (TYLENOL) suppository 650 mg, Q6H PRN  guaiFENesin-dextromethorphan  (ROBITUSSIN DM) 100-10 MG/5ML syrup 5 mL, Q4H PRN  benzonatate (TESSALON) capsule 100 mg, TID PRN        Objective:  BP (!) 145/83   Pulse 84   Temp 98.5 °F (36.9 °C) (Oral)   Resp 17   Ht 1.6 m (5' 3\")   Wt 52.8 kg (116 lb 4.8 oz)   SpO2 94%   BMI 20.60 kg/m²     Intake/Output Summary (Last 24 hours) at 4/15/2025 1214  Last data filed at 4/14/2025 2142  Gross per 24 hour   Intake 237 ml   Output --   Net 237 ml      Wt Readings from Last 3 Encounters:   04/15/25 52.8 kg (116 lb 4.8 oz)   03/13/25 48.5 kg (107 lb)   03/07/25 48.8 kg (107 lb 9.4 oz)     General:  Awake, alert, oriented in NAD  Skin:  Warm and dry.  No unusual bruising or rash  Neck:  Supple.  No JVD or carotid bruit appreciated  Chest:  Normal effort.  Clear to auscultation, no wheezes/rhonchi/rales  Cardiovascular:  RRR, normal S1/S2, no murmur/gallop/rub  Abdomen:  Soft, nontender, +bowel sounds  Extremities:  No edema  Neurological: No focal deficits  Psychological: Normal mood and affect    Labs and Tests:  CBC:   Recent Labs     04/12/25  0812   WBC 9.4   HGB 14.5        BMP:    Recent Labs     04/14/25  0450      K 3.5   CL 95*   CO2 36*   BUN 14   CREATININE 0.4*   GLUCOSE 92     Hepatic: No results for input(s): \"AST\", \"ALT\", \"BILITOT\", \"ALKPHOS\" in the last 72 hours.    Invalid input(s): \"ALB\"    Imaging  CT CHEST PULMONARY EMBOLISM W CONTRAST   Final Result   No evidence of pulmonary embolism or acute pulmonary abnormality.      Severe pulmonary emphysematous changes are seen.         XR CHEST PORTABLE   Final Result   1. No acute cardiopulmonary process.   2. Emphysema and COPD.               Echo 2/26/25:    Left Ventricle: Moderately reduced left ventricular systolic function with a visually estimated EF of 30 - 35%. Left ventricle size is normal. Normal wall thickness. There are regional wall motion abnormalities. Indeterminate diastolic function due to poor image quality.    Right Ventricle: Right ventricle size is

## 2025-04-14 NOTE — PROGRESS NOTES
[facility]   Cardiology Progress Note     Date: 4/14/2025  Admit Date: 4/10/2025     Reason for consultation:     Chief Complaint   Patient presents with    Shortness of Breath     Ems report: pt. On CPAP, on arrival pt. On chronic 3L NC at 80%, improved with CPAP, history of COPD, pt. Reports SOB since Monday.        History of Present Illness: History obtained from patient and medical record.     Neena Mejia is a 64 y.o. female who presented to the ED by EMS for shortness of breath on 4/10/25.  She has COPD  and is on 3 liters oxygen at home.  She was placed on CPAP by EMS.  She was 80% on her pulse ox.  Denies fever, chills.  Denies cough.  Denies nausea and vomiting.  Since admission, she has been diagnosed with rhinovirus infection.  She has been getting nebulizer treatments for her shortness of breath and is also on BIPAP.  Today after a nebulizer treatment, she had a run of VT, wide complex of around 20 beats.  The patient admitted to sensing palpitations about the same time.  She has had similar sensation after nebulizer treatments while in the hospital.  She has had multiple admissions this year for COPD exacerbations.     She has no previous cardiac history.  Denies chest pains   we are consulted for wide complex tachycardia.  Her labs show a potassium of 4.8 today.  Magnesium is 2.14.  no anemia. (per consult note)    Interval Hx: Today, she is up in chair on NC. States still hard to breath. Better on bipap. States her anxiety makes breathing worse. Tele stable.     Patient seen and examined. Clinical notes reviewed. Telemetry reviewed.  No new complaints today. No major events overnight.   Denies having chest pain, palpitations, shortness of breath, orthopnea/PND, cough, or dizziness at the time of this visit.      Past Medical History:  Past Medical History:   Diagnosis Date    COPD (chronic obstructive pulmonary disease) (HCC)         Past Surgical History:    has a past surgical history

## 2025-04-14 NOTE — PLAN OF CARE
Problem: Chronic Conditions and Co-morbidities  Goal: Patient's chronic conditions and co-morbidity symptoms are monitored and maintained or improved  4/14/2025 1805 by Chica Willard RN  Outcome: Progressing  4/14/2025 1126 by Octavio Haro RN  Outcome: Progressing     Problem: Discharge Planning  Goal: Discharge to home or other facility with appropriate resources  4/14/2025 1805 by Chica Willard RN  Outcome: Progressing  4/14/2025 1126 by Octavio Haro RN  Outcome: Progressing     Problem: Safety - Adult  Goal: Free from fall injury  4/14/2025 1805 by Chica Willard RN  Outcome: Progressing  4/14/2025 1126 by Octavio Haro RN  Outcome: Progressing  Flowsheets (Taken 4/13/2025 2235 by Delfina Davis, RN)  Free From Fall Injury: Instruct family/caregiver on patient safety  Note: Pt remains free from falls. Safety precautions in place. Bed in lowest position, bed wheels locked, call light with in reach, bed alarm on, yellow blanket in place, fall risk wrist band on, SAFE outside of doorway. Will continue to monitor.     Problem: Skin/Tissue Integrity  Goal: Skin integrity remains intact  Description: 1.  Monitor for areas of redness and/or skin breakdown  2.  Assess vascular access sites hourly  3.  Every 4-6 hours minimum:  Change oxygen saturation probe site  4.  Every 4-6 hours:  If on nasal continuous positive airway pressure, respiratory therapy assess nares and determine need for appliance change or resting period  4/14/2025 1805 by Chica Willard RN  Outcome: Progressing  4/14/2025 1126 by Octaivo Haro RN  Outcome: Progressing  Flowsheets (Taken 4/13/2025 2235 by Delfina Davis, RN)  Skin Integrity Remains Intact: Monitor for areas of redness and/or skin breakdown

## 2025-04-14 NOTE — PROGRESS NOTES
PULMONARY AND CRITICAL CARE MEDICINE PROGRESS NOTE      SUBJECTIVE: Patient continues to have shortness of breath and cough and wheezing.  Has been wearing BiPAP for the most part during the day and night.    REVIEW OF SYSTEMS:   CONSTITUTIONAL SYMPTOMS: The patient denies fever, fatigue, night sweats, weight loss or weight gain.   HEENT: No vision changes. No tinnitus, Denies sinus pain. No hoarseness, or dysphagia.   CARDIOVASCULAR: Denies chest pain, palpitation, syncope.  RESPIRATORY: See above  GASTROINTESTINAL: Denies nausea, abdominal pain or change in bowel function.  SKIN: No rashes or itching.   MUSCULOSKELETAL: Denies weakness or bone pain.   NEUROLOGICAL: No headaches or seizures.     MEDICATIONS:      predniSONE  40 mg Oral Daily    levalbuterol  1.25 mg Nebulization 4x Daily RT    guaiFENesin  600 mg Oral BID    lisinopril  2.5 mg Oral Daily    arformoterol tartrate  15 mcg Nebulization BID RT    budesonide  0.5 mg Nebulization BID RT    amitriptyline  25 mg Oral Nightly    escitalopram  20 mg Oral Daily    sodium chloride flush  5-40 mL IntraVENous 2 times per day    enoxaparin  40 mg SubCUTAneous Daily      sodium chloride       ALPRAZolam, albuterol, sodium chloride flush, sodium chloride, ondansetron **OR** ondansetron, polyethylene glycol, acetaminophen **OR** acetaminophen, guaiFENesin-dextromethorphan, benzonatate     ALLERGIES:   Allergies as of 04/10/2025    (No Known Allergies)        OBJECTIVE:   height is 1.6 m (5' 3\") and weight is 50.5 kg (111 lb 6.4 oz). Her oral temperature is 98.2 °F (36.8 °C). Her blood pressure is 148/80 (abnormal) and her pulse is 99. Her respiration is 21 and oxygen saturation is 92%.   No intake/output data recorded.     PHYSICAL EXAM:  CONSTITUTIONAL: She is a 64 y.o.-year-old who appears her stated age. She is alert and oriented x 3 and in no acute distress.   CARDIOVASCULAR: S1 S2 RRR. Without murmer  RESPIRATORY & CHEST: Bilateral expiratory wheezing, no

## 2025-04-15 PROCEDURE — 6360000002 HC RX W HCPCS

## 2025-04-15 PROCEDURE — 99223 1ST HOSP IP/OBS HIGH 75: CPT | Performed by: INTERNAL MEDICINE

## 2025-04-15 PROCEDURE — 6370000000 HC RX 637 (ALT 250 FOR IP): Performed by: NURSE PRACTITIONER

## 2025-04-15 PROCEDURE — 6370000000 HC RX 637 (ALT 250 FOR IP)

## 2025-04-15 PROCEDURE — 6360000002 HC RX W HCPCS: Performed by: STUDENT IN AN ORGANIZED HEALTH CARE EDUCATION/TRAINING PROGRAM

## 2025-04-15 PROCEDURE — 6370000000 HC RX 637 (ALT 250 FOR IP): Performed by: INTERNAL MEDICINE

## 2025-04-15 PROCEDURE — 94640 AIRWAY INHALATION TREATMENT: CPT

## 2025-04-15 PROCEDURE — 94669 MECHANICAL CHEST WALL OSCILL: CPT

## 2025-04-15 PROCEDURE — 94660 CPAP INITIATION&MGMT: CPT

## 2025-04-15 PROCEDURE — 2060000000 HC ICU INTERMEDIATE R&B

## 2025-04-15 PROCEDURE — 6360000002 HC RX W HCPCS: Performed by: INTERNAL MEDICINE

## 2025-04-15 PROCEDURE — 92526 ORAL FUNCTION THERAPY: CPT

## 2025-04-15 PROCEDURE — 99233 SBSQ HOSP IP/OBS HIGH 50: CPT | Performed by: INTERNAL MEDICINE

## 2025-04-15 PROCEDURE — 94761 N-INVAS EAR/PLS OXIMETRY MLT: CPT

## 2025-04-15 PROCEDURE — 2700000000 HC OXYGEN THERAPY PER DAY

## 2025-04-15 PROCEDURE — 2500000003 HC RX 250 WO HCPCS

## 2025-04-15 RX ORDER — ASPIRIN 81 MG/1
81 TABLET ORAL DAILY
Status: DISCONTINUED | OUTPATIENT
Start: 2025-04-15 | End: 2025-04-18 | Stop reason: HOSPADM

## 2025-04-15 RX ORDER — METOPROLOL SUCCINATE 25 MG/1
25 TABLET, EXTENDED RELEASE ORAL DAILY
Status: DISCONTINUED | OUTPATIENT
Start: 2025-04-15 | End: 2025-04-18 | Stop reason: HOSPADM

## 2025-04-15 RX ORDER — ROSUVASTATIN CALCIUM 20 MG/1
20 TABLET, COATED ORAL NIGHTLY
Status: DISCONTINUED | OUTPATIENT
Start: 2025-04-15 | End: 2025-04-18 | Stop reason: HOSPADM

## 2025-04-15 RX ORDER — POTASSIUM CHLORIDE 1500 MG/1
20 TABLET, EXTENDED RELEASE ORAL ONCE
Status: COMPLETED | OUTPATIENT
Start: 2025-04-15 | End: 2025-04-15

## 2025-04-15 RX ADMIN — ARFORMOTEROL TARTRATE 15 MCG: 15 SOLUTION RESPIRATORY (INHALATION) at 20:30

## 2025-04-15 RX ADMIN — Medication 10 ML: at 21:24

## 2025-04-15 RX ADMIN — GUAIFENESIN AND DEXTROMETHORPHAN 5 ML: 100; 10 SYRUP ORAL at 09:11

## 2025-04-15 RX ADMIN — GUAIFENESIN 600 MG: 600 TABLET, EXTENDED RELEASE ORAL at 09:09

## 2025-04-15 RX ADMIN — PREDNISONE 40 MG: 20 TABLET ORAL at 09:10

## 2025-04-15 RX ADMIN — POTASSIUM CHLORIDE 20 MEQ: 1500 TABLET, EXTENDED RELEASE ORAL at 21:23

## 2025-04-15 RX ADMIN — LEVALBUTEROL HYDROCHLORIDE 1.25 MG: 1.25 SOLUTION RESPIRATORY (INHALATION) at 07:31

## 2025-04-15 RX ADMIN — AMITRIPTYLINE HYDROCHLORIDE 25 MG: 25 TABLET ORAL at 21:24

## 2025-04-15 RX ADMIN — ALPRAZOLAM 0.5 MG: 0.5 TABLET ORAL at 09:10

## 2025-04-15 RX ADMIN — LEVALBUTEROL HYDROCHLORIDE 1.25 MG: 1.25 SOLUTION RESPIRATORY (INHALATION) at 20:30

## 2025-04-15 RX ADMIN — ALPRAZOLAM 0.5 MG: 0.5 TABLET ORAL at 21:23

## 2025-04-15 RX ADMIN — ALPRAZOLAM 0.5 MG: 0.5 TABLET ORAL at 15:15

## 2025-04-15 RX ADMIN — BUDESONIDE 500 MCG: 0.5 INHALANT RESPIRATORY (INHALATION) at 20:30

## 2025-04-15 RX ADMIN — ASPIRIN 81 MG: 81 TABLET, COATED ORAL at 12:21

## 2025-04-15 RX ADMIN — METOPROLOL SUCCINATE 25 MG: 25 TABLET, EXTENDED RELEASE ORAL at 12:21

## 2025-04-15 RX ADMIN — ESCITALOPRAM OXALATE 20 MG: 10 TABLET ORAL at 09:09

## 2025-04-15 RX ADMIN — LISINOPRIL 2.5 MG: 5 TABLET ORAL at 09:09

## 2025-04-15 RX ADMIN — LEVALBUTEROL HYDROCHLORIDE 1.25 MG: 1.25 SOLUTION RESPIRATORY (INHALATION) at 12:23

## 2025-04-15 RX ADMIN — BUDESONIDE 500 MCG: 0.5 INHALANT RESPIRATORY (INHALATION) at 07:31

## 2025-04-15 RX ADMIN — ROSUVASTATIN 20 MG: 20 TABLET, FILM COATED ORAL at 21:23

## 2025-04-15 RX ADMIN — ARFORMOTEROL TARTRATE 15 MCG: 15 SOLUTION RESPIRATORY (INHALATION) at 07:31

## 2025-04-15 RX ADMIN — GUAIFENESIN 600 MG: 600 TABLET, EXTENDED RELEASE ORAL at 21:23

## 2025-04-15 RX ADMIN — LEVALBUTEROL HYDROCHLORIDE 1.25 MG: 1.25 SOLUTION RESPIRATORY (INHALATION) at 15:47

## 2025-04-15 RX ADMIN — ENOXAPARIN SODIUM 40 MG: 100 INJECTION SUBCUTANEOUS at 09:09

## 2025-04-15 RX ADMIN — Medication 10 ML: at 09:11

## 2025-04-15 NOTE — PROGRESS NOTES
PULMONARY AND CRITICAL CARE MEDICINE PROGRESS NOTE      SUBJECTIVE: Patient states improved shortness of breath today.  O2 has been weaned down to 1 L nasal cannula with which she is saturating well.  Improved cough.  Good airway clearance.    REVIEW OF SYSTEMS:   CONSTITUTIONAL SYMPTOMS: The patient denies fever, fatigue, night sweats, weight loss or weight gain.   HEENT: No vision changes. No tinnitus, Denies sinus pain. No hoarseness, or dysphagia.   CARDIOVASCULAR: Denies chest pain, palpitation, syncope.  RESPIRATORY: See above.   GASTROINTESTINAL: Denies nausea, abdominal pain or change in bowel function.  SKIN: No rashes or itching.   MUSCULOSKELETAL: Denies weakness or bone pain.   NEUROLOGICAL: No headaches or seizures.     MEDICATIONS:      metoprolol succinate  25 mg Oral Daily    aspirin  81 mg Oral Daily    rosuvastatin  20 mg Oral Nightly    levalbuterol  1.25 mg Nebulization 4x Daily RT    guaiFENesin  600 mg Oral BID    lisinopril  2.5 mg Oral Daily    arformoterol tartrate  15 mcg Nebulization BID RT    budesonide  0.5 mg Nebulization BID RT    amitriptyline  25 mg Oral Nightly    escitalopram  20 mg Oral Daily    sodium chloride flush  5-40 mL IntraVENous 2 times per day    enoxaparin  40 mg SubCUTAneous Daily      sodium chloride       ALPRAZolam, albuterol, sodium chloride flush, sodium chloride, ondansetron **OR** ondansetron, polyethylene glycol, acetaminophen **OR** acetaminophen, guaiFENesin-dextromethorphan, benzonatate     ALLERGIES:   Allergies as of 04/10/2025    (No Known Allergies)        OBJECTIVE:   height is 1.6 m (5' 3\") and weight is 52.8 kg (116 lb 4.8 oz). Her oral temperature is 98.5 °F (36.9 °C). Her blood pressure is 145/83 (abnormal) and her pulse is 84. Her respiration is 17 and oxygen saturation is 94%.   No intake/output data recorded.     PHYSICAL EXAM:  CONSTITUTIONAL: She is a 64 y.o.-year-old who appears her stated age. She is alert and oriented x 3 and in no acute  no fever and no chills.

## 2025-04-15 NOTE — PLAN OF CARE
Problem: Chronic Conditions and Co-morbidities  Goal: Patient's chronic conditions and co-morbidity symptoms are monitored and maintained or improved  4/15/2025 0228 by Rebecca Cowan, RN  Outcome: Progressing     Problem: Discharge Planning  Goal: Discharge to home or other facility with appropriate resources  4/15/2025 0228 by Rebecca Cowan, RN  Outcome: Progressing     Problem: Safety - Adult  Goal: Free from fall injury  4/15/2025 0228 by Rebecca Cowan, RN  Outcome: Progressing     Problem: Skin/Tissue Integrity  Goal: Skin integrity remains intact  Description: 1.  Monitor for areas of redness and/or skin breakdown  2.  Assess vascular access sites hourly  3.  Every 4-6 hours minimum:  Change oxygen saturation probe site  4.  Every 4-6 hours:  If on nasal continuous positive airway pressure, respiratory therapy assess nares and determine need for appliance change or resting period  4/15/2025 0228 by Rebecca Cowan, RN  Outcome: Progressing

## 2025-04-15 NOTE — PLAN OF CARE
Problem: Chronic Conditions and Co-morbidities  Goal: Patient's chronic conditions and co-morbidity symptoms are monitored and maintained or improved  Recent Flowsheet Documentation  Taken 4/15/2025 0910 by Tigist Gonzalez RN  Care Plan - Patient's Chronic Conditions and Co-Morbidity Symptoms are Monitored and Maintained or Improved: Monitor and assess patient's chronic conditions and comorbid symptoms for stability, deterioration, or improvement  4/15/2025 0228 by Rebecca Cowan RN  Outcome: Progressing     Problem: Discharge Planning  Goal: Discharge to home or other facility with appropriate resources  Recent Flowsheet Documentation  Taken 4/15/2025 0910 by Tigist Gonzalez RN  Discharge to home or other facility with appropriate resources: Identify barriers to discharge with patient and caregiver  4/15/2025 0228 by Rebecca Cowan RN  Outcome: Progressing     Problem: Safety - Adult  Goal: Free from fall injury  4/15/2025 0228 by Rebecca Cowan RN  Outcome: Progressing     Problem: Skin/Tissue Integrity  Goal: Skin integrity remains intact  Description: 1.  Monitor for areas of redness and/or skin breakdown  2.  Assess vascular access sites hourly  3.  Every 4-6 hours minimum:  Change oxygen saturation probe site  4.  Every 4-6 hours:  If on nasal continuous positive airway pressure, respiratory therapy assess nares and determine need for appliance change or resting period  Recent Flowsheet Documentation  Taken 4/15/2025 0910 by Tigist Gonzalez RN  Skin Integrity Remains Intact: Monitor for areas of redness and/or skin breakdown  4/15/2025 0228 by Rebecca Cowan RN  Outcome: Progressing

## 2025-04-15 NOTE — PROGRESS NOTES
Physical/Occupational Therapy  Neena Mejia  1960    PT/OT orders received, chart reviewed. Patient reporting that she is upset about the dysphagia diet. Patient requesting to complete PT/OT tomorrow. Will follow up as schedule allows.    Brenda Rosenbaum, PT, DPT [id number]  Sharita Ley, OTR/L [id number]

## 2025-04-15 NOTE — PROGRESS NOTES
Speech Language Pathology  [facility]  [phone]  SLP Dysphagia Treatment       Patient: Neena Mejia   : 1960   MRN: [id number]      Evaluation Date: 4/15/2025      Admitting Dx: COPD exacerbation (HCC) [J44.1]  Acute respiratory failure with hypoxia and hypercapnia [J96.01, J96.02]  Treatment Diagnosis: Oropharyngeal Dysphagia   Pain: Denies                                  Recommendations      Recommended Diet and Intervention 4/15/2025:  Diet Solids Recommendation:  Regular texture diet  Liquid Consistency Recommendation:  Thin liquids  Recommended form of Meds: Meds whole with water or Meds in puree    If respiratory status further declines (I.e., respiratory rate greater than 25, increased O2 requirement) place pt NPO and notify SLP         Compensatory strategies: Aspiration Precautions , Eat or Feed Slowly, Remain Upright 30-45 min , Small Bites and Sips , Upright as possible with all PO intake , Smaller more frequent meals, energy conservation with intake     Discharge Recommendations:  Discharge recommendations to be determined pending ongoing follow-up during acute care stay    History/Course of Treatment     H&P: This is a 63 y/o F w/ a hx of COPD, Tobacco use, on home oxygen therapy 3L, who presented to [facility] with SOB. Patient was found to be hypoxic by EMS on 3L she was placed on CPAP and brought to [facility]. ER work up was negative for COVID-19, Influenza, low procal, nl WBC, VBG 7.34/78/246. CT-PE negative for PE. She was admitted to the medical floor with AE-COPD and acute on chronic hypoxic / hypercapnic respiratory failure.    Imaging:  CT Chest:   IMPRESSION:  No evidence of pulmonary embolism or acute pulmonary abnormality.     Severe pulmonary emphysematous changes are seen.    History/Prior Level of Function:   Living Status: home   Prior Dysphagia History: None noted in chart review    Reason for referral: SLP evaluation orders received due

## 2025-04-15 NOTE — PROGRESS NOTES
[facility]  Progress ntoes  [phone]      Chief Complaint   Patient presents with    Shortness of Breath     Ems report: pt. On CPAP, on arrival pt. On chronic 3L NC at 80%, improved with CPAP, history of COPD, pt. Reports SOB since Monday.             History of Present Illness:  Neena Mejia is a 64 y.o. patient who presented to the hospital with complaints of shortness of breath. I have been asked to provide consultation regarding further management and testing.      Subjective: she reports that she is feeling better. No chest pain or pressure. 1L oxygen     Past Medical History:   has a past medical history of COPD (chronic obstructive pulmonary disease) (HCC).    Surgical History:   has a past surgical history that includes Tubal ligation;  section; Bronchoscopy (3/5/2025); bronchoscopy (N/A, 3/5/2025); and bronchoscopy (N/A, 3/5/2025).     Social History:   reports that she quit smoking about 2 years ago. Her smoking use included cigarettes. She started smoking about 45 years ago. She has a 32.3 pack-year smoking history. She has never used smokeless tobacco. She reports that she does not drink alcohol and does not use drugs.     Family History:  +family history of cad    Home Medications:  Were reviewed and are listed in nursing record. and/or listed below  Prior to Admission medications    Medication Sig Start Date End Date Taking? Authorizing Provider   ALPRAZolam (XANAX) 0.5 MG tablet Take 1 tablet by mouth 2 times daily as needed for Sleep. Max Daily Amount: 1 mg   Yes Provider, Historical, MD   TRELEGY ELLIPTA 100-62.5-25 MCG/ACT AEPB inhaler Inhale 1 puff into the lungs daily 3/6/25  Yes ProviderErica MD   azithromycin (ZITHROMAX) 250 MG tablet Take 1 tablet by mouth three times a week  Patient taking differently: Take 1 tablet by mouth three times a week Monday, Wednesday, Friday. 3/14/25  Yes Nargis Kurtz MD   escitalopram (LEXAPRO) 20 MG tablet Take 1 tablet by  premature cad, hyperlipidemia (), coronary calcifications on chest ct  Plan  - continue lisinopril  - start metoprolol succinate 25 mg. Not on sglt2 or spironolactone due to blood pressure. No indication for icd as has not been on gdmt for 90 days per acc guidelines   - npo for rhc and lhc tomorrow. Discussed risks benefits and alternatives  - check tsh, A1c, lipid   - aspirin and statin     3. Essential hypertension  - stable  Plan  - continue lisinopril       I will address the patient's cardiac risk factors and adjusted pharmacologic treatment as needed. In addition, I have reinforced the need for patient directed risk factor modification.    Tobacco use was discussed with the patient and educated on the negative effects. I have asked the patient to not utilize these agents.    Thank you for allowing to us to participate in the care or Neena Mejia. Further evaluation will be based upon the patient's clinical course and testing results.    All questions and concerns were addressed to the patient/family. Alternatives to my treatment were discussed. The note was completed using EMR. Every effort was made to ensure accuracy; however, inadvertent computerized transcription errors may be present.       All questions and concerns were addressed to the patient/family. Alternatives to my treatment were discussed. The note was completed using EMR. Every effort was made to ensure accuracy; however, inadvertent computerized transcription errors may be present.  JOSE A ALCARAZ DO, MD 4/15/2025 8:20 AM

## 2025-04-15 NOTE — PLAN OF CARE
Problem: Safety - Adult  Goal: Free from fall injury  4/15/2025 1556 by Tigist Gonzalez, RN  Outcome: Progressing  Flowsheets (Taken 4/15/2025 1556)  Free From Fall Injury: Instruct family/caregiver on patient safety  Note: Pt remains free from falls. Safety precautions in place. Bed in lowest position, bed wheels locked, call light with in reach, bed alarm on, yellow blanket in place, fall risk wrist band on, SAFE outside of doorway. Will continue to monitor.  4/15/2025 0228 by Rebecca Cowan, RN  Outcome: Progressing

## 2025-04-15 NOTE — PROGRESS NOTES
Weaned down on the oxygen to 1liter. Saturations are 95-96%. Denies feeling SOB. Rested well over night.

## 2025-04-16 PROBLEM — R07.9 CHEST PAIN: Status: ACTIVE | Noted: 2025-04-10

## 2025-04-16 LAB
ANION GAP SERPL CALCULATED.3IONS-SCNC: 8 MMOL/L (ref 3–16)
BILIRUB UR QL STRIP.AUTO: NEGATIVE
BUN SERPL-MCNC: 9 MG/DL (ref 7–20)
CALCIUM SERPL-MCNC: 8.8 MG/DL (ref 8.3–10.6)
CHLORIDE SERPL-SCNC: 95 MMOL/L (ref 99–110)
CHOLEST SERPL-MCNC: 220 MG/DL (ref 0–199)
CLARITY UR: CLEAR
CO2 SERPL-SCNC: 32 MMOL/L (ref 21–32)
COLOR UR: YELLOW
CREAT SERPL-MCNC: 0.4 MG/DL (ref 0.6–1.2)
DEPRECATED RDW RBC AUTO: 13.4 % (ref 12.4–15.4)
ECHO BSA: 1.62 M2
EST. AVERAGE GLUCOSE BLD GHB EST-MCNC: 91.1 MG/DL
GFR SERPLBLD CREATININE-BSD FMLA CKD-EPI: >90 ML/MIN/{1.73_M2}
GLUCOSE SERPL-MCNC: 93 MG/DL (ref 70–99)
GLUCOSE UR STRIP.AUTO-MCNC: NEGATIVE MG/DL
HBA1C MFR BLD: 4.8 %
HCT VFR BLD AUTO: 40.2 % (ref 36–48)
HDLC SERPL-MCNC: 55 MG/DL (ref 40–60)
HGB BLD-MCNC: 13.4 G/DL (ref 12–16)
HGB UR QL STRIP.AUTO: NEGATIVE
KETONES UR STRIP.AUTO-MCNC: NEGATIVE MG/DL
LDLC SERPL CALC-MCNC: 145 MG/DL
LEUKOCYTE ESTERASE UR QL STRIP.AUTO: NEGATIVE
MCH RBC QN AUTO: 29.3 PG (ref 26–34)
MCHC RBC AUTO-ENTMCNC: 33.4 G/DL (ref 31–36)
MCV RBC AUTO: 87.8 FL (ref 80–100)
NITRITE UR QL STRIP.AUTO: NEGATIVE
PH UR STRIP.AUTO: 8 [PH] (ref 5–8)
PLATELET # BLD AUTO: 266 K/UL (ref 135–450)
PMV BLD AUTO: 7.6 FL (ref 5–10.5)
POTASSIUM SERPL-SCNC: 4.1 MMOL/L (ref 3.5–5.1)
PROT UR STRIP.AUTO-MCNC: NEGATIVE MG/DL
RBC # BLD AUTO: 4.59 M/UL (ref 4–5.2)
SODIUM SERPL-SCNC: 135 MMOL/L (ref 136–145)
SP GR UR STRIP.AUTO: >=1.03 (ref 1–1.03)
TRIGL SERPL-MCNC: 101 MG/DL (ref 0–150)
TSH SERPL DL<=0.005 MIU/L-ACNC: 1.55 UIU/ML (ref 0.27–4.2)
UA COMPLETE W REFLEX CULTURE PNL UR: NORMAL
UA DIPSTICK W REFLEX MICRO PNL UR: NORMAL
URN SPEC COLLECT METH UR: NORMAL
UROBILINOGEN UR STRIP-ACNC: 1 E.U./DL
VLDLC SERPL CALC-MCNC: 20 MG/DL
WBC # BLD AUTO: 10.1 K/UL (ref 4–11)

## 2025-04-16 PROCEDURE — 6370000000 HC RX 637 (ALT 250 FOR IP): Performed by: INTERNAL MEDICINE

## 2025-04-16 PROCEDURE — 6370000000 HC RX 637 (ALT 250 FOR IP): Performed by: NURSE PRACTITIONER

## 2025-04-16 PROCEDURE — 97530 THERAPEUTIC ACTIVITIES: CPT

## 2025-04-16 PROCEDURE — 94669 MECHANICAL CHEST WALL OSCILL: CPT

## 2025-04-16 PROCEDURE — 2709999900 HC NON-CHARGEABLE SUPPLY: Performed by: INTERNAL MEDICINE

## 2025-04-16 PROCEDURE — 6370000000 HC RX 637 (ALT 250 FOR IP)

## 2025-04-16 PROCEDURE — 6360000002 HC RX W HCPCS: Performed by: INTERNAL MEDICINE

## 2025-04-16 PROCEDURE — 93458 L HRT ARTERY/VENTRICLE ANGIO: CPT | Performed by: INTERNAL MEDICINE

## 2025-04-16 PROCEDURE — 81003 URINALYSIS AUTO W/O SCOPE: CPT

## 2025-04-16 PROCEDURE — 85027 COMPLETE CBC AUTOMATED: CPT

## 2025-04-16 PROCEDURE — 2500000003 HC RX 250 WO HCPCS: Performed by: INTERNAL MEDICINE

## 2025-04-16 PROCEDURE — B2111ZZ FLUOROSCOPY OF MULTIPLE CORONARY ARTERIES USING LOW OSMOLAR CONTRAST: ICD-10-PCS | Performed by: INTERNAL MEDICINE

## 2025-04-16 PROCEDURE — 1200000000 HC SEMI PRIVATE

## 2025-04-16 PROCEDURE — C1894 INTRO/SHEATH, NON-LASER: HCPCS | Performed by: INTERNAL MEDICINE

## 2025-04-16 PROCEDURE — 2500000003 HC RX 250 WO HCPCS

## 2025-04-16 PROCEDURE — 36415 COLL VENOUS BLD VENIPUNCTURE: CPT

## 2025-04-16 PROCEDURE — 83036 HEMOGLOBIN GLYCOSYLATED A1C: CPT

## 2025-04-16 PROCEDURE — 2700000000 HC OXYGEN THERAPY PER DAY

## 2025-04-16 PROCEDURE — 4A023N7 MEASUREMENT OF CARDIAC SAMPLING AND PRESSURE, LEFT HEART, PERCUTANEOUS APPROACH: ICD-10-PCS | Performed by: INTERNAL MEDICINE

## 2025-04-16 PROCEDURE — 84443 ASSAY THYROID STIM HORMONE: CPT

## 2025-04-16 PROCEDURE — 94640 AIRWAY INHALATION TREATMENT: CPT

## 2025-04-16 PROCEDURE — 6360000004 HC RX CONTRAST MEDICATION: Performed by: INTERNAL MEDICINE

## 2025-04-16 PROCEDURE — 2580000003 HC RX 258: Performed by: INTERNAL MEDICINE

## 2025-04-16 PROCEDURE — C1769 GUIDE WIRE: HCPCS | Performed by: INTERNAL MEDICINE

## 2025-04-16 PROCEDURE — 99152 MOD SED SAME PHYS/QHP 5/>YRS: CPT | Performed by: INTERNAL MEDICINE

## 2025-04-16 PROCEDURE — 99153 MOD SED SAME PHYS/QHP EA: CPT | Performed by: INTERNAL MEDICINE

## 2025-04-16 PROCEDURE — 99233 SBSQ HOSP IP/OBS HIGH 50: CPT | Performed by: INTERNAL MEDICINE

## 2025-04-16 PROCEDURE — 6360000002 HC RX W HCPCS: Performed by: STUDENT IN AN ORGANIZED HEALTH CARE EDUCATION/TRAINING PROGRAM

## 2025-04-16 PROCEDURE — 80048 BASIC METABOLIC PNL TOTAL CA: CPT

## 2025-04-16 PROCEDURE — 5A0935A ASSISTANCE WITH RESPIRATORY VENTILATION, LESS THAN 24 CONSECUTIVE HOURS, HIGH NASAL FLOW/VELOCITY: ICD-10-PCS | Performed by: INTERNAL MEDICINE

## 2025-04-16 PROCEDURE — 94010 BREATHING CAPACITY TEST: CPT

## 2025-04-16 PROCEDURE — 97165 OT EVAL LOW COMPLEX 30 MIN: CPT

## 2025-04-16 PROCEDURE — 80061 LIPID PANEL: CPT

## 2025-04-16 PROCEDURE — 97162 PT EVAL MOD COMPLEX 30 MIN: CPT

## 2025-04-16 PROCEDURE — 94761 N-INVAS EAR/PLS OXIMETRY MLT: CPT

## 2025-04-16 RX ORDER — SODIUM CHLORIDE 9 MG/ML
INJECTION, SOLUTION INTRAVENOUS CONTINUOUS
Status: ACTIVE | OUTPATIENT
Start: 2025-04-16 | End: 2025-04-16

## 2025-04-16 RX ORDER — SODIUM CHLORIDE 9 MG/ML
INJECTION, SOLUTION INTRAVENOUS PRN
Status: DISCONTINUED | OUTPATIENT
Start: 2025-04-16 | End: 2025-04-18 | Stop reason: HOSPADM

## 2025-04-16 RX ORDER — FENTANYL CITRATE 50 UG/ML
INJECTION, SOLUTION INTRAMUSCULAR; INTRAVENOUS PRN
Status: DISCONTINUED | OUTPATIENT
Start: 2025-04-16 | End: 2025-04-16 | Stop reason: HOSPADM

## 2025-04-16 RX ORDER — IOPAMIDOL 755 MG/ML
INJECTION, SOLUTION INTRAVASCULAR PRN
Status: DISCONTINUED | OUTPATIENT
Start: 2025-04-16 | End: 2025-04-16 | Stop reason: HOSPADM

## 2025-04-16 RX ORDER — SODIUM CHLORIDE 0.9 % (FLUSH) 0.9 %
5-40 SYRINGE (ML) INJECTION PRN
Status: DISCONTINUED | OUTPATIENT
Start: 2025-04-16 | End: 2025-04-16 | Stop reason: HOSPADM

## 2025-04-16 RX ORDER — SODIUM CHLORIDE 0.9 % (FLUSH) 0.9 %
5-40 SYRINGE (ML) INJECTION PRN
Status: DISCONTINUED | OUTPATIENT
Start: 2025-04-16 | End: 2025-04-18 | Stop reason: HOSPADM

## 2025-04-16 RX ORDER — MIDAZOLAM HYDROCHLORIDE 1 MG/ML
INJECTION, SOLUTION INTRAMUSCULAR; INTRAVENOUS PRN
Status: DISCONTINUED | OUTPATIENT
Start: 2025-04-16 | End: 2025-04-16 | Stop reason: HOSPADM

## 2025-04-16 RX ORDER — SODIUM CHLORIDE 9 MG/ML
INJECTION, SOLUTION INTRAVENOUS PRN
Status: DISCONTINUED | OUTPATIENT
Start: 2025-04-16 | End: 2025-04-16 | Stop reason: HOSPADM

## 2025-04-16 RX ORDER — OXYCODONE AND ACETAMINOPHEN 5; 325 MG/1; MG/1
2 TABLET ORAL EVERY 4 HOURS PRN
Status: DISCONTINUED | OUTPATIENT
Start: 2025-04-16 | End: 2025-04-18 | Stop reason: HOSPADM

## 2025-04-16 RX ORDER — LIDOCAINE HYDROCHLORIDE 10 MG/ML
INJECTION, SOLUTION INFILTRATION; PERINEURAL PRN
Status: DISCONTINUED | OUTPATIENT
Start: 2025-04-16 | End: 2025-04-16 | Stop reason: HOSPADM

## 2025-04-16 RX ORDER — ACETAMINOPHEN 325 MG/1
650 TABLET ORAL EVERY 4 HOURS PRN
Status: DISCONTINUED | OUTPATIENT
Start: 2025-04-16 | End: 2025-04-18 | Stop reason: HOSPADM

## 2025-04-16 RX ORDER — SODIUM CHLORIDE 0.9 % (FLUSH) 0.9 %
5-40 SYRINGE (ML) INJECTION EVERY 12 HOURS SCHEDULED
Status: DISCONTINUED | OUTPATIENT
Start: 2025-04-16 | End: 2025-04-16 | Stop reason: HOSPADM

## 2025-04-16 RX ORDER — OXYCODONE AND ACETAMINOPHEN 5; 325 MG/1; MG/1
1 TABLET ORAL EVERY 4 HOURS PRN
Status: DISCONTINUED | OUTPATIENT
Start: 2025-04-16 | End: 2025-04-18 | Stop reason: HOSPADM

## 2025-04-16 RX ORDER — SODIUM CHLORIDE 0.9 % (FLUSH) 0.9 %
5-40 SYRINGE (ML) INJECTION EVERY 12 HOURS SCHEDULED
Status: DISCONTINUED | OUTPATIENT
Start: 2025-04-16 | End: 2025-04-18 | Stop reason: HOSPADM

## 2025-04-16 RX ADMIN — Medication 10 ML: at 19:55

## 2025-04-16 RX ADMIN — GUAIFENESIN AND DEXTROMETHORPHAN 5 ML: 100; 10 SYRUP ORAL at 13:34

## 2025-04-16 RX ADMIN — ASPIRIN 81 MG: 81 TABLET, COATED ORAL at 08:09

## 2025-04-16 RX ADMIN — LEVALBUTEROL HYDROCHLORIDE 1.25 MG: 1.25 SOLUTION RESPIRATORY (INHALATION) at 12:06

## 2025-04-16 RX ADMIN — Medication 10 ML: at 09:00

## 2025-04-16 RX ADMIN — PREDNISONE 30 MG: 20 TABLET ORAL at 08:06

## 2025-04-16 RX ADMIN — BUDESONIDE 500 MCG: 0.5 INHALANT RESPIRATORY (INHALATION) at 08:00

## 2025-04-16 RX ADMIN — BUDESONIDE 500 MCG: 0.5 INHALANT RESPIRATORY (INHALATION) at 20:16

## 2025-04-16 RX ADMIN — GUAIFENESIN 600 MG: 600 TABLET, EXTENDED RELEASE ORAL at 08:10

## 2025-04-16 RX ADMIN — LISINOPRIL 2.5 MG: 5 TABLET ORAL at 08:09

## 2025-04-16 RX ADMIN — ALPRAZOLAM 0.5 MG: 0.5 TABLET ORAL at 13:16

## 2025-04-16 RX ADMIN — ESCITALOPRAM OXALATE 20 MG: 10 TABLET ORAL at 08:06

## 2025-04-16 RX ADMIN — ROSUVASTATIN 20 MG: 20 TABLET, FILM COATED ORAL at 19:55

## 2025-04-16 RX ADMIN — LEVALBUTEROL HYDROCHLORIDE 1.25 MG: 1.25 SOLUTION RESPIRATORY (INHALATION) at 08:00

## 2025-04-16 RX ADMIN — GUAIFENESIN 600 MG: 600 TABLET, EXTENDED RELEASE ORAL at 19:55

## 2025-04-16 RX ADMIN — ARFORMOTEROL TARTRATE 15 MCG: 15 SOLUTION RESPIRATORY (INHALATION) at 20:16

## 2025-04-16 RX ADMIN — AMITRIPTYLINE HYDROCHLORIDE 25 MG: 25 TABLET ORAL at 19:55

## 2025-04-16 RX ADMIN — ARFORMOTEROL TARTRATE 15 MCG: 15 SOLUTION RESPIRATORY (INHALATION) at 08:00

## 2025-04-16 RX ADMIN — ALPRAZOLAM 0.5 MG: 0.5 TABLET ORAL at 19:59

## 2025-04-16 RX ADMIN — SODIUM CHLORIDE: 0.9 INJECTION, SOLUTION INTRAVENOUS at 15:44

## 2025-04-16 RX ADMIN — LEVALBUTEROL HYDROCHLORIDE 1.25 MG: 1.25 SOLUTION RESPIRATORY (INHALATION) at 20:16

## 2025-04-16 RX ADMIN — ALPRAZOLAM 0.5 MG: 0.5 TABLET ORAL at 08:06

## 2025-04-16 RX ADMIN — METOPROLOL SUCCINATE 25 MG: 25 TABLET, EXTENDED RELEASE ORAL at 08:07

## 2025-04-16 RX ADMIN — GUAIFENESIN AND DEXTROMETHORPHAN 5 ML: 100; 10 SYRUP ORAL at 19:59

## 2025-04-16 ASSESSMENT — PAIN SCALES - GENERAL
PAINLEVEL_OUTOF10: 0

## 2025-04-16 NOTE — PROGRESS NOTES
PULMONARY AND CRITICAL CARE MEDICINE PROGRESS NOTE      SUBJECTIVE: Patient is sitting comfortably in the bed.  On 1 L nasal cannula saturating well.    REVIEW OF SYSTEMS:   CONSTITUTIONAL SYMPTOMS: The patient denies fever, fatigue, night sweats, weight loss or weight gain.   HEENT: No vision changes. No tinnitus, Denies sinus pain. No hoarseness, or dysphagia.   CARDIOVASCULAR: Denies chest pain, palpitation, syncope.  RESPIRATORY: Denies shortness of breath or cough.   GASTROINTESTINAL: Denies nausea, abdominal pain or change in bowel function.  SKIN: No rashes or itching.   MUSCULOSKELETAL: Denies weakness or bone pain.   NEUROLOGICAL: No headaches or seizures.     MEDICATIONS:      sodium chloride flush  5-40 mL IntraVENous 2 times per day    metoprolol succinate  25 mg Oral Daily    aspirin  81 mg Oral Daily    rosuvastatin  20 mg Oral Nightly    predniSONE  30 mg Oral Daily    levalbuterol  1.25 mg Nebulization 4x Daily RT    guaiFENesin  600 mg Oral BID    lisinopril  2.5 mg Oral Daily    arformoterol tartrate  15 mcg Nebulization BID RT    budesonide  0.5 mg Nebulization BID RT    amitriptyline  25 mg Oral Nightly    escitalopram  20 mg Oral Daily    sodium chloride flush  5-40 mL IntraVENous 2 times per day    enoxaparin  40 mg SubCUTAneous Daily      sodium chloride      sodium chloride       sodium chloride flush, sodium chloride, ALPRAZolam, albuterol, sodium chloride flush, sodium chloride, ondansetron **OR** ondansetron, polyethylene glycol, acetaminophen **OR** acetaminophen, guaiFENesin-dextromethorphan, benzonatate     ALLERGIES:   Allergies as of 04/10/2025    (No Known Allergies)        OBJECTIVE:   height is 1.6 m (5' 3\") and weight is 54.2 kg (119 lb 6.4 oz). Her oral temperature is 98.6 °F (37 °C). Her blood pressure is 145/82 (abnormal) and her pulse is 70. Her respiration is 16 and oxygen saturation is 95%.   No intake/output data recorded.     PHYSICAL EXAM:  CONSTITUTIONAL: She is a 64

## 2025-04-16 NOTE — PROGRESS NOTES
[facility] Daily Progress Note      Admit Date:  4/10/2025    Chief Complaint   Patient presents with    Shortness of Breath     Ems report: pt. On CPAP, on arrival pt. On chronic 3L NC at 80%, improved with CPAP, history of COPD, pt. Reports SOB since Monday.         Subjective:  Ms. Mejia doing well this morning. Continues with SOB. Denies chest pain / pressure.     Objective:   /77   Pulse 72   Temp 98.2 °F (36.8 °C) (Oral)   Resp 16   Ht 1.6 m (5' 3\")   Wt 54.2 kg (119 lb 6.4 oz)   SpO2 95%   BMI 21.15 kg/m²   No intake or output data in the 24 hours ending 04/16/25 1435    TELEMETRY: Sinus     Physical Exam:  General:  Awake, alert, oriented x 3, NAD  Skin:  Warm and dry  Neck:  JVD +  Chest:  rhonchi  Cardiovascular:  RRR S1S2, no S3, no mrmr  Abdomen:  Soft, ND, NT, No HSM  Extremities:  No edema    Medications:    sodium chloride flush  5-40 mL IntraVENous 2 times per day    metoprolol succinate  25 mg Oral Daily    aspirin  81 mg Oral Daily    rosuvastatin  20 mg Oral Nightly    predniSONE  30 mg Oral Daily    levalbuterol  1.25 mg Nebulization 4x Daily RT    guaiFENesin  600 mg Oral BID    lisinopril  2.5 mg Oral Daily    arformoterol tartrate  15 mcg Nebulization BID RT    budesonide  0.5 mg Nebulization BID RT    amitriptyline  25 mg Oral Nightly    escitalopram  20 mg Oral Daily    sodium chloride flush  5-40 mL IntraVENous 2 times per day    enoxaparin  40 mg SubCUTAneous Daily      sodium chloride      sodium chloride       sodium chloride flush, sodium chloride, ALPRAZolam, albuterol, sodium chloride flush, sodium chloride, ondansetron **OR** ondansetron, polyethylene glycol, acetaminophen **OR** acetaminophen, guaiFENesin-dextromethorphan, benzonatate    Lab Data:  CBC:   Recent Labs     04/16/25  0510   WBC 10.1   HGB 13.4   HCT 40.2   MCV 87.8        BMP:   Recent Labs     04/14/25  0450 04/16/25  0510    135*   K 3.5 4.1   CL 95* 95*   CO2 36* 32   PHOS 3.1   --    BUN 14 9   CREATININE 0.4* 0.4*     LIVER PROFILE: No results for input(s): \"AST\", \"ALT\", \"LIPASE\", \"AMYLASE\", \"BILIDIR\", \"BILITOT\", \"ALKPHOS\" in the last 72 hours.    Invalid input(s): \"ALB\"  PT/INR: No results for input(s): \"PROTIME\", \"INR\" in the last 72 hours.  APTT: No results for input(s): \"APTT\" in the last 72 hours.  BNP:  No results for input(s): \"BNP\" in the last 72 hours.  IMAGING:     Echo 2/26/2025    Left Ventricle: Moderately reduced left ventricular systolic function with a visually estimated EF of 30 - 35%. Left ventricle size is normal. Normal wall thickness. There are regional wall motion abnormalities. Indeterminate diastolic function due to poor image quality.    Right Ventricle: Right ventricle size is normal. Normal systolic function.    Tricuspid Valve: Unable to assess RVSP.    Pericardium: Trivial localized pericardial effusion present around the right ventricle. No indication of cardiac tamponade.    IVC/SVC: IVC diameter is less than or equal to 21 mm and decreases greater than 50% during inspiration; therefore the estimated right atrial pressure is normal (~3 mmHg).    Assessment/Plan:  Principal Problem:  Copd exacerbation  Baseline O2 1L     Acute systolic heart failure  new onset, combined systolic and diastolic  acc c nyha III  regional wall motion abnormalities on echo, wife complex tachycardia, Ef 30-35%  cardiac risk factors: heavy smoker, family history of premature cad, hyperlipidemia (), coronary calcifications on chest ct  Plan  continue lisinopril / bb   Not on sglt2 or spironolactone due to blood pressure. No indication for icd as has not been on gdmt for 90 days per acc guidelines   Concern for ischemic etiology of new cardiomyopathy, recommend LHC/RHC for definitive eval   Based on these findings I recommend left heart cath for definitive evaluation of coronary arteries.  Risks, benefits, expectations, and alternative treatments were discussed.  Questions

## 2025-04-16 NOTE — PLAN OF CARE
Problem: Chronic Conditions and Co-morbidities  Goal: Patient's chronic conditions and co-morbidity symptoms are monitored and maintained or improved  4/16/2025 1745 by Thais Gomez RN  Outcome: Progressing  4/16/2025 0613 by Rebecca Cowan RN  Outcome: Progressing     Problem: Discharge Planning  Goal: Discharge to home or other facility with appropriate resources  4/16/2025 1745 by Thais Gomez RN  Outcome: Progressing  4/16/2025 0613 by Rebecac Cowan RN  Outcome: Progressing     Problem: Safety - Adult  Goal: Free from fall injury  4/16/2025 1745 by Thais Gomez RN  Outcome: Progressing  4/16/2025 0613 by Rebecca Cowan RN  Outcome: Progressing     Problem: Skin/Tissue Integrity  Goal: Skin integrity remains intact  Description: 1.  Monitor for areas of redness and/or skin breakdown  2.  Assess vascular access sites hourly  3.  Every 4-6 hours minimum:  Change oxygen saturation probe site  4.  Every 4-6 hours:  If on nasal continuous positive airway pressure, respiratory therapy assess nares and determine need for appliance change or resting period  4/16/2025 1745 by Thais Gomez RN  Outcome: Progressing  4/16/2025 0613 by Rebecca Cowan, RN  Outcome: Progressing

## 2025-04-16 NOTE — PROGRESS NOTES
[facility] - Inpatient Rehabilitation Department   Phone: [phone]    Physical Therapy    [x] Initial Evaluation            [] Daily Treatment Note         [] Discharge Summary      Patient: Neena Mejia   : 1960   MRN: [id number]   Date of Service:  2025  Admitting Diagnosis: COPD with acute exacerbation (HCC)  Current Admission Summary: This is a 63 y/o F w/ a hx of COPD, Tobacco use, on home oxygen therapy 3L, who presented to [facility] with SOB. Patient was found to be hypoxic by EMS on 3L she was placed on CPAP and brought to [facility]. ER work up was negative for COVID-19, Influenza, low procal, nl WBC, VBG 7.34/78/246. CT-PE negative for PE. She was admitted to the medical floor with AE-COPD and acute on chronic hypoxic / hypercapnic respiratory failure, pulmonary medicine was consulted to help with the management on my exam patient was on bipap 14/ fi02 40% feeling better since admission.     Past Medical History:  has a past medical history of COPD (chronic obstructive pulmonary disease) (HCC).  Past Surgical History:  has a past surgical history that includes Tubal ligation;  section; Bronchoscopy (3/5/2025); bronchoscopy (N/A, 3/5/2025); and bronchoscopy (N/A, 3/5/2025).  Discharge Recommendations: Neena Mejia scored a 20/24 on the AM-PAC short mobility form. Current research shows that an AM-PAC score of 18 or greater is typically associated with a discharge to the patient's home setting. Based on the patient's AM-PAC score and their current functional mobility deficits, it is recommended that the patient have 2-3 sessions per week of Physical Therapy at d/c to increase the patient's independence.  At this time, this patient demonstrates the endurance and safety to discharge home with HHPT and a follow up treatment frequency of 2-3x/wk.  Please see assessment section for further patient specific details.  HOME HEALTH CARE: LEVEL 1 STANDARD    - Initial home health  coordinated deep breathing and encouraged to complete frequently during day.  Encouraged to complete AP's, LAQ and marching  regularly.  Pt. Reports knowing and willing.  Encouraged to sit up in chair as much as possible  Functional Outcomes  AM-PAC Inpatient Mobility Raw Score : 20              Cognition  WFL  Orientation:    alert and oriented x 4  Command Following:   WFL    Education  Barriers To Learning: emotional -anxious  Patient Education: patient educated on goals, PT role and benefits, plan of care, precautions, general safety, functional mobility training, proper use of assistive device/equipment, disease specific education, transfer training, discharge recommendations  Learning Assessment:  patient verbalizes and demonstrates understanding    Assessment  Activity Tolerance: Limited by anxiety and SOB - Pt. SpO2 95% at rest and 91-92% with activity while on 2L.   Impairments Requiring Therapeutic Intervention: decreased functional mobility, decreased strength, decreased endurance, decreased balance  Prognosis: good  Clinical Assessment: Pt. Presents with above deficits d/t Rhinovirus causing Acute exacerbation of COPD.  Pt. Will benefit form skilled PT to improve functional mobility and endurance and prevent further respiratory complications.  Anticipate Pt. Returning home and would recommend HHPT.  Safety Interventions: patient left in chair, chair alarm in place, call light within reach, gait belt, patient at risk for falls, and nurse notified    Plan  Frequency: 3-5 x/per week  Current Treatment Recommendations: strengthening, balance training, functional mobility training, transfer training, gait training, stair training, endurance training, neuromuscular re-education, home exercise program, safety education, and equipment evaluation/education    Goals  Patient Goals: Return home   Short Term Goals:  Time Frame: By d/c  Patient will complete bed mobility at modified independent   Patient will

## 2025-04-16 NOTE — PROGRESS NOTES
Patient taken to cath lab on stretcher. Belongings packed and placed on stretcher. RT equipment will be taken to 5C once new bed assigned post cath. Cheryl Mckeon RN BSN

## 2025-04-16 NOTE — CONSULTS
Department of Cardiovascular & Thoracic Surgery  Consult          DIAGNOSIS:  multivessel coronary artery disease    CHIEF COMPLAINT:    Chief Complaint   Patient presents with    Shortness of Breath     Ems report: pt. On CPAP, on arrival pt. On chronic 3L NC at 80%, improved with CPAP, history of COPD, pt. Reports SOB since Monday.        History Obtained From:  patient    HISTORY OF PRESENT ILLNESS:      The patient is a 64 y.o. female with significant past medical history of COPD (baseline O2 of 3L NC at home), CHF, anxiety, depression, HTN and a former smoker who presents to the ED with SOB.  Patient found to be human rhinovirus + with a COPD exacerbation. During hospitalization patient experienced a wide complex tachycardia after receiving a nebulizer treatment and thus cardiology was consulted. Cardiology had a concern for ischemic etiology of new cardiomyopathy, and recommend LHC/RHC for definitive eval. LHC showed multivessel coronary artery disease. CVTS was consulted    Past Medical History:        Diagnosis Date    COPD (chronic obstructive pulmonary disease) (HCC)        Past Surgical History:        Procedure Laterality Date    BRONCHOSCOPY  3/5/2025    BRONCHOSCOPY N/A 3/5/2025    BRONCHOSCOPY THERAPEUTIC ASPIRATION INITIAL performed by Chaz Ochoa MD at [facility] ENDOSCOPY    BRONCHOSCOPY N/A 3/5/2025    BRONCHOSCOPY ALVEOLAR LAVAGE performed by Chaz Ochoa MD at [facility] ENDOSCOPY     SECTION      TUBAL LIGATION         Medications Prior to Admission:   Medications Prior to Admission: ALPRAZolam (XANAX) 0.5 MG tablet, Take 1 tablet by mouth 2 times daily as needed for Sleep. Max Daily Amount: 1 mg  TRELEGY ELLIPTA 100-62.5-25 MCG/ACT AEPB inhaler, Inhale 1 puff into the lungs daily  azithromycin (ZITHROMAX) 250 MG tablet, Take 1 tablet by mouth three times a week (Patient taking differently: Take 1 tablet by mouth three times a week Monday, Wednesday, Friday.)  escitalopram (LEXAPRO)  20 MG tablet, Take 1 tablet by mouth daily  albuterol sulfate HFA (PROVENTIL;VENTOLIN;PROAIR) 108 (90 Base) MCG/ACT inhaler, INHALE 2 PUFFS INTO THE LUNGS EVERY 6 HOURS AS NEEDED FOR WHEEZING  albuterol (PROVENTIL) (2.5 MG/3ML) 0.083% nebulizer solution, Take 3 mLs by nebulization every 6 hours as needed for Wheezing Dx: COPD   ICD-10: J44.9  amitriptyline (ELAVIL) 25 MG tablet, Take 1 tablet by mouth nightly  fluticasone-salmeterol (ADVAIR HFA) 230-21 MCG/ACT inhaler, Inhale 2 puffs into the lungs 2 times daily (Patient not taking: Reported on 4/10/2025)  SPIRIVA RESPIMAT 2.5 MCG/ACT AERS inhaler, INHALE 2 PUFFS INTO THE LUNGS DAILY (Patient not taking: Reported on 4/10/2025)    Allergies:  Patient has no known allergies.    Social History:    TOBACCO:   reports that she quit smoking about 2 years ago. Her smoking use included cigarettes. She started smoking about 45 years ago. She has a 32.3 pack-year smoking history. She has never used smokeless tobacco.  ETOH:   reports no history of alcohol use.  CAFFEINE ABUSE:  No  DRUGS:   reports no history of drug use.  LIFESTYLE: sedentary    MARITAL STATUS:  partner Ed Hernandez  OCCUPATION:  Unemployed     Family History:        Problem Relation Age of Onset    Stroke Mother     Cancer Father     Heart Disease Sister     Heart Disease Brother        REVIEW OF SYSTEMS:      Constitutional:  No night sweats, headaches, weight loss.  Eyes:  No glaucoma, cataracts.   ENMT:  No nosebleeds, deviated septum.   Cardiac:  + arrhythmias, No chest pain.  Vascular:  No claudication, varicosities.  GI:  No PUD, heartburn.  :  No kidney stones, frequent UTIs  Musculoskeletal:  + arthritis, No gout.  Respiratory:  + SOB, + emphysema, No asthma.  Integumentary:  No dermatitis, itching, rash.  Neurological:  No stroke, TIAs, seizures.  Psychiatric:  + depression and anxiety.  Endocrine: No diabetes, thyroid issues.  Hematologic:  No bleeding, easy bruising.  Immunologic:  No known cancer,

## 2025-04-16 NOTE — PROGRESS NOTES
[facility] - Inpatient Rehabilitation Department   Phone: [phone]    Occupational Therapy    [x] Initial Evaluation            [] Daily Treatment Note         [] Discharge Summary      Patient: Neena Mejia   : 1960   MRN: [id number]   Date of Service:  2025    Admitting Diagnosis:  COPD with acute exacerbation (HCC)  Current Admission Summary:  64 y.o. female with a pmh of COPD, chronic respiratory failure on 3 L oxygen, anxiety and depression, former tobacco user quit 3 years ago who presents with COPD exacerbation   Past Medical History:  has a past medical history of COPD (chronic obstructive pulmonary disease) (HCC).  Past Surgical History:  has a past surgical history that includes Tubal ligation;  section; Bronchoscopy (3/5/2025); bronchoscopy (N/A, 3/5/2025); and bronchoscopy (N/A, 3/5/2025).    Discharge Recommendations: Neena Mejia scored a 21/24 on the AM-PAC ADL Inpatient form. Current research shows that an AM-PAC score of 18 or greater is typically associated with a discharge to the patient's home setting. Based on the patient's AM-PAC score, and their current ADL deficits, it is recommended that the patient have 2-3 sessions per week of Occupational Therapy at d/c to increase the patient's independence.  At this time, this patient demonstrates the endurance and safety to discharge home with HHOT (home vs OP services) and a follow up treatment frequency of 2-3x/wk.   Please see assessment section for further patient specific details.    If patient discharges prior to next session this note will serve as a discharge summary.  Please see below for the latest assessment towards goals.       DME Required For Discharge: patient has all required DME for discharge    Precautions/Restrictions: high fall risk, Isolation precautions, up as tolerated (droplet)  Weight Bearing Restrictions: no restrictions  [] Right Upper Extremity  [] Left Upper Extremity [] Right  Lower Extremity  [] Left Lower Extremity     Required Braces/Orthotics: no braces required   [] Right  [] Left  Positional Restrictions:no positional restrictions    Pre-Admission Information   Lives With:  Rolando brito                 Type of Home: hotel  Home Layout: one level  Home Access: level entry  Bathroom Layout: tub/shower unit  Bathroom Equipment: grab bars in shower, shower chair, 3-in-1 commode  Toilet Height: standard height  Home Equipment: rollator - 4 wheeled walker, oxygen -- 3L O2 baseline.   Transfer Assistance: modified independent with use of rollator  Ambulation Assistance:modified independent with use of rollator -- uses rollator within community, intermittently used it to get around home  ADL Assistance: independent with all ADL's  IADL Assistance: independent with homemaking tasks  Active :        [] Yes                 [x] No -- she can drive but they only have 1 vehicle.   Hand Dominance: [] Left                 [x] Right  Current Employment: disabled  Hobbies:   Recent Falls: 1 fall in the past 6 months. Unsure what caused fall but ended up on the floor.      Available Assistance at Discharge: available PRN -- Rolando works during the day. Daughter-in-law comes to visit frequently.      Examination   Vision:   Vision Corrective Device: wears glasses at all times  Hearing:   WFL  Perception:   WFL  Observation:   General Observation:  1.5L O2  Sensation:   denies numbness and tingling  Proprioception:    WFL  Tone:   Normotonic  Coordination Testing:   WFL    ROM:   (B) UE AROM WFL  Strength:   (B) UE strength grossly WFL    Therapist Clinical Decision Making (Complexity): low complexity  Clinical Presentation: stable      Subjective  General: Patient semi-fowlers upon arrival, agreeable to evaluation with maximal encouragement  Pain: 0/10  Pain Interventions: not applicable        Activities of Daily Living  Basic Activities of Daily Living  General Comments: Patient declined ADLs

## 2025-04-16 NOTE — PROGRESS NOTES
[facility]    4/16/2025 10:02 AM        Name: Neena Mejia .              Admitted: 4/10/2025  Primary Care Provider: Haim Malagon DO (Tel: [phone])      Chief complaint: 63 yo female with hx of COPD, chronic respiratory failure on 3 L oxygen presented with shortness of breath, admitted with COPD exacerbation.      Positive for human rhinovirus/enterovirus.    Subjective:  Resting in bed. Reports she feels much better. Breathing greatly improved and approaching baseline. Denies chest pain. NPO for left and right heart cath today. Denies abdominal pain, nausea.     Reviewed interval ancillary notes    Current Medications  sodium chloride flush 0.9 % injection 5-40 mL, 2 times per day  sodium chloride flush 0.9 % injection 5-40 mL, PRN  0.9 % sodium chloride infusion, PRN  metoprolol succinate (TOPROL XL) extended release tablet 25 mg, Daily  aspirin EC tablet 81 mg, Daily  rosuvastatin (CRESTOR) tablet 20 mg, Nightly  predniSONE (DELTASONE) tablet 30 mg, Daily  ALPRAZolam (XANAX) tablet 0.5 mg, Q6H PRN  levalbuterol (XOPENEX) nebulizer solution 1.25 mg, 4x Daily RT  guaiFENesin (MUCINEX) extended release tablet 600 mg, BID  lisinopril (PRINIVIL;ZESTRIL) tablet 2.5 mg, Daily  arformoterol tartrate (BROVANA) nebulizer solution 15 mcg, BID RT  budesonide (PULMICORT) nebulizer suspension 500 mcg, BID RT  albuterol (PROVENTIL) (2.5 MG/3ML) 0.083% nebulizer solution 2.5 mg, Q6H PRN  amitriptyline (ELAVIL) tablet 25 mg, Nightly  escitalopram (LEXAPRO) tablet 20 mg, Daily  sodium chloride flush 0.9 % injection 5-40 mL, 2 times per day  sodium chloride flush 0.9 % injection 5-40 mL, PRN  0.9 % sodium chloride infusion, PRN  ondansetron (ZOFRAN-ODT) disintegrating tablet 4 mg, Q8H PRN   Or  ondansetron (ZOFRAN) injection 4 mg, Q6H PRN  polyethylene glycol (GLYCOLAX) packet 17 g, Daily PRN  enoxaparin

## 2025-04-16 NOTE — PRE SEDATION
Brief Pre-Op Note/Sedation Assessment      Neena Mejia  1960  [id number]  2:35 PM    Planned Procedure: Cardiac Catheterization Procedure  Post Procedure Plan: Return to same level of care  Consent: I have discussed with the patient and/or the patient representative the indication, alternatives, and the possible risks and/or complications of the planned procedure and the anesthesia methods. The patient and/or patient representative appear to understand and agree to proceed.        Chief Complaint:   Chest Pain/Pressure  Dyspnea on Exertion  Dyspnea  Fatigue      Indications for Cath Procedure:  Presentation:  New Onset Angina <= 2 months, Suspected CAD, and Cardiomyopathy  2.  Anginal Classification within 2 weeks:  CCS III - Symptoms with everyday living activities, i.e., moderate limitation  3.  Angina Symptoms Assessment:  Typical Chest Pain  4.  Heart Failure Class within last 2 weeks:  Yes:  Heart Failure Type: Unknown Severity:  Class III - Symptoms of HF on less-than-ordinary exertion  5.  Cardiovascular Instability:  Yes:  Decompensating heart failure and Acute CHF symptoms    Prior Ischemic Workup/Eval:  Pre-Procedural Medications: Yes: ACE/ARB/ARNI, Aspirin, Beta Blockers, and STATIN  2.   Stress Test Completed?  No    Does Patient need surgery?  Cath Valve Surgery:  No    Pre-Procedure Medical History:  Vital Signs:  /77   Pulse 72   Temp 98.2 °F (36.8 °C) (Oral)   Resp 16   Ht 1.6 m (5' 3\")   Wt 54.2 kg (119 lb 6.4 oz)   SpO2 95%   BMI 21.15 kg/m²     Allergies:  No Known Allergies  Medications:    Current Facility-Administered Medications   Medication Dose Route Frequency Provider Last Rate Last Admin    sodium chloride flush 0.9 % injection 5-40 mL  5-40 mL IntraVENous 2 times per day Forest Arizmendi MD   10 mL at 04/16/25 0900    sodium chloride flush 0.9 % injection 5-40 mL  5-40 mL IntraVENous PRN Forest Arizmendi MD        0.9 % sodium chloride infusion   IntraVENous PRN

## 2025-04-16 NOTE — PROGRESS NOTES
Slept well, VSS, Oxygen down to 1L per NC. CHG wipes use last evening. Requests Xanax to be given prior to angiogram.

## 2025-04-17 ENCOUNTER — APPOINTMENT (OUTPATIENT)
Dept: VASCULAR LAB | Age: 65
DRG: 190 | End: 2025-04-17
Payer: MEDICARE

## 2025-04-17 PROBLEM — R06.02 SHORTNESS OF BREATH: Status: ACTIVE | Noted: 2025-04-10

## 2025-04-17 LAB
ANION GAP SERPL CALCULATED.3IONS-SCNC: 6 MMOL/L (ref 3–16)
BUN SERPL-MCNC: 16 MG/DL (ref 7–20)
CALCIUM SERPL-MCNC: 9 MG/DL (ref 8.3–10.6)
CHLORIDE SERPL-SCNC: 95 MMOL/L (ref 99–110)
CO2 SERPL-SCNC: 31 MMOL/L (ref 21–32)
CREAT SERPL-MCNC: 0.4 MG/DL (ref 0.6–1.2)
DEPRECATED RDW RBC AUTO: 13.4 % (ref 12.4–15.4)
ECHO BSA: 1.62 M2
ECHO BSA: 1.62 M2
GFR SERPLBLD CREATININE-BSD FMLA CKD-EPI: >90 ML/MIN/{1.73_M2}
GLUCOSE SERPL-MCNC: 91 MG/DL (ref 70–99)
HCT VFR BLD AUTO: 37.9 % (ref 36–48)
HGB BLD-MCNC: 13.1 G/DL (ref 12–16)
MCH RBC QN AUTO: 30.1 PG (ref 26–34)
MCHC RBC AUTO-ENTMCNC: 34.5 G/DL (ref 31–36)
MCV RBC AUTO: 87 FL (ref 80–100)
PLATELET # BLD AUTO: 285 K/UL (ref 135–450)
PMV BLD AUTO: 7.6 FL (ref 5–10.5)
POTASSIUM SERPL-SCNC: 4.1 MMOL/L (ref 3.5–5.1)
RBC # BLD AUTO: 4.35 M/UL (ref 4–5.2)
SODIUM SERPL-SCNC: 132 MMOL/L (ref 136–145)
VAS LEFT CCA DIST EDV: 8.1 CM/S
VAS LEFT CCA DIST PSV: 38.2 CM/S
VAS LEFT CCA MID EDV: 12.7 CM/S
VAS LEFT CCA MID PSV: 43 CM/S
VAS LEFT CCA PROX EDV: 14.5 CM/S
VAS LEFT CCA PROX PSV: 50.5 CM/S
VAS LEFT ECA EDV: 18.1 CM/S
VAS LEFT ECA PSV: 79.6 CM/S
VAS LEFT GSV AT KNEE DIAM: 0.6 MM
VAS LEFT GSV BK PROX DIAM: 0.8 MM
VAS LEFT GSV JUNC DIAM: 2.2 MM
VAS LEFT GSV THIGH DIST DIAM: 1.3 MM
VAS LEFT GSV THIGH MID DIAM: 2 MM
VAS LEFT GSV THIGH PROX DIAM: 2.3 MM
VAS LEFT ICA DIST EDV: 13.3 CM/S
VAS LEFT ICA DIST PSV: 41 CM/S
VAS LEFT ICA MID EDV: 11.2 CM/S
VAS LEFT ICA MID PSV: 37.1 CM/S
VAS LEFT ICA PROX EDV: 9.8 CM/S
VAS LEFT ICA PROX PSV: 36.4 CM/S
VAS LEFT ICA/CCA PSV: 0.86
VAS LEFT SUBCLAVIAN PROX PSV: 99.6 CM/S
VAS LEFT VERTEBRAL EDV: 12.2 CM/S
VAS LEFT VERTEBRAL PSV: 37.9 CM/S
VAS RIGHT CCA DIST EDV: 7.6 CM/S
VAS RIGHT CCA DIST PSV: 33.2 CM/S
VAS RIGHT CCA MID EDV: 7.6 CM/S
VAS RIGHT CCA MID PSV: 37.1 CM/S
VAS RIGHT CCA PROX EDV: 9 CM/S
VAS RIGHT CCA PROX PSV: 35.2 CM/S
VAS RIGHT ECA EDV: 19.6 CM/S
VAS RIGHT ECA PSV: 103 CM/S
VAS RIGHT GSV BK PROX DIAM: 0.7 MM
VAS RIGHT GSV JUNC DIAM: 4.4 MM
VAS RIGHT GSV THIGH DIST DIAM: 1.7 MM
VAS RIGHT GSV THIGH MID DIAM: 1.7 MM
VAS RIGHT ICA DIST EDV: 19.2 CM/S
VAS RIGHT ICA DIST PSV: 53.3 CM/S
VAS RIGHT ICA MID EDV: 7.5 CM/S
VAS RIGHT ICA MID PSV: 33.3 CM/S
VAS RIGHT ICA PROX EDV: 9.8 CM/S
VAS RIGHT ICA PROX PSV: 31.2 CM/S
VAS RIGHT ICA/CCA PSV: 0.9
VAS RIGHT SUBCLAVIAN PROX PSV: 86.2 CM/S
VAS RIGHT VERTEBRAL EDV: 7.88 CM/S
VAS RIGHT VERTEBRAL PSV: 25.3 CM/S
WBC # BLD AUTO: 11.4 K/UL (ref 4–11)

## 2025-04-17 PROCEDURE — 2500000003 HC RX 250 WO HCPCS

## 2025-04-17 PROCEDURE — 6360000002 HC RX W HCPCS

## 2025-04-17 PROCEDURE — 93880 EXTRACRANIAL BILAT STUDY: CPT

## 2025-04-17 PROCEDURE — 99233 SBSQ HOSP IP/OBS HIGH 50: CPT | Performed by: INTERNAL MEDICINE

## 2025-04-17 PROCEDURE — 6370000000 HC RX 637 (ALT 250 FOR IP)

## 2025-04-17 PROCEDURE — 97110 THERAPEUTIC EXERCISES: CPT

## 2025-04-17 PROCEDURE — 6370000000 HC RX 637 (ALT 250 FOR IP): Performed by: NURSE PRACTITIONER

## 2025-04-17 PROCEDURE — 80048 BASIC METABOLIC PNL TOTAL CA: CPT

## 2025-04-17 PROCEDURE — 2500000003 HC RX 250 WO HCPCS: Performed by: INTERNAL MEDICINE

## 2025-04-17 PROCEDURE — 93880 EXTRACRANIAL BILAT STUDY: CPT | Performed by: INTERNAL MEDICINE

## 2025-04-17 PROCEDURE — 97116 GAIT TRAINING THERAPY: CPT

## 2025-04-17 PROCEDURE — 2700000000 HC OXYGEN THERAPY PER DAY

## 2025-04-17 PROCEDURE — 94640 AIRWAY INHALATION TREATMENT: CPT

## 2025-04-17 PROCEDURE — 6360000002 HC RX W HCPCS: Performed by: STUDENT IN AN ORGANIZED HEALTH CARE EDUCATION/TRAINING PROGRAM

## 2025-04-17 PROCEDURE — 97530 THERAPEUTIC ACTIVITIES: CPT

## 2025-04-17 PROCEDURE — 36415 COLL VENOUS BLD VENIPUNCTURE: CPT

## 2025-04-17 PROCEDURE — 94761 N-INVAS EAR/PLS OXIMETRY MLT: CPT

## 2025-04-17 PROCEDURE — 93970 EXTREMITY STUDY: CPT | Performed by: INTERNAL MEDICINE

## 2025-04-17 PROCEDURE — 1200000000 HC SEMI PRIVATE

## 2025-04-17 PROCEDURE — 99231 SBSQ HOSP IP/OBS SF/LOW 25: CPT

## 2025-04-17 PROCEDURE — 6360000002 HC RX W HCPCS: Performed by: INTERNAL MEDICINE

## 2025-04-17 PROCEDURE — 93970 EXTREMITY STUDY: CPT

## 2025-04-17 PROCEDURE — 6370000000 HC RX 637 (ALT 250 FOR IP): Performed by: INTERNAL MEDICINE

## 2025-04-17 PROCEDURE — 85027 COMPLETE CBC AUTOMATED: CPT

## 2025-04-17 PROCEDURE — 94669 MECHANICAL CHEST WALL OSCILL: CPT

## 2025-04-17 RX ADMIN — ALPRAZOLAM 0.5 MG: 0.5 TABLET ORAL at 09:06

## 2025-04-17 RX ADMIN — SODIUM CHLORIDE, PRESERVATIVE FREE 10 ML: 5 INJECTION INTRAVENOUS at 20:37

## 2025-04-17 RX ADMIN — BUDESONIDE 500 MCG: 0.5 INHALANT RESPIRATORY (INHALATION) at 21:27

## 2025-04-17 RX ADMIN — LEVALBUTEROL HYDROCHLORIDE 1.25 MG: 1.25 SOLUTION RESPIRATORY (INHALATION) at 21:26

## 2025-04-17 RX ADMIN — GUAIFENESIN 600 MG: 600 TABLET, EXTENDED RELEASE ORAL at 09:06

## 2025-04-17 RX ADMIN — LISINOPRIL 2.5 MG: 5 TABLET ORAL at 09:06

## 2025-04-17 RX ADMIN — PREDNISONE 30 MG: 20 TABLET ORAL at 09:06

## 2025-04-17 RX ADMIN — LEVALBUTEROL HYDROCHLORIDE 1.25 MG: 1.25 SOLUTION RESPIRATORY (INHALATION) at 12:25

## 2025-04-17 RX ADMIN — ALPRAZOLAM 0.5 MG: 0.5 TABLET ORAL at 15:43

## 2025-04-17 RX ADMIN — Medication 10 ML: at 09:09

## 2025-04-17 RX ADMIN — METOPROLOL SUCCINATE 25 MG: 25 TABLET, EXTENDED RELEASE ORAL at 09:06

## 2025-04-17 RX ADMIN — SODIUM CHLORIDE, PRESERVATIVE FREE 10 ML: 5 INJECTION INTRAVENOUS at 09:09

## 2025-04-17 RX ADMIN — ARFORMOTEROL TARTRATE 15 MCG: 15 SOLUTION RESPIRATORY (INHALATION) at 12:25

## 2025-04-17 RX ADMIN — BUDESONIDE 500 MCG: 0.5 INHALANT RESPIRATORY (INHALATION) at 12:25

## 2025-04-17 RX ADMIN — ARFORMOTEROL TARTRATE 15 MCG: 15 SOLUTION RESPIRATORY (INHALATION) at 21:39

## 2025-04-17 RX ADMIN — ENOXAPARIN SODIUM 40 MG: 100 INJECTION SUBCUTANEOUS at 09:09

## 2025-04-17 RX ADMIN — LEVALBUTEROL HYDROCHLORIDE 1.25 MG: 1.25 SOLUTION RESPIRATORY (INHALATION) at 16:35

## 2025-04-17 RX ADMIN — GUAIFENESIN 600 MG: 600 TABLET, EXTENDED RELEASE ORAL at 20:37

## 2025-04-17 RX ADMIN — GUAIFENESIN AND DEXTROMETHORPHAN 5 ML: 100; 10 SYRUP ORAL at 20:37

## 2025-04-17 RX ADMIN — ROSUVASTATIN 20 MG: 20 TABLET, FILM COATED ORAL at 20:37

## 2025-04-17 RX ADMIN — AMITRIPTYLINE HYDROCHLORIDE 25 MG: 25 TABLET ORAL at 20:37

## 2025-04-17 RX ADMIN — ASPIRIN 81 MG: 81 TABLET, COATED ORAL at 09:06

## 2025-04-17 RX ADMIN — Medication 10 ML: at 20:38

## 2025-04-17 RX ADMIN — ESCITALOPRAM OXALATE 20 MG: 10 TABLET ORAL at 09:06

## 2025-04-17 RX ADMIN — ALPRAZOLAM 0.5 MG: 0.5 TABLET ORAL at 20:37

## 2025-04-17 ASSESSMENT — PAIN SCALES - GENERAL
PAINLEVEL_OUTOF10: 0

## 2025-04-17 NOTE — PROGRESS NOTES
[facility] - Inpatient Rehabilitation Department   Phone: [phone]    Physical Therapy    [] Initial Evaluation            [x] Daily Treatment Note         [] Discharge Summary      Patient: Neena Mejia   : 1960   MRN: [id number]   Date of Service:  2025  Admitting Diagnosis: COPD with acute exacerbation (HCC)  Current Admission Summary: This is a 65 y/o F w/ a hx of COPD, Tobacco use, on home oxygen therapy 3L, who presented to [facility] with SOB. Patient was found to be hypoxic by EMS on 3L she was placed on CPAP and brought to [facility]. ER work up was negative for COVID-19, Influenza, low procal, nl WBC, VBG 7.34/78/246. CT-PE negative for PE. She was admitted to the medical floor with AE-COPD and acute on chronic hypoxic / hypercapnic respiratory failure, pulmonary medicine was consulted to help with the management on my exam patient was on bipap 14/ fi02 40% feeling better since admission.     Past Medical History:  has a past medical history of COPD (chronic obstructive pulmonary disease) (HCC).  Past Surgical History:  has a past surgical history that includes Tubal ligation;  section; Bronchoscopy (3/5/2025); bronchoscopy (N/A, 3/5/2025); bronchoscopy (N/A, 3/5/2025); and Cardiac procedure (N/A, 2025).  Discharge Recommendations: Neena Mejia scored a 20/24 on the AM-PAC short mobility form. Current research shows that an AM-PAC score of 18 or greater is typically associated with a discharge to the patient's home setting. Based on the patient's AM-PAC score and their current functional mobility deficits, it is recommended that the patient have 2-3 sessions per week of Physical Therapy at d/c to increase the patient's independence.  At this time, this patient demonstrates the endurance and safety to discharge home with HHPT and a follow up treatment frequency of 2-3x/wk.  Please see assessment section for further patient specific details.  HOME HEALTH CARE: LEVEL 1

## 2025-04-17 NOTE — PLAN OF CARE
Problem: Chronic Conditions and Co-morbidities  Goal: Patient's chronic conditions and co-morbidity symptoms are monitored and maintained or improved  4/17/2025 0939 by Francia Marcos RN  Outcome: Progressing     Problem: Discharge Planning  Goal: Discharge to home or other facility with appropriate resources  4/17/2025 0939 by Francia Marcos, RN  Outcome: Progressing     Problem: Safety - Adult  Goal: Free from fall injury  4/17/2025 0939 by Francia Marcos, RN  Outcome: Progressing     Problem: Skin/Tissue Integrity  Goal: Skin integrity remains intact  Description: 1.  Monitor for areas of redness and/or skin breakdown  2.  Assess vascular access sites hourly  3.  Every 4-6 hours minimum:  Change oxygen saturation probe site  4.  Every 4-6 hours:  If on nasal continuous positive airway pressure, respiratory therapy assess nares and determine need for appliance change or resting period  4/17/2025 0939 by Francia Marcos, RN  Outcome: Progressing

## 2025-04-17 NOTE — PROGRESS NOTES
CVTS Thoracic Progress Note:          CC:  Pre-op follow up for multivessel coronary artery disease    Subj: patient sitting up in bed; conversationally dyspneic.     Obj:    Blood pressure 122/88, pulse 89, temperature 97.9 °F (36.6 °C), resp. rate 17, height 1.6 m (5' 3\"), weight 54 kg (119 lb), SpO2 93%, not currently breastfeeding.    Lungs Diminished   Abdomen soft; non-tender    Diagnostics:   CBC:   Lab Results   Component Value Date/Time    WBC 11.4 04/17/2025 04:26 AM    RBC 4.35 04/17/2025 04:26 AM    HGB 13.1 04/17/2025 04:26 AM    HCT 37.9 04/17/2025 04:26 AM    MCV 87.0 04/17/2025 04:26 AM    MCH 30.1 04/17/2025 04:26 AM    MCHC 34.5 04/17/2025 04:26 AM    RDW 13.4 04/17/2025 04:26 AM     04/17/2025 04:26 AM    MPV 7.6 04/17/2025 04:26 AM     BMP:    Lab Results   Component Value Date/Time     04/17/2025 04:26 AM    K 4.1 04/17/2025 04:26 AM    K 4.7 04/11/2025 04:47 AM    CL 95 04/17/2025 04:26 AM    CO2 31 04/17/2025 04:26 AM    BUN 16 04/17/2025 04:26 AM    CREATININE 0.4 04/17/2025 04:26 AM    CALCIUM 9.0 04/17/2025 04:26 AM    LABGLOM >90 04/17/2025 04:26 AM    GLUCOSE 91 04/17/2025 04:26 AM       Assess/Plan:     After reviewing recent testing and given the high risk nature of coronary artery bypass grafting it was decided that patients COPD precludes bypass surgery at this time.     Thank you for allowing us to participate in the care of this patient.     ____________________________________________________________    Inocencia Flores, JOSE - CNP  4/17/2025  9:36 AM

## 2025-04-17 NOTE — PROGRESS NOTES
Speech Language Pathology  Attempt/Hold    Neena Mejia  1960    SLP attempted to see pt this date for dysphagia intervention. Chart reviewed, spoke with RN who cleared SLP entry. Pt sitting upright in chair, awake and alert upon SLP entry. Pt declined participation in therapy at this time. Will hold and attempt again as pt is appropriate and agreeable.    Alma Velazquez MA CCC-SLP  Speech-Language Pathologist  SP. [id number]

## 2025-04-17 NOTE — PROGRESS NOTES
PULMONARY AND CRITICAL CARE MEDICINE PROGRESS NOTE      SUBJECTIVE: Patient denies any shortness of breath.  Underwent left heart catheterization yesterday that showed multivessel coronary artery disease involving left main, left circumflex and LAD    REVIEW OF SYSTEMS:   CONSTITUTIONAL SYMPTOMS: The patient denies fever, fatigue, night sweats, weight loss or weight gain.   HEENT: No vision changes. No tinnitus, Denies sinus pain. No hoarseness, or dysphagia.   CARDIOVASCULAR: Denies chest pain, palpitation, syncope.  RESPIRATORY: Denies shortness of breath or cough.   GASTROINTESTINAL: Denies nausea, abdominal pain or change in bowel function.  SKIN: No rashes or itching.   MUSCULOSKELETAL: Denies weakness or bone pain.   NEUROLOGICAL: No headaches or seizures.     MEDICATIONS:      sodium chloride flush  5-40 mL IntraVENous 2 times per day    metoprolol succinate  25 mg Oral Daily    aspirin  81 mg Oral Daily    rosuvastatin  20 mg Oral Nightly    predniSONE  30 mg Oral Daily    levalbuterol  1.25 mg Nebulization 4x Daily RT    guaiFENesin  600 mg Oral BID    lisinopril  2.5 mg Oral Daily    arformoterol tartrate  15 mcg Nebulization BID RT    budesonide  0.5 mg Nebulization BID RT    amitriptyline  25 mg Oral Nightly    escitalopram  20 mg Oral Daily    sodium chloride flush  5-40 mL IntraVENous 2 times per day    enoxaparin  40 mg SubCUTAneous Daily      sodium chloride      sodium chloride       sodium chloride flush, sodium chloride, acetaminophen, oxyCODONE-acetaminophen **OR** oxyCODONE-acetaminophen, ALPRAZolam, albuterol, sodium chloride flush, sodium chloride, ondansetron **OR** ondansetron, polyethylene glycol, [DISCONTINUED] acetaminophen **OR** acetaminophen, guaiFENesin-dextromethorphan, benzonatate     ALLERGIES:   Allergies as of 04/10/2025    (No Known Allergies)        OBJECTIVE:   height is 1.6 m (5' 3\") and weight is 54 kg (119 lb). Her temporal temperature is 97.7 °F (36.5 °C). Her blood        IMAGING:   I reviewed the CT chest from 4/10/2025 and 90 potation's as follows.  Severe emphysema.  No significant mucous plugging        IMPRESSION:   Acute exacerbation of severe COPD, FEV1 22%  Acute chronic hypoxic and hypercapnic respiratory failure  Rhinovirus/enterovirus infection  Anxiety/depression  Acute systolic heart failure with EF 30 to 35%        RECOMMENDATION:   Patient again presents with acute exacerbation of COPD.  She has had back-to-back hospitalizations this year for the same.  Now has human rhinovirus infection.  Has required bronchoscopy recently for mucous plugging when she was noted to have mucous plugging of bronchus intermedius.  Current CT does not show any evidence of mucous plugging.    Clinical status has improved today.    Continue aggressive pulmonary toilet with chest vest twice daily MetaNebs and Acapella every 4 hours.  No need for bronchoscopy.  Patient also has anxiety at baseline which makes shortness of breath worse.  Continue Xopenex around-the-clock.  Continue Pulmicort and Browner nebs twice daily  Continue Mucinex twice daily  Continue prednisone taper.     Patient suffers from acute on chronic hypoxic and hypercarbic respiratory failure Consequent to COPD. Alternative modes of ventilation, such as Bipap with a rate, have been tried and failed as evidenced by continued and uncompensated hypercarbia. Patient requires NIV, with volume targeted modes that Bipap cannot provide in order to prevent elevated C02 levels, altered mental status and possible death. Patient is at increased risk of continued exacerbations and hospital readmissions without NIV therapy.      New onset acute systolic heart failure, EF 30 to 35%.  Regional wall motions abnormalities on echo with wide-complex tachycardia.  Left heart cath showed multivessel CAD with involvement of left main, left circumflex and LAD.  Patient is not a candidate for CABG per cardiothoracic surgery.  Awaiting

## 2025-04-17 NOTE — CARE COORDINATION
Discharge Planning;  CM received a call from Nereida ( [phone]) with [facility]  stating that patient has been approved for NIV (Non-Invasive Ventilator) for home.   She would like to be informed when patient is near discharge so that someone from the agency can come and do the teaching and the set up.  Incase  the patient is discharged over the weekend , Nereida  needs to be informed so that someone can be sent to the patient's home on Monday to do the set up .

## 2025-04-17 NOTE — PROGRESS NOTES
[facility] Daily Progress Note      Admit Date:  4/10/2025    Chief Complaint   Patient presents with    Shortness of Breath     Ems report: pt. On CPAP, on arrival pt. On chronic 3L NC at 80%, improved with CPAP, history of COPD, pt. Reports SOB since Monday.         Subjective:  Ms. Mejia resting comfortably in bed. Remains on 3L O2. Denies chest pain / pressure. R radial site CDI.     Objective:   /78   Pulse 72   Temp 97.7 °F (36.5 °C) (Temporal)   Resp 17   Ht 1.6 m (5' 3\")   Wt 54 kg (119 lb)   SpO2 92%   BMI 21.08 kg/m²     Intake/Output Summary (Last 24 hours) at 4/17/2025 1447  Last data filed at 4/17/2025 0909  Gross per 24 hour   Intake 250 ml   Output 170 ml   Net 80 ml       TELEMETRY: Sinus     Physical Exam:  General:  Awake, alert, oriented x 3, NAD  Skin:  Warm and dry  Neck:  JVD +  Chest:  rhonchi  Cardiovascular:  RRR S1S2, no S3, no mrmr  Abdomen:  Soft, ND, NT, No HSM  Extremities:  No edema    Medications:    sodium chloride flush  5-40 mL IntraVENous 2 times per day    metoprolol succinate  25 mg Oral Daily    aspirin  81 mg Oral Daily    rosuvastatin  20 mg Oral Nightly    predniSONE  30 mg Oral Daily    levalbuterol  1.25 mg Nebulization 4x Daily RT    guaiFENesin  600 mg Oral BID    lisinopril  2.5 mg Oral Daily    arformoterol tartrate  15 mcg Nebulization BID RT    budesonide  0.5 mg Nebulization BID RT    amitriptyline  25 mg Oral Nightly    escitalopram  20 mg Oral Daily    sodium chloride flush  5-40 mL IntraVENous 2 times per day    enoxaparin  40 mg SubCUTAneous Daily      sodium chloride      sodium chloride       sodium chloride flush, sodium chloride, acetaminophen, oxyCODONE-acetaminophen **OR** oxyCODONE-acetaminophen, ALPRAZolam, albuterol, sodium chloride flush, sodium chloride, ondansetron **OR** ondansetron, polyethylene glycol, [DISCONTINUED] acetaminophen **OR** acetaminophen, guaiFENesin-dextromethorphan, benzonatate    Lab Data:  CBC:   Recent  Labs     04/16/25  0510 04/17/25  0426   WBC 10.1 11.4*   HGB 13.4 13.1   HCT 40.2 37.9   MCV 87.8 87.0    285     BMP:   Recent Labs     04/16/25  0510 04/17/25  0426   * 132*   K 4.1 4.1   CL 95* 95*   CO2 32 31   BUN 9 16   CREATININE 0.4* 0.4*     LIVER PROFILE: No results for input(s): \"AST\", \"ALT\", \"LIPASE\", \"AMYLASE\", \"BILIDIR\", \"BILITOT\", \"ALKPHOS\" in the last 72 hours.    Invalid input(s): \"ALB\"  PT/INR: No results for input(s): \"PROTIME\", \"INR\" in the last 72 hours.  APTT: No results for input(s): \"APTT\" in the last 72 hours.  BNP:  No results for input(s): \"BNP\" in the last 72 hours.  IMAGING:     Echo 2/26/2025    Left Ventricle: Moderately reduced left ventricular systolic function with a visually estimated EF of 30 - 35%. Left ventricle size is normal. Normal wall thickness. There are regional wall motion abnormalities. Indeterminate diastolic function due to poor image quality.    Right Ventricle: Right ventricle size is normal. Normal systolic function.    Tricuspid Valve: Unable to assess RVSP.    Pericardium: Trivial localized pericardial effusion present around the right ventricle. No indication of cardiac tamponade.    IVC/SVC: IVC diameter is less than or equal to 21 mm and decreases greater than 50% during inspiration; therefore the estimated right atrial pressure is normal (~3 mmHg).    [facility] 4/16/2025  IMPRESSION/SUMMARY  ~Coronary Angiography w/ severe LM/LCX/LAD disease. Heavily calcified    Assessment/Plan:  Principal Problem:  Copd exacerbation  Baseline O2 1L  Currently on 2L      Acute systolic heart failure  new onset, combined systolic and diastolic  acc c nyha III  Ischemia cause due to LM disease  regional wall motion abnormalities on echo, wide complex tachycardia, Ef 30-35%  continue lisinopril / bb   Not on sglt2 or spironolactone due to blood pressure.  [facility] 4/16/2025 with severe LM/LCX/LAD disease. Heavily calcified   CVTS seeing for CABG consideration vs high risk

## 2025-04-17 NOTE — PLAN OF CARE
Problem: Chronic Conditions and Co-morbidities  Goal: Patient's chronic conditions and co-morbidity symptoms are monitored and maintained or improved  4/17/2025 1959 by Luba Murillo RN  Outcome: Progressing  Flowsheets (Taken 4/17/2025 1954)  Care Plan - Patient's Chronic Conditions and Co-Morbidity Symptoms are Monitored and Maintained or Improved:   Monitor and assess patient's chronic conditions and comorbid symptoms for stability, deterioration, or improvement   Collaborate with multidisciplinary team to address chronic and comorbid conditions and prevent exacerbation or deterioration  4/17/2025 0939 by Francia Marcos RN  Outcome: Progressing     Problem: Discharge Planning  Goal: Discharge to home or other facility with appropriate resources  4/17/2025 1959 by Luba Murillo RN  Outcome: Progressing  Flowsheets (Taken 4/17/2025 1954)  Discharge to home or other facility with appropriate resources:   Identify barriers to discharge with patient and caregiver   Arrange for needed discharge resources and transportation as appropriate  4/17/2025 0939 by Francia Marcos RN  Outcome: Progressing     Problem: Safety - Adult  Goal: Free from fall injury  4/17/2025 1959 by Luba Murillo RN  Outcome: Progressing  4/17/2025 0939 by Francia Marcos RN  Outcome: Progressing     Problem: Skin/Tissue Integrity  Goal: Skin integrity remains intact  Description: 1.  Monitor for areas of redness and/or skin breakdown  2.  Assess vascular access sites hourly  3.  Every 4-6 hours minimum:  Change oxygen saturation probe site  4.  Every 4-6 hours:  If on nasal continuous positive airway pressure, respiratory therapy assess nares and determine need for appliance change or resting period  4/17/2025 1959 by Luba Murillo, RN  Outcome: Progressing  Flowsheets (Taken 4/17/2025 1954)  Skin Integrity Remains Intact:   Monitor for areas of redness and/or skin breakdown   Assess vascular access sites hourly  4/17/2025

## 2025-04-17 NOTE — PLAN OF CARE
Problem: Chronic Conditions and Co-morbidities  Goal: Patient's chronic conditions and co-morbidity symptoms are monitored and maintained or improved  4/16/2025 2108 by Luba Murillo RN  Outcome: Progressing  Flowsheets (Taken 4/16/2025 2000)  Care Plan - Patient's Chronic Conditions and Co-Morbidity Symptoms are Monitored and Maintained or Improved:   Monitor and assess patient's chronic conditions and comorbid symptoms for stability, deterioration, or improvement   Collaborate with multidisciplinary team to address chronic and comorbid conditions and prevent exacerbation or deterioration  4/16/2025 1745 by Thais Gomez RN  Outcome: Progressing     Problem: Discharge Planning  Goal: Discharge to home or other facility with appropriate resources  4/16/2025 2108 by Luba Murillo RN  Outcome: Progressing  Flowsheets (Taken 4/16/2025 2000)  Discharge to home or other facility with appropriate resources:   Identify barriers to discharge with patient and caregiver   Arrange for needed discharge resources and transportation as appropriate  4/16/2025 1745 by Thais Gomez RN  Outcome: Progressing     Problem: Safety - Adult  Goal: Free from fall injury  4/16/2025 2108 by Luba Murillo RN  Outcome: Progressing  4/16/2025 1745 by Thais Gomez RN  Outcome: Progressing     Problem: Skin/Tissue Integrity  Goal: Skin integrity remains intact  Description: 1.  Monitor for areas of redness and/or skin breakdown  2.  Assess vascular access sites hourly  3.  Every 4-6 hours minimum:  Change oxygen saturation probe site  4.  Every 4-6 hours:  If on nasal continuous positive airway pressure, respiratory therapy assess nares and determine need for appliance change or resting period  4/16/2025 2108 by Luba Murillo, RN  Outcome: Progressing  Flowsheets (Taken 4/16/2025 2000)  Skin Integrity Remains Intact:   Monitor for areas of redness and/or skin breakdown   Assess vascular access sites  hourly  4/16/2025 1745 by Thais Gomez, RN  Outcome: Progressing     Problem: Pain  Goal: Verbalizes/displays adequate comfort level or baseline comfort level  Outcome: Progressing

## 2025-04-17 NOTE — PROGRESS NOTES
Occupational Therapy  Neena Mejia    Patient sitting in chair with friend visiting.   Explained purpose of OT but patient declined stating wanting to visit with friend.   Patient did participate with PT this AM.   Nurse aware.     Thank you,  Sylvia Arias, OTR/L [id number]

## 2025-04-17 NOTE — PROGRESS NOTES
Nutrition Note    RECOMMENDATIONS  PO Diet: Continue current diet  ONS: Ordered Magic Cup and Gelatein each once daily    ASSESSMENT   Pt triggered for LOS assessment. Off unit upon visiting, information obtained from chart review. Reported no issues with appetite/po intake or unintentional wt loss upon admission. On regular, cardiac restricted diet with documented meal intakes of less than 50% beginning of admission, % x 1 yesterday. Wt hx in EMR indicates 16 lb (13%) wt loss from 6/17/24 to 2/26/25, no further loss since 2/26. Per past RD notes, drinks Boost at home and does not like Ensure but was willing to try Magic Cup or Gelatein per past RD note. Will order ONS to offer additional nutrition and monitor for adequate po intake.     Malnutrition Status: Insufficient data    NUTRITION DIAGNOSIS   Inadequate oral intake related to inadequate protein-energy intake as evidenced by variable po intake    Goals: PO intake 50% or greater, prior to discharge     NUTRITION RELATED FINDINGS  Objective: Na+ 132. LBM 4/17.  Wounds: None    CURRENT NUTRITION THERAPIES  ADULT DIET; Regular; Low Fat/Low Chol/High Fiber/2 gm Na       PO Intake: Unable to assess   PO Supplement Intake:None Ordered    ANTHROPOMETRICS  Current Height: 160 cm (5' 3\")  Current Weight - Scale: 54 kg (119 lb)      COMPARATIVE STANDARDS  Total Energy Requirements (kcals/day): 9400-6954     Protein (g):  65-81       Fluid (mL/day):  1620    EDUCATION  Education/Counseling not indicated     The patient will be monitored per nutrition standards of care. Consult dietitian if additional nutrition interventions are needed prior to RD reassessment.     Xena Mejia MS, RD, LD    Contact: [phone]

## 2025-04-17 NOTE — PROGRESS NOTES
[facility]    4/17/2025 11:05 AM        Name: Neena Mejia .              Admitted: 4/10/2025  Primary Care Provider: Haim Malagon DO (Tel: [phone])      Chief complaint: 63 yo female with hx of COPD, chronic respiratory failure on 3 L oxygen presented with shortness of breath, admitted with COPD exacerbation.      Positive for human rhinovirus/enterovirus.    Subjective:  Resting in bed. S/p LHC yesterday revealing severe MVD, CTS evaluated. Currently denies chest pain or shortness of breath at rest, will get winded ambulating to bathroom. Denies abdominal pain, nausea.     Reviewed interval ancillary notes    Current Medications  sodium chloride flush 0.9 % injection 5-40 mL, 2 times per day  sodium chloride flush 0.9 % injection 5-40 mL, PRN  0.9 % sodium chloride infusion, PRN  acetaminophen (TYLENOL) tablet 650 mg, Q4H PRN  oxyCODONE-acetaminophen (PERCOCET) 5-325 MG per tablet 1 tablet, Q4H PRN   Or  oxyCODONE-acetaminophen (PERCOCET) 5-325 MG per tablet 2 tablet, Q4H PRN  metoprolol succinate (TOPROL XL) extended release tablet 25 mg, Daily  aspirin EC tablet 81 mg, Daily  rosuvastatin (CRESTOR) tablet 20 mg, Nightly  predniSONE (DELTASONE) tablet 30 mg, Daily  ALPRAZolam (XANAX) tablet 0.5 mg, Q6H PRN  levalbuterol (XOPENEX) nebulizer solution 1.25 mg, 4x Daily RT  guaiFENesin (MUCINEX) extended release tablet 600 mg, BID  lisinopril (PRINIVIL;ZESTRIL) tablet 2.5 mg, Daily  arformoterol tartrate (BROVANA) nebulizer solution 15 mcg, BID RT  budesonide (PULMICORT) nebulizer suspension 500 mcg, BID RT  albuterol (PROVENTIL) (2.5 MG/3ML) 0.083% nebulizer solution 2.5 mg, Q6H PRN  amitriptyline (ELAVIL) tablet 25 mg, Nightly  escitalopram (LEXAPRO) tablet 20 mg, Daily  sodium chloride flush 0.9 % injection 5-40 mL, 2 times per day  sodium chloride flush 0.9 % injection 5-40 mL, PRN  0.9 %  acute cardiopulmonary process.   2. Emphysema and COPD.           [facility] 4/16/2025:  IMPRESSION/SUMMARY  ~Coronary Angiography w/ severe LM/LCX/LAD disease. Heavily calcified     RECOMMENDATION:  ~Aggressive medical treatment and risk factor modification  ~Recommend CABG. Patient has advanced COPD is very frail. Likely has prohibitive risk for CABG. PCI would be high risk and require atherectomy/impella. It may also be prohibitive due to severity of LM disease and high risk of mortality.       Echo 2/26/25:    Left Ventricle: Moderately reduced left ventricular systolic function with a visually estimated EF of 30 - 35%. Left ventricle size is normal. Normal wall thickness. There are regional wall motion abnormalities. Indeterminate diastolic function due to poor image quality.    Right Ventricle: Right ventricle size is normal. Normal systolic function.    Tricuspid Valve: Unable to assess RVSP.    Pericardium: Trivial localized pericardial effusion present around the right ventricle. No indication of cardiac tamponade.    IVC/SVC: IVC diameter is less than or equal to 21 mm and decreases greater than 50% during inspiration; therefore the estimated right atrial pressure is normal (~3 mmHg).    Problem List  Principal Problem:    COPD with acute exacerbation (HCC)  Active Problems:    SOB (shortness of breath)    Acute respiratory failure with hypoxia and hypercapnia    Wide-complex tachycardia    Systolic CHF, chronic (HCC)    Rhinovirus infection    Cardiomyopathy  Resolved Problems:    * No resolved hospital problems. *       Assessment & Plan:     Acute on chronic hypoxic and hypercapnic respiratory failure,  Advanced COPD with acute exacerbation,  Human Rhinovirus  - Severe COPD with FEV1 22%  - Presented with worsening shortness of breath and increased O2 requirements, uses 3 liters at baseline  - Completed course of Zithromax  - IV solumedrol has transitioned to prednisone taper  - Improved  - O2 needs stable,

## 2025-04-17 NOTE — RT PROTOCOL NOTE
RT Inhaler-Nebulizer Bronchodilator Protocol Note    There is a bronchodilator order in the chart from a provider indicating to follow the RT Bronchodilator Protocol and there is an “Initiate RT Inhaler-Nebulizer Bronchodilator Protocol” order as well (see protocol at bottom of note).    CXR Findings:  No results found.    The findings from the last RT Protocol Assessment were as follows:   History Pulmonary Disease: Chronic pulmonary disease  Respiratory Pattern: Dyspnea on exertion or RR 21-25 bpm  Breath Sounds: Inspiratory and expiratory or bilateral wheezing and/or rhonchi  Cough: Strong, productive  Indication for Bronchodilator Therapy: On home bronchodilators  Bronchodilator Assessment Score: 11    Aerosolized bronchodilator medication orders have been revised according to the RT Inhaler-Nebulizer Bronchodilator Protocol below.    Respiratory Therapist to perform RT Therapy Protocol Assessment initially then follow the protocol.  Repeat RT Therapy Protocol Assessment PRN for score 0-3 or on second treatment, BID, and PRN for scores above 3.    No Indications - adjust the frequency to every 6 hours PRN wheezing or bronchospasm, if no treatments needed after 48 hours then discontinue using Per Protocol order mode.     If indication present, adjust the RT bronchodilator orders based on the Bronchodilator Assessment Score as indicated below.  Use Inhaler orders unless patient has one or more of the following: on home nebulizer, not able to hold breath for 10 seconds, is not alert and oriented, cannot activate and use MDI correctly, or respiratory rate 25 breaths per minute or more, then use the equivalent nebulizer order(s) with same Frequency and PRN reasons based on the score.  If a patient is on this medication at home then do not decrease Frequency below that used at home.    0-3 - enter or revise RT bronchodilator order(s) to equivalent RT Bronchodilator order with Frequency of every 4 hours PRN for 
RT Inhaler-Nebulizer Bronchodilator Protocol Note    There is a bronchodilator order in the chart from a provider indicating to follow the RT Bronchodilator Protocol and there is an “Initiate RT Inhaler-Nebulizer Bronchodilator Protocol” order as well (see protocol at bottom of note).    CXR Findings:  No results found.    The findings from the last RT Protocol Assessment were as follows:   History Pulmonary Disease: Chronic pulmonary disease  Respiratory Pattern: Severe use of accessory muscle or RR>30 bpm  Breath Sounds: Slightly diminished and/or crackles  Cough: Strong, productive  Indication for Bronchodilator Therapy: On home bronchodilators  Bronchodilator Assessment Score: 13    Aerosolized bronchodilator medication orders have been revised according to the RT Inhaler-Nebulizer Bronchodilator Protocol below.    Respiratory Therapist to perform RT Therapy Protocol Assessment initially then follow the protocol.  Repeat RT Therapy Protocol Assessment PRN for score 0-3 or on second treatment, BID, and PRN for scores above 3.    No Indications - adjust the frequency to every 6 hours PRN wheezing or bronchospasm, if no treatments needed after 48 hours then discontinue using Per Protocol order mode.     If indication present, adjust the RT bronchodilator orders based on the Bronchodilator Assessment Score as indicated below.  Use Inhaler orders unless patient has one or more of the following: on home nebulizer, not able to hold breath for 10 seconds, is not alert and oriented, cannot activate and use MDI correctly, or respiratory rate 25 breaths per minute or more, then use the equivalent nebulizer order(s) with same Frequency and PRN reasons based on the score.  If a patient is on this medication at home then do not decrease Frequency below that used at home.    0-3 - enter or revise RT bronchodilator order(s) to equivalent RT Bronchodilator order with Frequency of every 4 hours PRN for wheezing or increased work 
with one order with TID Frequency and one order with Frequency of every 4 hours PRN wheezing or increased work of breathing using Per Protocol order mode.       11-13 - enter or revise RT Bronchodilator order(s) to one equivalent RT bronchodilator order with QID Frequency and an Albuterol order with Frequency of every 4 hours PRN wheezing or increased work of breathing using Per Protocol order mode.      Greater than 13 - enter or revise RT Bronchodilator order(s) to one equivalent RT bronchodilator order with every 4 hours Frequency and an Albuterol order with Frequency of every 2 hours PRN wheezing or increased work of breathing using Per Protocol order mode.     RT to enter RT Home Evaluation for COPD & MDI Assessment order using Per Protocol order mode.    Electronically signed by Jacquelin Johnson RCP on 4/13/2025 at 6:35 AM

## 2025-04-17 NOTE — PROGRESS NOTES
04/16/25 221   RT Protocol   History Pulmonary Disease 2   Respiratory pattern 2   Breath sounds 6   Cough 1   Indications for Bronchodilator Therapy On home bronchodilators   Bronchodilator Assessment Score 11

## 2025-04-18 VITALS
BODY MASS INDEX: 21.03 KG/M2 | WEIGHT: 118.7 LBS | HEIGHT: 63 IN | RESPIRATION RATE: 18 BRPM | OXYGEN SATURATION: 91 % | TEMPERATURE: 96.8 F | SYSTOLIC BLOOD PRESSURE: 122 MMHG | HEART RATE: 80 BPM | DIASTOLIC BLOOD PRESSURE: 82 MMHG

## 2025-04-18 LAB
ANION GAP SERPL CALCULATED.3IONS-SCNC: 8 MMOL/L (ref 3–16)
BASOPHILS # BLD: 0 K/UL (ref 0–0.2)
BASOPHILS NFR BLD: 0.3 %
BUN SERPL-MCNC: 13 MG/DL (ref 7–20)
CALCIUM SERPL-MCNC: 9.2 MG/DL (ref 8.3–10.6)
CHLORIDE SERPL-SCNC: 94 MMOL/L (ref 99–110)
CO2 SERPL-SCNC: 31 MMOL/L (ref 21–32)
CREAT SERPL-MCNC: 0.5 MG/DL (ref 0.6–1.2)
DEPRECATED RDW RBC AUTO: 13.7 % (ref 12.4–15.4)
EOSINOPHIL # BLD: 0.2 K/UL (ref 0–0.6)
EOSINOPHIL NFR BLD: 1.9 %
GFR SERPLBLD CREATININE-BSD FMLA CKD-EPI: >90 ML/MIN/{1.73_M2}
GLUCOSE SERPL-MCNC: 94 MG/DL (ref 70–99)
HCT VFR BLD AUTO: 40.5 % (ref 36–48)
HGB BLD-MCNC: 13.8 G/DL (ref 12–16)
LYMPHOCYTES # BLD: 2.9 K/UL (ref 1–5.1)
LYMPHOCYTES NFR BLD: 22.7 %
MCH RBC QN AUTO: 29.7 PG (ref 26–34)
MCHC RBC AUTO-ENTMCNC: 34 G/DL (ref 31–36)
MCV RBC AUTO: 87.2 FL (ref 80–100)
MONOCYTES # BLD: 1.2 K/UL (ref 0–1.3)
MONOCYTES NFR BLD: 9.1 %
NEUTROPHILS # BLD: 8.6 K/UL (ref 1.7–7.7)
NEUTROPHILS NFR BLD: 66 %
PLATELET # BLD AUTO: 283 K/UL (ref 135–450)
PMV BLD AUTO: 7.3 FL (ref 5–10.5)
POTASSIUM SERPL-SCNC: 4.6 MMOL/L (ref 3.5–5.1)
RBC # BLD AUTO: 4.65 M/UL (ref 4–5.2)
SODIUM SERPL-SCNC: 133 MMOL/L (ref 136–145)
WBC # BLD AUTO: 13 K/UL (ref 4–11)

## 2025-04-18 PROCEDURE — 80048 BASIC METABOLIC PNL TOTAL CA: CPT

## 2025-04-18 PROCEDURE — 99233 SBSQ HOSP IP/OBS HIGH 50: CPT | Performed by: INTERNAL MEDICINE

## 2025-04-18 PROCEDURE — 94640 AIRWAY INHALATION TREATMENT: CPT

## 2025-04-18 PROCEDURE — 36415 COLL VENOUS BLD VENIPUNCTURE: CPT

## 2025-04-18 PROCEDURE — 2700000000 HC OXYGEN THERAPY PER DAY

## 2025-04-18 PROCEDURE — 6370000000 HC RX 637 (ALT 250 FOR IP): Performed by: NURSE PRACTITIONER

## 2025-04-18 PROCEDURE — 6370000000 HC RX 637 (ALT 250 FOR IP): Performed by: INTERNAL MEDICINE

## 2025-04-18 PROCEDURE — 6360000002 HC RX W HCPCS: Performed by: STUDENT IN AN ORGANIZED HEALTH CARE EDUCATION/TRAINING PROGRAM

## 2025-04-18 PROCEDURE — 2500000003 HC RX 250 WO HCPCS: Performed by: INTERNAL MEDICINE

## 2025-04-18 PROCEDURE — 2500000003 HC RX 250 WO HCPCS

## 2025-04-18 PROCEDURE — 94761 N-INVAS EAR/PLS OXIMETRY MLT: CPT

## 2025-04-18 PROCEDURE — 6360000002 HC RX W HCPCS: Performed by: INTERNAL MEDICINE

## 2025-04-18 PROCEDURE — 94669 MECHANICAL CHEST WALL OSCILL: CPT

## 2025-04-18 PROCEDURE — 6370000000 HC RX 637 (ALT 250 FOR IP)

## 2025-04-18 PROCEDURE — 85025 COMPLETE CBC W/AUTO DIFF WBC: CPT

## 2025-04-18 RX ORDER — ALPRAZOLAM 0.5 MG
0.5 TABLET ORAL 3 TIMES DAILY PRN
Qty: 9 TABLET | Refills: 0 | Status: SHIPPED | OUTPATIENT
Start: 2025-04-18 | End: 2025-04-21

## 2025-04-18 RX ORDER — ASPIRIN 81 MG/1
81 TABLET ORAL DAILY
Qty: 30 TABLET | Refills: 3 | Status: SHIPPED | OUTPATIENT
Start: 2025-04-19

## 2025-04-18 RX ORDER — GUAIFENESIN 600 MG/1
600 TABLET, EXTENDED RELEASE ORAL 2 TIMES DAILY
Qty: 14 TABLET | Refills: 0 | Status: SHIPPED | OUTPATIENT
Start: 2025-04-18

## 2025-04-18 RX ORDER — METOPROLOL SUCCINATE 25 MG/1
25 TABLET, EXTENDED RELEASE ORAL DAILY
Qty: 30 TABLET | Refills: 2 | Status: SHIPPED | OUTPATIENT
Start: 2025-04-19

## 2025-04-18 RX ORDER — PREDNISONE 20 MG/1
20 TABLET ORAL DAILY
Status: DISCONTINUED | OUTPATIENT
Start: 2025-04-19 | End: 2025-04-18 | Stop reason: HOSPADM

## 2025-04-18 RX ORDER — CLOPIDOGREL 300 MG/1
300 TABLET, FILM COATED ORAL ONCE
Status: COMPLETED | OUTPATIENT
Start: 2025-04-18 | End: 2025-04-18

## 2025-04-18 RX ORDER — LISINOPRIL 2.5 MG/1
2.5 TABLET ORAL DAILY
Qty: 30 TABLET | Refills: 2 | Status: SHIPPED | OUTPATIENT
Start: 2025-04-19 | End: 2025-04-25 | Stop reason: DRUGHIGH

## 2025-04-18 RX ORDER — PREDNISONE 10 MG/1
TABLET ORAL
Qty: 9 TABLET | Refills: 0 | Status: SHIPPED | OUTPATIENT
Start: 2025-04-19 | End: 2025-04-25 | Stop reason: ALTCHOICE

## 2025-04-18 RX ORDER — GUAIFENESIN/DEXTROMETHORPHAN 100-10MG/5
5 SYRUP ORAL EVERY 4 HOURS PRN
Qty: 120 ML | Refills: 0 | Status: SHIPPED | OUTPATIENT
Start: 2025-04-18 | End: 2025-04-28

## 2025-04-18 RX ORDER — ROSUVASTATIN CALCIUM 20 MG/1
20 TABLET, COATED ORAL NIGHTLY
Qty: 30 TABLET | Refills: 2 | Status: SHIPPED | OUTPATIENT
Start: 2025-04-18

## 2025-04-18 RX ADMIN — Medication 10 ML: at 08:05

## 2025-04-18 RX ADMIN — ALPRAZOLAM 0.5 MG: 0.5 TABLET ORAL at 14:36

## 2025-04-18 RX ADMIN — PREDNISONE 30 MG: 20 TABLET ORAL at 07:56

## 2025-04-18 RX ADMIN — ALPRAZOLAM 0.5 MG: 0.5 TABLET ORAL at 08:02

## 2025-04-18 RX ADMIN — ESCITALOPRAM OXALATE 20 MG: 10 TABLET ORAL at 07:57

## 2025-04-18 RX ADMIN — METOPROLOL SUCCINATE 25 MG: 25 TABLET, EXTENDED RELEASE ORAL at 07:57

## 2025-04-18 RX ADMIN — ARFORMOTEROL TARTRATE 15 MCG: 15 SOLUTION RESPIRATORY (INHALATION) at 08:12

## 2025-04-18 RX ADMIN — LISINOPRIL 2.5 MG: 5 TABLET ORAL at 07:57

## 2025-04-18 RX ADMIN — GUAIFENESIN 600 MG: 600 TABLET, EXTENDED RELEASE ORAL at 07:58

## 2025-04-18 RX ADMIN — CLOPIDOGREL BISULFATE 300 MG: 300 TABLET, FILM COATED ORAL at 08:04

## 2025-04-18 RX ADMIN — SODIUM CHLORIDE, PRESERVATIVE FREE 10 ML: 5 INJECTION INTRAVENOUS at 08:02

## 2025-04-18 RX ADMIN — LEVALBUTEROL HYDROCHLORIDE 1.25 MG: 1.25 SOLUTION RESPIRATORY (INHALATION) at 08:12

## 2025-04-18 RX ADMIN — BUDESONIDE 500 MCG: 0.5 INHALANT RESPIRATORY (INHALATION) at 08:12

## 2025-04-18 RX ADMIN — ASPIRIN 81 MG: 81 TABLET, COATED ORAL at 07:57

## 2025-04-18 ASSESSMENT — PAIN SCALES - GENERAL
PAINLEVEL_OUTOF10: 0

## 2025-04-18 NOTE — PROGRESS NOTES
Incentive Spirometry education and demonstration completed by Respiratory Therapy Yes      Response to education: Fair     Teaching Time: 5 minutes    Minimum Predicted Vital Capacity - 524 mL.  Patient's Actual Vital Capacity - 500 mL. Turning over to Nursing for routine follow-up No.    Comments: pt. Requires more education on IS  Pt. States she has been doing IS on her own    Electronically signed by FARIDEH PROCTOR RCP on 4/18/2025 at 5:18 AM

## 2025-04-18 NOTE — PROGRESS NOTES
Data- discharge order received, pt verbalized agreement to discharge, needs for Home Health Care with [facility] for PT/OT and/or new Durable Medical Equipment through [facility] oxygen, VANNA reviewed and signed by MD, to be completed by RN.    Action- AVS prepared, discharge instructions prepared and given to pt, medication information packet given r/t NEW or CHANGED prescriptions, pt verbalized understanding further self-review.   D/C instruction summary: Diet- regular, Activity- up as tolerated, follow up with Primary Care Physician Haim Malagon,  [phone] appointment in 1 week, immunizations reviewed and updated, medications prescriptions to be filled at pharmacy, paper prescription given to patient. Inpatient surgical procedural reviewed: [facility]. Contact information provided to above agencies used.    Response- Case Management/ reported faxing completed VANNA and AVS to needed HHC/DME services stated above.   Pt belongings gathered, IV removed, pt dressed. Disposition is home with HHC/DME as stated above, transported with partner, taken to lobby via w/c with RN, no complications.     1. WEIGHT: Admit Weight - Scale: 59 kg (130 lb) (04/10/25 1557)        Today  Weight - Scale: 53.8 kg (118 lb 11.2 oz) (04/18/25 0001)       2. O2 SAT.: SpO2: 91 % (04/18/25 1145)

## 2025-04-18 NOTE — PROGRESS NOTES
CLINICAL PHARMACY NOTE: MEDS TO BEDS    Total # of Prescriptions Filled: 7   The following medications were delivered to the patient:  Rosuvastatin calcium 20mg tabs  Prednisone 10mg tabs  Lisinopril 2.5mg tablet  Carla - tussin dm 10 - 100mg/5ml syrp  Metoprolol succinate er 25mg tb24  Mucus relief 600mg tb12  Aspirin low dose 81mg tbec    Additional Documentation: Francia ARTEGAA approved to deliver medications to patient room=signed  [facility] ER tech

## 2025-04-18 NOTE — CARE COORDINATION
Discharge Planning;  Discharge order noted, Nereida ( [phone]) with [facility] was updated of patient discharge. She stated a Respiratory Therapist from [facility] will deliver  the NIV to the patient's  room, do the education and the set up prior to patient leaving the hospital.

## 2025-04-18 NOTE — CARE COORDINATION
Case Management -  Discharge Note      Patient Name: Neena Mejia                   YOB: 1960  Room: [address]            Readmission Risk (Low < 19, Mod (19-27), High > 27): Readmission Risk Score: 17.6    Current PCP: Haim Malagon DO      (IMM) Important Message from Medicare:    Has pt received appropriate compliance notices before being discharged if required: yes  Compliance doc:  [x] 2nd IMM; [] Code 44 [] [place]  Date Given: 4/18/25 Given By: JOSÉ MIGUEL    PT AM-PAC: 20 /24  OT AM-PAC: 21 /24    Patient/patient representative has been educated on the benefits of HHC as well as the possible risks of declining recommended services. Patient/patient representative has acknowledged the information provided and decided on the following discharge plan. Patient/ patient representative has been provided freedom of choice regarding service provider, supported by basic dialogue that supports the patient's individualized plan of care/goals.    Home Care Information:   Is patient resuming current home health care services: No    Home Care Agency:   FACILITY:     [facility]  ADDRESS:   [address] [place] [address]   PHONE:        [phone]  FAX:              [phone]             Services: Skilled Nursing, PT & OT    Home Health Order Obtained: Yes    Home health agency notified of discharge.  Nurse Jo-Ann with Intake      [facility] agency notified of discharge:  [x] Yes [] No  [] NA    Family notified of discharge:  [x] Yes  [] No  [] NA    Facility notified of discharge:  [] Yes  [] No  [x] NA    Pt is being discharged with Outpt IV Antibiotics  [] Yes [x] No  [] NA  If yes, make sure VANNA is faxed to C agency, and meds are called in to pharmacy by RN from VANNA orders only.      Home oxygen and the NIV  ( Non-Invasive Ventilator  ) via  [facility]  [address] [place] [address]  Phone: [phone]  Fax: [phone]      Oxygen baseline- 3 lt/n/c       Financial    Payor: [employer] MEDICARE / Plan: HU[employer] GOLD PLUS HMO / Product Type: *No Product type* /     Pharmacy:  Potential assistance Purchasing Medications:    Meds-to-Beds request: Yes      [id number] [place], [place] - [address] [phone] - F [phone] [address] [address]  Phone: [phone] Fax: [phone]      Notes:    Additional Case Management Notes:   Family member will transport patient home.    Electronically signed by KIRTI CORONA RN/BSN/CCM on 4/18/2025 at 12:10 PM

## 2025-04-18 NOTE — PROGRESS NOTES
04/18/25 0800   RT Protocol   History Pulmonary Disease 2   Respiratory pattern 2   Breath sounds 4   Cough 1   Bronchodilator Assessment Score 9

## 2025-04-18 NOTE — CARE COORDINATION
04/18/25 1150   IMM Letter   IMM Letter given to Patient/Family/Significant other/Guardian/POA/by: Second IMM given to patient by CM   IMM Letter date given: 04/18/25   IMM Letter time given: 1140  (patient in agreement with not having the four hours notice)

## 2025-04-18 NOTE — PLAN OF CARE
Problem: Chronic Conditions and Co-morbidities  Goal: Patient's chronic conditions and co-morbidity symptoms are monitored and maintained or improved  4/18/2025 0806 by Francia Marcos RN  Outcome: Progressing     Problem: Discharge Planning  Goal: Discharge to home or other facility with appropriate resources  4/18/2025 0806 by Francia Marcos, RN  Outcome: Progressing     Problem: Safety - Adult  Goal: Free from fall injury  4/18/2025 0806 by Francia Marcos, RN  Outcome: Progressing     Problem: Skin/Tissue Integrity  Goal: Skin integrity remains intact  Description: 1.  Monitor for areas of redness and/or skin breakdown  2.  Assess vascular access sites hourly  3.  Every 4-6 hours minimum:  Change oxygen saturation probe site  4.  Every 4-6 hours:  If on nasal continuous positive airway pressure, respiratory therapy assess nares and determine need for appliance change or resting period  4/18/2025 0806 by Francia Marcos, RN  Outcome: Progressing

## 2025-04-18 NOTE — DISCHARGE SUMMARY
[facility] DISCHARGE SUMMARY    Patient Demographics    Patient. Neena Mejia  Date of Birth. 1960  MRN. [id number]     Primary care provider. Haim Malagon DO  (Tel: [phone])    Admit date: 4/10/2025    Discharge date (blank if same as Note Date): 4/18/2025  Note Date: 4/18/2025     Reason for Hospitalization.   Chief Complaint   Patient presents with    Shortness of Breath     Ems report: pt. On CPAP, on arrival pt. On chronic 3L NC at 80%, improved with CPAP, history of COPD, pt. Reports SOB since Monday.          Significant Findings.   Principal Problem:    COPD with acute exacerbation (HCC)  Active Problems:    SOB (shortness of breath)    Acute respiratory failure with hypoxia and hypercapnia    Wide-complex tachycardia    Systolic CHF, chronic (HCC)    Rhinovirus infection    Cardiomyopathy    Shortness of breath  Resolved Problems:    * No resolved hospital problems. *       Problems and results from this hospitalization that need follow up.  CAD / CHF. Cardiology follow up scheduled for 4/25  Advanced COPD. Pulmonary follow up    Significant test results and incidental findings.  CT CHEST PULMONARY EMBOLISM W CONTRAST   Final Result   No evidence of pulmonary embolism or acute pulmonary abnormality.       Severe pulmonary emphysematous changes are seen.           XR CHEST PORTABLE   Final Result   1. No acute cardiopulmonary process.   2. Emphysema and COPD.     Carotid doppler:  The right internal carotid artery appears to have a <50% diameter reducing stenosis based on velocity criteria.  The right vertebral artery demonstrates normal antegrade flow.  The right subclavian artery is visualized and demonstrates multiphasic flow.      The left internal carotid artery appears to have a <50% diameter reducing stenosis based on velocity criteria.  The left vertebral artery  plans, follow up and medications. Expresses understanding. Questions answered.            Consults.  IP CONSULT TO PULMONOLOGY  IP CONSULT TO CARDIOLOGY  IP CONSULT TO HOME CARE NEEDS  IP CONSULT TO CARDIOTHORACIC SURGERY    Physical examination on discharge day.   /82   Pulse 80   Temp 96.8 °F (36 °C) (Temporal)   Resp 18   Ht 1.6 m (5' 3\")   Wt 53.8 kg (118 lb 11.2 oz)   SpO2 91%   BMI 21.03 kg/m²   General appearance.  Alert. Looks comfortable.  HEENT. Sclera clear. Moist mucus membranes.  Cardiovascular. Regular rate and rhythm, normal S1, S2. No murmur.   Respiratory. Not using accessory muscles.Clear to auscultation bilaterally, no wheeze.  Gastrointestinal. Abdomen soft, non-tender, not distended, normal bowel sounds  Neurology. Facial symmetry. No speech deficits. Moving all extremities equally.  Extremities. No edema in lower extremities.  Skin. Warm, dry, normal turgor    Condition at time of discharge stable    Medication instructions provided to patient at discharge.     Medication List        START taking these medications      aspirin 81 MG EC tablet  Take 1 tablet by mouth daily  Start taking on: April 19, 2025  Notes to patient: Use: anti inflammatory used to help reduce the risk of heart attack  Side effects: increased bleeding & bruising, upset stomach & nausea     guaiFENesin 600 MG extended release tablet  Commonly known as: MUCINEX  Take 1 tablet by mouth 2 times daily  Notes to patient: Cough expectorant, decongestant  Side effects: nervousness, trouble sleeping     guaiFENesin-dextromethorphan 100-10 MG/5ML syrup  Commonly known as: ROBITUSSIN DM  Take 5 mLs by mouth every 4 hours as needed for Cough  Notes to patient: Use: reduce cough  Side effects: dizziness & nausea     lisinopril 2.5 MG tablet  Commonly known as: PRINIVIL;ZESTRIL  Take 1 tablet by mouth daily  Start taking on: April 19, 2025  Notes to patient: Use: to treat high blood pressure and chest pain  Side effects:

## 2025-04-18 NOTE — PROGRESS NOTES
[facility] Daily Progress Note      Admit Date:  4/10/2025    Chief Complaint   Patient presents with    Shortness of Breath     Ems report: pt. On CPAP, on arrival pt. On chronic 3L NC at 80%, improved with CPAP, history of COPD, pt. Reports SOB since Monday.         Subjective:  Ms. Mejia resting comfortably in bed. Remains on 3L O2. Denies chest pain / pressure. R radial site CDI.     Objective:   /82   Pulse 80   Temp 96.8 °F (36 °C) (Temporal)   Resp 18   Ht 1.6 m (5' 3\")   Wt 53.8 kg (118 lb 11.2 oz)   SpO2 91%   BMI 21.03 kg/m²     Intake/Output Summary (Last 24 hours) at 4/18/2025 1216  Last data filed at 4/18/2025 0818  Gross per 24 hour   Intake 20 ml   Output 400 ml   Net -380 ml       TELEMETRY: Sinus     Physical Exam:  General:  Awake, alert, oriented x 3, NAD  Skin:  Warm and dry  Neck:  JVD +  Chest:  rhonchi  Cardiovascular:  RRR S1S2, no S3, no mrmr  Abdomen:  Soft, ND, NT, No HSM  Extremities:  No edema    Medications:    [START ON 4/19/2025] predniSONE  20 mg Oral Daily    sodium chloride flush  5-40 mL IntraVENous 2 times per day    metoprolol succinate  25 mg Oral Daily    aspirin  81 mg Oral Daily    rosuvastatin  20 mg Oral Nightly    levalbuterol  1.25 mg Nebulization 4x Daily RT    guaiFENesin  600 mg Oral BID    lisinopril  2.5 mg Oral Daily    arformoterol tartrate  15 mcg Nebulization BID RT    budesonide  0.5 mg Nebulization BID RT    amitriptyline  25 mg Oral Nightly    escitalopram  20 mg Oral Daily    sodium chloride flush  5-40 mL IntraVENous 2 times per day    enoxaparin  40 mg SubCUTAneous Daily      sodium chloride      sodium chloride       sodium chloride flush, sodium chloride, acetaminophen, oxyCODONE-acetaminophen **OR** oxyCODONE-acetaminophen, ALPRAZolam, albuterol, sodium chloride flush, sodium chloride, ondansetron **OR** ondansetron, polyethylene glycol, [DISCONTINUED] acetaminophen **OR** acetaminophen, guaiFENesin-dextromethorphan,  benzonatate    Lab Data:  CBC:   Recent Labs     04/16/25  0510 04/17/25  0426 04/18/25  0915   WBC 10.1 11.4* 13.0*   HGB 13.4 13.1 13.8   HCT 40.2 37.9 40.5   MCV 87.8 87.0 87.2    285 283     BMP:   Recent Labs     04/16/25  0510 04/17/25  0426 04/18/25  0916   * 132* 133*   K 4.1 4.1 4.6   CL 95* 95* 94*   CO2 32 31 31   BUN 9 16 13   CREATININE 0.4* 0.4* 0.5*     LIVER PROFILE: No results for input(s): \"AST\", \"ALT\", \"LIPASE\", \"AMYLASE\", \"BILIDIR\", \"BILITOT\", \"ALKPHOS\" in the last 72 hours.    Invalid input(s): \"ALB\"  PT/INR: No results for input(s): \"PROTIME\", \"INR\" in the last 72 hours.  APTT: No results for input(s): \"APTT\" in the last 72 hours.  BNP:  No results for input(s): \"BNP\" in the last 72 hours.  IMAGING:     Echo 2/26/2025    Left Ventricle: Moderately reduced left ventricular systolic function with a visually estimated EF of 30 - 35%. Left ventricle size is normal. Normal wall thickness. There are regional wall motion abnormalities. Indeterminate diastolic function due to poor image quality.    Right Ventricle: Right ventricle size is normal. Normal systolic function.    Tricuspid Valve: Unable to assess RVSP.    Pericardium: Trivial localized pericardial effusion present around the right ventricle. No indication of cardiac tamponade.    IVC/SVC: IVC diameter is less than or equal to 21 mm and decreases greater than 50% during inspiration; therefore the estimated right atrial pressure is normal (~3 mmHg).    [facility] 4/16/2025  IMPRESSION/SUMMARY  ~Coronary Angiography w/ severe LM/LCX/LAD disease. Heavily calcified    Assessment/Plan:  Principal Problem:  Copd exacerbation  Baseline O2 1L  Currently on 2L      Acute systolic heart failure  new onset, combined systolic and diastolic  acc c nyha III  Ischemia cause due to LM disease  regional wall motion abnormalities on echo, wide complex tachycardia, Ef 30-35%  continue lisinopril / bb   Not on sglt2 or spironolactone due to blood

## 2025-04-18 NOTE — PLAN OF CARE
Problem: Chronic Conditions and Co-morbidities  Goal: Patient's chronic conditions and co-morbidity symptoms are monitored and maintained or improved  4/18/2025 1528 by Francia Marcos RN  Outcome: Completed  4/18/2025 0806 by Francia Marcos RN  Outcome: Progressing     Problem: Discharge Planning  Goal: Discharge to home or other facility with appropriate resources  4/18/2025 1528 by Francia Marcos RN  Outcome: Completed  4/18/2025 0806 by Francia Marcos RN  Outcome: Progressing     Problem: Safety - Adult  Goal: Free from fall injury  4/18/2025 1528 by Francia Marcos RN  Outcome: Completed  4/18/2025 0806 by Francia Marcos RN  Outcome: Progressing     Problem: Skin/Tissue Integrity  Goal: Skin integrity remains intact  Description: 1.  Monitor for areas of redness and/or skin breakdown  2.  Assess vascular access sites hourly  3.  Every 4-6 hours minimum:  Change oxygen saturation probe site  4.  Every 4-6 hours:  If on nasal continuous positive airway pressure, respiratory therapy assess nares and determine need for appliance change or resting period  4/18/2025 1528 by Francia Marcos RN  Outcome: Completed  4/18/2025 0806 by Francia Marcos RN  Outcome: Progressing     Problem: Pain  Goal: Verbalizes/displays adequate comfort level or baseline comfort level  4/18/2025 1528 by Francia Marcos RN  Outcome: Completed  4/18/2025 0806 by Francia Marcos RN  Outcome: Progressing     Problem: Nutrition Deficit:  Goal: Optimize nutritional status  4/18/2025 1528 by Francia Marcos RN  Outcome: Completed  4/18/2025 0806 by Francia Marcos RN  Outcome: Progressing

## 2025-04-18 NOTE — DISCHARGE INSTRUCTIONS
Guidelines for Heart Failure home management:    1. Continue to monitor weight first thing each morning. You should weigh yourself after using the bathroom and before you eat breakfast.    2. Report to your doctor any significant weight change. Remember that weight change of 2-3 lbs. in 1 day or 5 lbs in a week is \"significant\" and likely represents changes in \"fluid\" status.  If you are experiencing any swelling in your feet, ankles or abdomen, or shortness of breath, call your doctor.     3. You should restrict all sodium intake to 3000 milligrams (3 grams) a day. Depending on your status, you may also be asked to restrict fluid intake to no more that 64 oz/2 Liters a day. If uncertain, ask the nurse or physician.     4. Regular aerobic exercise is encouraged 30 minutes a day (walking, bike, swimming, etc.). For specific exercise recommendations, ask your physician.     5. Report to your doctor any change in symptoms (chest pain, worsening shortness of breath, increased dizziness or passing out, increased palpitations or ICD shock, trouble catching breath while lying down, increased edema or abdominal bloating). Remember that even \"minor\" changes in symptoms may be important. Also report any changes in medications including \"over the counter\" medications.     6. DO NOT take NSAID's for pain (i.e, Advil, Aleve, Motrin, ibuprofen, and many more) since these may cause serious problems in those with a history of CHF. If uncertain about the medication, call your doctor.    7. If you have new significant or ongoing diarrhea or vomiting, please call your doctor for further instructions. Taking a diuretic (water pill) with these symptoms can worsen dehydration.     8. If you have any questions or concerns you can always call the CHF  Resource Line( [phone].  It is available Monday thru Friday 8 am- 5 pm. Please leave a message and your call will be returned shortly.     Extra Heart Failure  sites:    https://[employer]/publication/?c=167986  --- this is American Heart Association interactive Healthier Living with Heart Failure guidebook. Please click hyperlink or copy / paste link into search bar. Use your mouse to scroll through the pages. Lots of information about weight monitoring, diet tips, activity, meds, etc    HF [place] eduardo -- this is a free smart phone eduardo available for iPhone and Android download. Use your phone to track sodium / fluid intake, zone tool symptom tracking, weights, medications, etc. Click on this hyperlink HF [place] Eduardo for QR code for easy download.    DASH (Dietary Approach to Stop Hypertension) diet -- https://www.nhlbi.nih.gov/education/dash-eating-plan -- this diet is a flexible eating plan that promotes heart healthy eating style. Click on hyperlink or copy / paste link into search bar. Lots of low sodium recipes and tips.    https://www.[employer]/recipes -- more free recipes        LEFT HEART CATHETERIZATION    Care of your puncture site:  Remove bandage 24 hours after the procedure.  May shower in 24 hours but do not sit in a bathtub/pool of water for 5 days or until the wound is healed.  Inspect the site daily and gently clean using soap and water while standing in the shower.  Dry thoroughly and apply a Band-Aid that covers the entire site. Do not apply powder or lotion.    Normal Observations:  Soreness or tenderness which may last one week.  Mild oozing from the incision site.  Possible bruising that could last 2 weeks.    Activity:  You may resume driving 24 hours following the procedure.  You may resume normal activity in 5 days or after the wound heals.  Avoid lifting more than 10 pounds for 5 days or until the wound heals.  Avoid strenuous exercise or activity for 1 week.    Nutrition:  Regular diet  Drink at least 8 to 10 glasses of decaffeinated, non-alcoholic fluid for the next 24 hours to flush the x-ray dye used for your angiogram out of

## 2025-04-18 NOTE — NURSE NAVIGATOR
[facility]  HEART FAILURE PROGRAM      Neena Mejia 1960    History:  Past Medical History:   Diagnosis Date    COPD (chronic obstructive pulmonary disease) ([facility])        ECHO:  2/26/25      Left Ventricle: Moderately reduced left ventricular systolic function with a visually estimated EF of 30 - 35%. Left ventricle size is normal. Normal wall thickness. There are regional wall motion abnormalities. Indeterminate diastolic function due to poor image quality.    Right Ventricle: Right ventricle size is normal. Normal systolic function.    Tricuspid Valve: Unable to assess RVSP.    Pericardium: Trivial localized pericardial effusion present around the right ventricle. No indication of cardiac tamponade.    IVC/SVC: IVC diameter is less than or equal to 21 mm and decreases greater than 50% during inspiration; therefore the estimated right atrial pressure is normal (~3 mmHg).       ACE/ARB/ARNi: lisinopril 2.5 mg daily--consider ARNi if not contraindicated  BB: toprol xl 25 mg daily  Aldosterone Antagonist: Patient not on- consider if not contraindicated     SGLT2: Patient not on- consider if not contraindicated       History of sleep apnea: no   Equipment used at home: discharging with NIV      DM History: No    Last Hospital Admission:3/5/25 with copd exacerbation  Code Status: full   Discharge plans: home with [facility]    Family Present: yes, significant other    Neena Mejia was admitted to the hospital with increased shortness of breath. Patient with new HFrEF. Patient does have a scale at home. She notes some abdomen fullness/bloating and dyspnea. Patient was not following any type of sodium or fluid restriction. She is compliant with medications and follow up visits.    Patient provided with both written and verbal education on CHF signs/ symptoms, causes, discharge medications, daily weights, low sodium diet, activity, and follow-up.  Pt to call if gains 3 pounds in one day or 5 pounds in one

## 2025-04-21 ENCOUNTER — TELEPHONE (OUTPATIENT)
Dept: OTHER | Age: 65
End: 2025-04-21

## 2025-04-21 PROCEDURE — 94375 RESPIRATORY FLOW VOLUME LOOP: CPT | Performed by: INTERNAL MEDICINE

## 2025-04-21 NOTE — PROCEDURES
Pulmonary Function Testing      Patient name:  Neena Mejia      Unit #:   [id number]   Date of test:  4/10/2025   Date of interpretation:   4/21/2025    Ms. Neena Mejia is a 64 y.o. year-old female. The spirometry data were acceptable and reproducible.     Spirometry:  Flow volume loops were obstructed. The FEV-1/FVC ratio was decreased. The pre-bronchodilator FEV-1 was 0.29 liters (-4.89 Z score), which was moderately decreased. The FVC was 1.32 liters (-3.04 Z score), which was decreased. Response to inhaled bronchodilators (albuterol) was not performed.        Interpretation:  Moderate obstructive ventilatory defect    Comments:  Z score  Mild -1.645 to -2.5  Moderate -2.5 to -4.0  Severe: < -4.0     Using Z-scores can reduce age and height bias, and the recommended thresholds correlate with mortality risk.    Paulino Longo MD

## 2025-04-21 NOTE — TELEPHONE ENCOUNTER
[facility]  HEART FAILURE PROGRAM  TELEPHONE ENCOUNTER FORM    Neena JONES Mejia 1960    Attempted to call patient for HF follow-up x 3. No answer at this time.      Next MD/ Clinic appointment: 4/25 with Thais HAGER, JENNI 4/21/2025 12:54 PM

## 2025-04-25 ENCOUNTER — OFFICE VISIT (OUTPATIENT)
Dept: CARDIOLOGY CLINIC | Age: 65
End: 2025-04-25

## 2025-04-25 VITALS
OXYGEN SATURATION: 99 % | SYSTOLIC BLOOD PRESSURE: 94 MMHG | WEIGHT: 119 LBS | DIASTOLIC BLOOD PRESSURE: 50 MMHG | BODY MASS INDEX: 21.09 KG/M2 | HEIGHT: 63 IN | HEART RATE: 73 BPM

## 2025-04-25 DIAGNOSIS — I25.10 CORONARY ARTERY DISEASE INVOLVING NATIVE CORONARY ARTERY OF NATIVE HEART WITHOUT ANGINA PECTORIS: Primary | ICD-10-CM

## 2025-04-25 DIAGNOSIS — I25.5 ISCHEMIC CARDIOMYOPATHY: ICD-10-CM

## 2025-04-25 DIAGNOSIS — I50.22 SYSTOLIC CHF, CHRONIC (HCC): ICD-10-CM

## 2025-04-25 DIAGNOSIS — I10 PRIMARY HYPERTENSION: ICD-10-CM

## 2025-04-25 RX ORDER — LISINOPRIL 2.5 MG/1
1.25 TABLET ORAL DAILY
Qty: 15 TABLET | Refills: 2 | Status: SHIPPED
Start: 2025-04-25

## 2025-04-25 NOTE — PATIENT INSTRUCTIONS
Decrease lisinopril to 1/2 tablet daily    Start checking your BP with your weights    Appt in 3-4 weeks with Dr. Arizmendi

## 2025-04-28 ENCOUNTER — HOSPITAL ENCOUNTER (OUTPATIENT)
Age: 65
Discharge: HOME | End: 2025-04-28
Payer: MEDICARE

## 2025-04-28 DIAGNOSIS — I50.22 SYSTOLIC CHF, CHRONIC (HCC): ICD-10-CM

## 2025-04-28 LAB
ALBUMIN SERPL-MCNC: 4.1 G/DL (ref 3.4–5)
ALBUMIN/GLOB SERPL: 1.6 {RATIO} (ref 1.1–2.2)
ALP SERPL-CCNC: 54 U/L (ref 40–129)
ALT SERPL-CCNC: 17 U/L (ref 10–40)
ANION GAP SERPL CALCULATED.3IONS-SCNC: 8 MMOL/L (ref 3–16)
AST SERPL-CCNC: 18 U/L (ref 15–37)
BILIRUB SERPL-MCNC: <0.2 MG/DL (ref 0–1)
BUN SERPL-MCNC: 8 MG/DL (ref 7–20)
CALCIUM SERPL-MCNC: 9.8 MG/DL (ref 8.3–10.6)
CHLORIDE SERPL-SCNC: 92 MMOL/L (ref 99–110)
CO2 SERPL-SCNC: 35 MMOL/L (ref 21–32)
CREAT SERPL-MCNC: 0.4 MG/DL (ref 0.6–1.2)
GFR SERPLBLD CREATININE-BSD FMLA CKD-EPI: >90 ML/MIN/{1.73_M2}
GLUCOSE SERPL-MCNC: 73 MG/DL (ref 70–99)
MAGNESIUM SERPL-MCNC: 1.94 MG/DL (ref 1.8–2.4)
POTASSIUM SERPL-SCNC: 5.6 MMOL/L (ref 3.5–5.1)
PROT SERPL-MCNC: 6.7 G/DL (ref 6.4–8.2)
SODIUM SERPL-SCNC: 135 MMOL/L (ref 136–145)

## 2025-04-28 PROCEDURE — 36415 COLL VENOUS BLD VENIPUNCTURE: CPT

## 2025-04-28 PROCEDURE — 80053 COMPREHEN METABOLIC PANEL: CPT

## 2025-04-28 PROCEDURE — 83735 ASSAY OF MAGNESIUM: CPT

## 2025-04-28 NOTE — PROGRESS NOTES
infiltration or pneumothorax.The heart and mediastinal   structures  appear normal.Bony structures unremarkable. (X-ray chest on 4/10)  -in Epic  ProBNP-54(4/10)  -T Lisinopril, Rosuvastatin (medication list)    Thank you,  Jack SOLIS CDS  Options provided:  -- Acute on chronic  combined systolic and diastolic CHF currently as   evidenced by, Please document evidence.  -- Acute combined systolic and diastolic CHF ruled out after study and Chronic   systolic CHF confirmed  -- Other - I will add my own diagnosis  -- Disagree - Not applicable / Not valid  -- Disagree - Clinically unable to determine / Unknown  -- Refer to Clinical Documentation Reviewer    PROVIDER RESPONSE TEXT:    Acute combined systolic and diastolic CHF ruled out after study and Chronic   systolic CHF confirmed    Query created by: Jack Krueger on 4/23/2025 7:12 AM      Electronically signed by:  TRA GARCIA - CNP 4/28/2025 2:41 PM

## 2025-05-06 ENCOUNTER — RESULTS FOLLOW-UP (OUTPATIENT)
Dept: CARDIOLOGY CLINIC | Age: 65
End: 2025-05-06

## 2025-05-06 NOTE — TELEPHONE ENCOUNTER
----- Message from JOSE Atkinson CNP sent at 5/6/2025  3:07 PM EDT -----  Hold lisinopril ; avoid food rich in potassium and recheck level in 4 days

## 2025-06-16 ENCOUNTER — TELEPHONE (OUTPATIENT)
Dept: PULMONOLOGY | Age: 65
End: 2025-06-16

## 2025-06-16 NOTE — TELEPHONE ENCOUNTER
Tried to LM for pt to r/s her appt today that she missed. This was a HFU appt. Could not LM due to VM being full.

## (undated) DEVICE — ENDOSCOPIC KIT 6X3/16 FT COLON W/ 1.1 OZ 2 GWN W/O BRSH

## (undated) DEVICE — DRAPE,ANGIO,BRACH,STERILE,38X44: Brand: MEDLINE

## (undated) DEVICE — Device: Brand: NOMOLINE™ LH ADULT NASAL CO2 CANNULA WITH O2 4M

## (undated) DEVICE — SINGLE USE BIOPSY VALVE MAJ-210: Brand: SINGLE USE BIOPSY VALVE (STERILE)

## (undated) DEVICE — CATHETER DIAG L100CM DIA5.2FR 0.038IN POLYUR COR JR4 STD

## (undated) DEVICE — CATH LAB PACK: Brand: MEDLINE INDUSTRIES, INC.

## (undated) DEVICE — Z DISCONTINUED USE 2132653 SYRINGE MED 10ML POLYPR LUERSLIP TIP FLAT TOP W/O SFTY DISP

## (undated) DEVICE — TR BAND RADIAL ARTERY COMPRESSION DEVICE: Brand: TR BAND

## (undated) DEVICE — KIT AT-X65 ANGIOTOUCH HAND CONTROLLER

## (undated) DEVICE — CONMED CHANNEL MASTER PULMONARY AND PEDIATRIC CLEANING BRUSH, 160 CM X 2.0 MM: Brand: CONMED

## (undated) DEVICE — SYRINGE MED 50ML LUERLOCK TIP

## (undated) DEVICE — GUIDEWIRE VASC L260CM DIA0.035IN RAD 3MM J TIP L7CM PTFE

## (undated) DEVICE — CATHETER ANGIO 5FR L100CM GRY S STL NYL JL3.5 3 SEG BRAID L

## (undated) DEVICE — PAD, DEFIB, ADULT, RADIOTRANS, PHYSIO: Brand: MEDLINE

## (undated) DEVICE — GLIDESHEATH SLENDER ACCESS KIT: Brand: GLIDESHEATH SLENDER

## (undated) DEVICE — GOWN AURORA NONREINF LG: Brand: MEDLINE INDUSTRIES, INC.

## (undated) DEVICE — SINGLE USE SUCTION VALVE MAJ-209: Brand: SINGLE USE SUCTION VALVE (STERILE)

## (undated) DEVICE — SPECIMEN TRAP: Brand: ARGYLE